# Patient Record
Sex: MALE | Race: WHITE | NOT HISPANIC OR LATINO | Employment: OTHER | URBAN - METROPOLITAN AREA
[De-identification: names, ages, dates, MRNs, and addresses within clinical notes are randomized per-mention and may not be internally consistent; named-entity substitution may affect disease eponyms.]

---

## 2019-04-03 ENCOUNTER — HOSPITAL ENCOUNTER (EMERGENCY)
Facility: HOSPITAL | Age: 49
Discharge: HOME | End: 2019-04-03
Attending: INTERNAL MEDICINE

## 2019-04-03 VITALS
BODY MASS INDEX: 48.18 KG/M2 | WEIGHT: 307 LBS | RESPIRATION RATE: 20 BRPM | OXYGEN SATURATION: 97 % | SYSTOLIC BLOOD PRESSURE: 134 MMHG | DIASTOLIC BLOOD PRESSURE: 99 MMHG | HEART RATE: 88 BPM | HEIGHT: 67 IN

## 2019-04-03 DIAGNOSIS — K57.92 ACUTE DIVERTICULITIS OF INTESTINE: Primary | ICD-10-CM

## 2019-04-03 LAB
ALBUMIN SERPL BCP-MCNC: 3.8 G/DL (ref 3.5–5.2)
ALP SERPL-CCNC: 85 U/L (ref 55–135)
ALT SERPL W/O P-5'-P-CCNC: 29 U/L (ref 10–44)
ANION GAP SERPL CALC-SCNC: 7 MMOL/L (ref 8–16)
AST SERPL-CCNC: 27 U/L (ref 10–40)
BASOPHILS # BLD AUTO: 0.05 K/UL (ref 0–0.2)
BASOPHILS NFR BLD: 0.5 % (ref 0–1.9)
BILIRUB SERPL-MCNC: 0.8 MG/DL (ref 0.1–1)
BUN SERPL-MCNC: 22 MG/DL (ref 6–20)
CALCIUM SERPL-MCNC: 9.3 MG/DL (ref 8.7–10.5)
CHLORIDE SERPL-SCNC: 99 MMOL/L (ref 95–110)
CO2 SERPL-SCNC: 27 MMOL/L (ref 23–29)
CREAT SERPL-MCNC: 1.4 MG/DL (ref 0.5–1.4)
CRP SERPL-MCNC: 4.29 MG/DL (ref 0–0.75)
DIFFERENTIAL METHOD: ABNORMAL
EOSINOPHIL # BLD AUTO: 0.1 K/UL (ref 0–0.5)
EOSINOPHIL NFR BLD: 0.9 % (ref 0–8)
ERYTHROCYTE [DISTWIDTH] IN BLOOD BY AUTOMATED COUNT: 11.9 % (ref 11.5–14.5)
EST. GFR  (AFRICAN AMERICAN): >60 ML/MIN/1.73 M^2
EST. GFR  (NON AFRICAN AMERICAN): 57.8 ML/MIN/1.73 M^2
GLUCOSE SERPL-MCNC: 115 MG/DL (ref 70–110)
HCT VFR BLD AUTO: 45.6 % (ref 40–54)
HGB BLD-MCNC: 14.9 G/DL (ref 14–18)
IMM GRANULOCYTES # BLD AUTO: 0.02 K/UL (ref 0–0.04)
IMM GRANULOCYTES NFR BLD AUTO: 0.2 % (ref 0–0.5)
LYMPHOCYTES # BLD AUTO: 2.5 K/UL (ref 1–4.8)
LYMPHOCYTES NFR BLD: 27.1 % (ref 18–48)
MCH RBC QN AUTO: 30.3 PG (ref 27–31)
MCHC RBC AUTO-ENTMCNC: 32.7 G/DL (ref 32–36)
MCV RBC AUTO: 93 FL (ref 82–98)
MONOCYTES # BLD AUTO: 1.1 K/UL (ref 0.3–1)
MONOCYTES NFR BLD: 12 % (ref 4–15)
NEUTROPHILS # BLD AUTO: 5.5 K/UL (ref 1.8–7.7)
NEUTROPHILS NFR BLD: 59.3 % (ref 38–73)
NRBC BLD-RTO: 0 /100 WBC
PLATELET # BLD AUTO: 234 K/UL (ref 150–350)
PMV BLD AUTO: 9.4 FL (ref 9.2–12.9)
POTASSIUM SERPL-SCNC: 4 MMOL/L (ref 3.5–5.1)
PROT SERPL-MCNC: 8.1 G/DL (ref 6–8.4)
RBC # BLD AUTO: 4.92 M/UL (ref 4.6–6.2)
SODIUM SERPL-SCNC: 133 MMOL/L (ref 136–145)
WBC # BLD AUTO: 9.23 K/UL (ref 3.9–12.7)

## 2019-04-03 PROCEDURE — 80053 COMPREHEN METABOLIC PANEL: CPT

## 2019-04-03 PROCEDURE — 74176 CT ABD & PELVIS W/O CONTRAST: CPT | Mod: 26,,, | Performed by: RADIOLOGY

## 2019-04-03 PROCEDURE — 74176 CT ABD & PELVIS W/O CONTRAST: CPT | Mod: TC

## 2019-04-03 PROCEDURE — 99284 EMERGENCY DEPT VISIT MOD MDM: CPT | Mod: 25

## 2019-04-03 PROCEDURE — 25000003 PHARM REV CODE 250: Performed by: INTERNAL MEDICINE

## 2019-04-03 PROCEDURE — 86140 C-REACTIVE PROTEIN: CPT

## 2019-04-03 PROCEDURE — 74176 CT RENAL STONE STUDY ABD PELVIS WO: ICD-10-PCS | Mod: 26,,, | Performed by: RADIOLOGY

## 2019-04-03 PROCEDURE — 63600175 PHARM REV CODE 636 W HCPCS: Performed by: INTERNAL MEDICINE

## 2019-04-03 PROCEDURE — 96375 TX/PRO/DX INJ NEW DRUG ADDON: CPT

## 2019-04-03 PROCEDURE — 96365 THER/PROPH/DIAG IV INF INIT: CPT

## 2019-04-03 PROCEDURE — S0077 INJECTION, CLINDAMYCIN PHOSP: HCPCS | Performed by: INTERNAL MEDICINE

## 2019-04-03 PROCEDURE — 85025 COMPLETE CBC W/AUTO DIFF WBC: CPT

## 2019-04-03 RX ORDER — ALLOPURINOL 100 MG/1
100 TABLET ORAL DAILY
COMMUNITY
End: 2021-06-10 | Stop reason: SDUPTHER

## 2019-04-03 RX ORDER — SULFAMETHOXAZOLE AND TRIMETHOPRIM 400; 80 MG/1; MG/1
1 TABLET ORAL
Status: DISCONTINUED | OUTPATIENT
Start: 2019-04-03 | End: 2019-04-03

## 2019-04-03 RX ORDER — CLINDAMYCIN HYDROCHLORIDE 150 MG/1
300 CAPSULE ORAL 4 TIMES DAILY
Qty: 56 CAPSULE | Refills: 0 | Status: SHIPPED | OUTPATIENT
Start: 2019-04-03 | End: 2019-04-10

## 2019-04-03 RX ORDER — SULFAMETHOXAZOLE AND TRIMETHOPRIM 800; 160 MG/1; MG/1
1 TABLET ORAL
Status: COMPLETED | OUTPATIENT
Start: 2019-04-03 | End: 2019-04-03

## 2019-04-03 RX ORDER — PANTOPRAZOLE SODIUM 20 MG/1
20 TABLET, DELAYED RELEASE ORAL DAILY
Status: ON HOLD | COMMUNITY
End: 2021-05-27 | Stop reason: HOSPADM

## 2019-04-03 RX ORDER — CLINDAMYCIN PHOSPHATE 900 MG/50ML
900 INJECTION, SOLUTION INTRAVENOUS
Status: COMPLETED | OUTPATIENT
Start: 2019-04-03 | End: 2019-04-03

## 2019-04-03 RX ORDER — DEXAMETHASONE SODIUM PHOSPHATE 100 MG/10ML
10 INJECTION INTRAMUSCULAR; INTRAVENOUS
Status: COMPLETED | OUTPATIENT
Start: 2019-04-03 | End: 2019-04-03

## 2019-04-03 RX ORDER — KETOROLAC TROMETHAMINE 30 MG/ML
30 INJECTION, SOLUTION INTRAMUSCULAR; INTRAVENOUS
Status: COMPLETED | OUTPATIENT
Start: 2019-04-03 | End: 2019-04-03

## 2019-04-03 RX ORDER — DEXAMETHASONE SODIUM PHOSPHATE 100 MG/10ML
4 INJECTION INTRAMUSCULAR; INTRAVENOUS
Status: DISCONTINUED | OUTPATIENT
Start: 2019-04-03 | End: 2019-04-03

## 2019-04-03 RX ORDER — CLONIDINE HYDROCHLORIDE 0.2 MG/1
0.2 TABLET ORAL ONCE
COMMUNITY
End: 2021-06-10 | Stop reason: SDUPTHER

## 2019-04-03 RX ORDER — SULFAMETHOXAZOLE AND TRIMETHOPRIM 800; 160 MG/1; MG/1
1 TABLET ORAL 2 TIMES DAILY
Qty: 14 TABLET | Refills: 0 | Status: SHIPPED | OUTPATIENT
Start: 2019-04-03 | End: 2019-04-10

## 2019-04-03 RX ADMIN — KETOROLAC TROMETHAMINE 30 MG: 30 INJECTION, SOLUTION INTRAMUSCULAR; INTRAVENOUS at 09:04

## 2019-04-03 RX ADMIN — CLINDAMYCIN IN 5 PERCENT DEXTROSE 900 MG: 18 INJECTION, SOLUTION INTRAVENOUS at 08:04

## 2019-04-03 RX ADMIN — DEXAMETHASONE SODIUM PHOSPHATE 10 MG: 10 INJECTION INTRAMUSCULAR; INTRAVENOUS at 09:04

## 2019-04-03 RX ADMIN — SULFAMETHOXAZOLE AND TRIMETHOPRIM 1 TABLET: 800; 160 TABLET ORAL at 08:04

## 2019-04-04 NOTE — ED PROVIDER NOTES
Encounter Date: 4/3/2019       History     Chief Complaint   Patient presents with    Abdominal Pain     symptoms since Saturday, no B/M     Patient presents with acute left lower quadrant pain of about 3 days duration.  He has had some mild nausea for about 2 weeks but not with focal pain. The pain is 8/10 and is referred.  He notes that when he goes over a bump in the car at causes significant pain. He has had no fever no chills he has been constipated has not had a bowel movement in 5 days.  He has tried over-the-counter laxatives with no success.  He has no history of constipation in the recent past is not taking any pain medication or other contributory causes for constipation.  He has had no history of diverticulitis.  No diarrhea.        Review of patient's allergies indicates:   Allergen Reactions    Codeine     Pcn [penicillins]      Past Medical History:   Diagnosis Date    Gout     Hypertension      History reviewed. No pertinent surgical history.  History reviewed. No pertinent family history.  Social History     Tobacco Use    Smoking status: Never Smoker   Substance Use Topics    Alcohol use: Not Currently    Drug use: Not on file     Review of Systems   Constitutional: Negative for fever.   HENT: Negative for sore throat.    Respiratory: Negative for shortness of breath.    Cardiovascular: Negative for chest pain.   Gastrointestinal: Positive for abdominal distention, abdominal pain and nausea.   Genitourinary: Negative for dysuria.   Musculoskeletal: Negative for back pain.   Skin: Negative for rash.   Neurological: Negative for weakness.   Hematological: Does not bruise/bleed easily.   All other systems reviewed and are negative.      Physical Exam     Initial Vitals [04/03/19 1912]   BP Pulse Resp Temp SpO2   (!) 156/109 88 18 -- 98 %      MAP       --         Physical Exam    Constitutional: Vital signs are normal. He appears well-developed and well-nourished. He is active and cooperative.    HENT:   Head: Normocephalic and atraumatic.   Mouth/Throat: Oropharynx is clear and moist.   Eyes: Conjunctivae, EOM and lids are normal. Lids are everted and swept, no foreign bodies found.   Neck: Trachea normal, normal range of motion and full passive range of motion without pain. Neck supple.   Cardiovascular: Normal rate, regular rhythm, S1 normal, S2 normal, normal heart sounds, intact distal pulses and normal pulses.  No extrasystoles are present.    Abdominal: Soft. Normal appearance. Bowel sounds are decreased. There is tenderness in the left lower quadrant. There is rebound and guarding.   Musculoskeletal: Normal range of motion.   Neurological: He is alert. He has normal reflexes. GCS eye subscore is 4. GCS verbal subscore is 5. GCS motor subscore is 6.   Skin: Skin is warm, dry and intact. Capillary refill takes less than 2 seconds.   Psychiatric: He has a normal mood and affect. His speech is normal and behavior is normal. Cognition and memory are normal.         ED Course   Procedures  Labs Reviewed   CBC W/ AUTO DIFFERENTIAL - Abnormal; Notable for the following components:       Result Value    Mono # 1.1 (*)     All other components within normal limits   COMPREHENSIVE METABOLIC PANEL - Abnormal; Notable for the following components:    Sodium 133 (*)     Glucose 115 (*)     BUN, Bld 22 (*)     Anion Gap 7 (*)     eGFR if non  57.8 (*)     All other components within normal limits   C-REACTIVE PROTEIN - Abnormal; Notable for the following components:    CRP 4.29 (*)     All other components within normal limits          Imaging Results          CT Renal Stone Study ABD Pelvis WO (In process)               X-Rays:   Independently Interpreted Readings:   Abdomen: No masses. Liver: Normal. Gallbladder: Normal. Stomach: Normal. Pancreas: Normal.Large Bowel: Diverticulosis present.  Inflammatory changes present. Small Bowel: Normal. Vessels: Normal. Bladder: Normal.     Medical Decision  Making:   Clinical Tests:   Lab Tests: Ordered and Reviewed  The following lab test(s) were unremarkable: CBC, CMP and Lipase       <> Summary of Lab: Lab studies are unremarkable.  He does have an elevated CRP of 4.6 hr CBC is normal and his amylase lipase and liver function studies are normal.  Radiological Study: Ordered and Reviewed  ED Management:  CT scan reveals evidence of left lower quadrant inflammation and possible diverticulitis.  No abscess is noted. No free air is noted. Patient was given intravenous antibiotics and will be continued on oral antibiotics.  He is to take MiraLax every 2 hr until he as evacuation.  It is felt that the significant constipation is contributing to the pressure in the area of inflammation.  He was noted to return if he has any such high fever sudden severe worsening of his pain or any other significant catastrophic changes.  His follow-up with local doctor in 2 days.                      Clinical Impression:       ICD-10-CM ICD-9-CM   1. Acute diverticulitis of intestine K57.92 562.11         Disposition:   Disposition: Discharged  Condition: Stable                        Erickson Duran MD  04/04/19 0220

## 2019-04-04 NOTE — ED NOTES
Pt here with c/o lower abd pain and constipation x4 days. Pt reports he typically has BM daily. Denies N/V. States he is able to eat some. No acute distress noted. Pt AAO.

## 2020-08-24 ENCOUNTER — HOSPITAL ENCOUNTER (EMERGENCY)
Facility: HOSPITAL | Age: 50
Discharge: HOME | End: 2020-08-24
Attending: EMERGENCY MEDICINE
Payer: MEDICAID

## 2020-08-24 VITALS
HEIGHT: 71 IN | RESPIRATION RATE: 15 BRPM | OXYGEN SATURATION: 97 % | TEMPERATURE: 99 F | HEART RATE: 77 BPM | SYSTOLIC BLOOD PRESSURE: 140 MMHG | DIASTOLIC BLOOD PRESSURE: 90 MMHG | BODY MASS INDEX: 43.4 KG/M2 | WEIGHT: 310 LBS

## 2020-08-24 DIAGNOSIS — R07.9 CHEST PAIN: Primary | ICD-10-CM

## 2020-08-24 DIAGNOSIS — K21.9 GASTROESOPHAGEAL REFLUX DISEASE, ESOPHAGITIS PRESENCE NOT SPECIFIED: ICD-10-CM

## 2020-08-24 LAB
ALBUMIN SERPL BCP-MCNC: 3.8 G/DL (ref 3.5–5.2)
ALP SERPL-CCNC: 96 U/L (ref 55–135)
ALT SERPL W/O P-5'-P-CCNC: 39 U/L (ref 10–44)
ANION GAP SERPL CALC-SCNC: 8 MMOL/L (ref 8–16)
AST SERPL-CCNC: 30 U/L (ref 10–40)
BASOPHILS # BLD AUTO: 0.09 K/UL (ref 0–0.2)
BASOPHILS NFR BLD: 1.3 % (ref 0–1.9)
BILIRUB SERPL-MCNC: 0.5 MG/DL (ref 0.1–1)
BNP SERPL-MCNC: 52 PG/ML (ref 0–99)
BUN SERPL-MCNC: 16 MG/DL (ref 6–20)
CALCIUM SERPL-MCNC: 9.2 MG/DL (ref 8.7–10.5)
CHLORIDE SERPL-SCNC: 104 MMOL/L (ref 95–110)
CO2 SERPL-SCNC: 24 MMOL/L (ref 23–29)
CREAT SERPL-MCNC: 0.7 MG/DL (ref 0.5–1.4)
DIFFERENTIAL METHOD: NORMAL
EOSINOPHIL # BLD AUTO: 0.1 K/UL (ref 0–0.5)
EOSINOPHIL NFR BLD: 1.4 % (ref 0–8)
ERYTHROCYTE [DISTWIDTH] IN BLOOD BY AUTOMATED COUNT: 11.9 % (ref 11.5–14.5)
EST. GFR  (AFRICAN AMERICAN): >60 ML/MIN/1.73 M^2
EST. GFR  (NON AFRICAN AMERICAN): >60 ML/MIN/1.73 M^2
GLUCOSE SERPL-MCNC: 146 MG/DL (ref 70–110)
HCT VFR BLD AUTO: 43 % (ref 40–54)
HGB BLD-MCNC: 14.5 G/DL (ref 14–18)
IMM GRANULOCYTES # BLD AUTO: 0.02 K/UL (ref 0–0.04)
IMM GRANULOCYTES NFR BLD AUTO: 0.3 % (ref 0–0.5)
LIPASE SERPL-CCNC: 28 U/L (ref 4–60)
LYMPHOCYTES # BLD AUTO: 1.7 K/UL (ref 1–4.8)
LYMPHOCYTES NFR BLD: 25.1 % (ref 18–48)
MCH RBC QN AUTO: 31 PG (ref 27–31)
MCHC RBC AUTO-ENTMCNC: 33.7 G/DL (ref 32–36)
MCV RBC AUTO: 92 FL (ref 82–98)
MONOCYTES # BLD AUTO: 0.6 K/UL (ref 0.3–1)
MONOCYTES NFR BLD: 8.7 % (ref 4–15)
NEUTROPHILS # BLD AUTO: 4.4 K/UL (ref 1.8–7.7)
NEUTROPHILS NFR BLD: 63.2 % (ref 38–73)
NRBC BLD-RTO: 0 /100 WBC
PLATELET # BLD AUTO: 178 K/UL (ref 150–350)
PMV BLD AUTO: 9.6 FL (ref 9.2–12.9)
POTASSIUM SERPL-SCNC: 4.1 MMOL/L (ref 3.5–5.1)
PROT SERPL-MCNC: 7.4 G/DL (ref 6–8.4)
RBC # BLD AUTO: 4.68 M/UL (ref 4.6–6.2)
SODIUM SERPL-SCNC: 136 MMOL/L (ref 136–145)
TROPONIN I SERPL DL<=0.01 NG/ML-MCNC: 0.01 NG/ML (ref 0.02–0.5)
TROPONIN I SERPL DL<=0.01 NG/ML-MCNC: <0.01 NG/ML (ref 0.02–0.5)
WBC # BLD AUTO: 6.9 K/UL (ref 3.9–12.7)

## 2020-08-24 PROCEDURE — 25000242 PHARM REV CODE 250 ALT 637 W/ HCPCS: Performed by: EMERGENCY MEDICINE

## 2020-08-24 PROCEDURE — 99285 EMERGENCY DEPT VISIT HI MDM: CPT | Mod: 25

## 2020-08-24 PROCEDURE — 25000003 PHARM REV CODE 250: Performed by: EMERGENCY MEDICINE

## 2020-08-24 PROCEDURE — 93005 ELECTROCARDIOGRAM TRACING: CPT

## 2020-08-24 PROCEDURE — 71045 X-RAY EXAM CHEST 1 VIEW: CPT | Mod: TC,FY

## 2020-08-24 PROCEDURE — 84484 ASSAY OF TROPONIN QUANT: CPT | Mod: 91

## 2020-08-24 PROCEDURE — 80053 COMPREHEN METABOLIC PANEL: CPT

## 2020-08-24 PROCEDURE — 83690 ASSAY OF LIPASE: CPT

## 2020-08-24 PROCEDURE — 71045 X-RAY EXAM CHEST 1 VIEW: CPT | Mod: 26,,, | Performed by: RADIOLOGY

## 2020-08-24 PROCEDURE — 36415 COLL VENOUS BLD VENIPUNCTURE: CPT

## 2020-08-24 PROCEDURE — 85025 COMPLETE CBC W/AUTO DIFF WBC: CPT

## 2020-08-24 PROCEDURE — 83880 ASSAY OF NATRIURETIC PEPTIDE: CPT

## 2020-08-24 PROCEDURE — 71045 XR CHEST AP PORTABLE: ICD-10-PCS | Mod: 26,,, | Performed by: RADIOLOGY

## 2020-08-24 RX ORDER — HYDROCODONE BITARTRATE AND ACETAMINOPHEN 5; 325 MG/1; MG/1
1 TABLET ORAL EVERY 4 HOURS PRN
Qty: 1 TABLET | Refills: 0 | Status: SHIPPED | OUTPATIENT
Start: 2020-08-24 | End: 2020-08-24 | Stop reason: CLARIF

## 2020-08-24 RX ORDER — NITROGLYCERIN 0.4 MG/1
0.4 TABLET SUBLINGUAL
Status: COMPLETED | OUTPATIENT
Start: 2020-08-24 | End: 2020-08-24

## 2020-08-24 RX ADMIN — LIDOCAINE HYDROCHLORIDE: 20 SOLUTION ORAL; TOPICAL at 02:08

## 2020-08-24 RX ADMIN — NITROGLYCERIN 0.4 MG: 0.4 TABLET SUBLINGUAL at 02:08

## 2021-05-26 ENCOUNTER — HOSPITAL ENCOUNTER (OUTPATIENT)
Facility: HOSPITAL | Age: 51
Discharge: HOME | End: 2021-05-27
Attending: EMERGENCY MEDICINE | Admitting: FAMILY MEDICINE
Payer: MEDICAID

## 2021-05-26 DIAGNOSIS — K21.9 GASTROESOPHAGEAL REFLUX DISEASE, UNSPECIFIED WHETHER ESOPHAGITIS PRESENT: ICD-10-CM

## 2021-05-26 DIAGNOSIS — E66.01 MORBID OBESITY: ICD-10-CM

## 2021-05-26 DIAGNOSIS — E11.9 TYPE 2 DIABETES MELLITUS WITHOUT COMPLICATION, UNSPECIFIED WHETHER LONG TERM INSULIN USE: ICD-10-CM

## 2021-05-26 DIAGNOSIS — I10 UNCONTROLLED HYPERTENSION: ICD-10-CM

## 2021-05-26 DIAGNOSIS — R07.2 PRECORDIAL CHEST PAIN: ICD-10-CM

## 2021-05-26 DIAGNOSIS — R14.2 BELCHING: ICD-10-CM

## 2021-05-26 DIAGNOSIS — R07.9 CHEST PAIN: Primary | ICD-10-CM

## 2021-05-26 PROBLEM — R19.8 SYMPTOMS OF GASTROESOPHAGEAL REFLUX: Status: ACTIVE | Noted: 2021-05-26

## 2021-05-26 LAB
ALBUMIN SERPL BCP-MCNC: 3.9 G/DL (ref 3.5–5.2)
ALP SERPL-CCNC: 96 U/L (ref 55–135)
ALT SERPL W/O P-5'-P-CCNC: 33 U/L (ref 10–44)
ANION GAP SERPL CALC-SCNC: 11 MMOL/L (ref 8–16)
AST SERPL-CCNC: 23 U/L (ref 10–40)
BASOPHILS # BLD AUTO: 0.08 K/UL (ref 0–0.2)
BASOPHILS NFR BLD: 1.1 % (ref 0–1.9)
BILIRUB SERPL-MCNC: 0.5 MG/DL (ref 0.1–1)
BNP SERPL-MCNC: 11 PG/ML (ref 0–99)
BUN SERPL-MCNC: 16 MG/DL (ref 6–20)
CALCIUM SERPL-MCNC: 9.7 MG/DL (ref 8.7–10.5)
CHLORIDE SERPL-SCNC: 105 MMOL/L (ref 95–110)
CHOLEST SERPL-MCNC: 173 MG/DL (ref 120–199)
CHOLEST/HDLC SERPL: 3.1 {RATIO} (ref 2–5)
CO2 SERPL-SCNC: 25 MMOL/L (ref 23–29)
CREAT SERPL-MCNC: 0.8 MG/DL (ref 0.5–1.4)
DIFFERENTIAL METHOD: ABNORMAL
EOSINOPHIL # BLD AUTO: 0.1 K/UL (ref 0–0.5)
EOSINOPHIL NFR BLD: 1.5 % (ref 0–8)
ERYTHROCYTE [DISTWIDTH] IN BLOOD BY AUTOMATED COUNT: 12.1 % (ref 11.5–14.5)
EST. GFR  (AFRICAN AMERICAN): >60 ML/MIN/1.73 M^2
EST. GFR  (NON AFRICAN AMERICAN): >60 ML/MIN/1.73 M^2
GLUCOSE SERPL-MCNC: 103 MG/DL (ref 70–110)
HCT VFR BLD AUTO: 43.1 % (ref 40–54)
HDLC SERPL-MCNC: 55 MG/DL (ref 40–75)
HDLC SERPL: 31.8 % (ref 20–50)
HGB BLD-MCNC: 14.7 G/DL (ref 14–18)
IMM GRANULOCYTES # BLD AUTO: 0.02 K/UL (ref 0–0.04)
IMM GRANULOCYTES NFR BLD AUTO: 0.3 % (ref 0–0.5)
LDLC SERPL CALC-MCNC: 97 MG/DL (ref 63–159)
LYMPHOCYTES # BLD AUTO: 2.5 K/UL (ref 1–4.8)
LYMPHOCYTES NFR BLD: 34.9 % (ref 18–48)
MCH RBC QN AUTO: 31.2 PG (ref 27–31)
MCHC RBC AUTO-ENTMCNC: 34.1 G/DL (ref 32–36)
MCV RBC AUTO: 92 FL (ref 82–98)
MONOCYTES # BLD AUTO: 0.6 K/UL (ref 0.3–1)
MONOCYTES NFR BLD: 8.8 % (ref 4–15)
NEUTROPHILS # BLD AUTO: 3.9 K/UL (ref 1.8–7.7)
NEUTROPHILS NFR BLD: 53.4 % (ref 38–73)
NONHDLC SERPL-MCNC: 118 MG/DL
NRBC BLD-RTO: 0 /100 WBC
PLATELET # BLD AUTO: 220 K/UL (ref 150–450)
PMV BLD AUTO: 9.4 FL (ref 9.2–12.9)
POCT GLUCOSE: 119 MG/DL (ref 70–110)
POTASSIUM SERPL-SCNC: 4.1 MMOL/L (ref 3.5–5.1)
PROT SERPL-MCNC: 7.7 G/DL (ref 6–8.4)
RBC # BLD AUTO: 4.71 M/UL (ref 4.6–6.2)
SARS-COV-2 RDRP RESP QL NAA+PROBE: NEGATIVE
SODIUM SERPL-SCNC: 141 MMOL/L (ref 136–145)
TRIGL SERPL-MCNC: 105 MG/DL (ref 30–150)
TROPONIN I SERPL DL<=0.01 NG/ML-MCNC: 0.01 NG/ML (ref 0–0.03)
TROPONIN I SERPL DL<=0.01 NG/ML-MCNC: <0.006 NG/ML (ref 0–0.03)
TROPONIN I SERPL DL<=0.01 NG/ML-MCNC: <0.006 NG/ML (ref 0–0.03)
WBC # BLD AUTO: 7.28 K/UL (ref 3.9–12.7)

## 2021-05-26 PROCEDURE — 96372 THER/PROPH/DIAG INJ SC/IM: CPT

## 2021-05-26 PROCEDURE — 71045 X-RAY EXAM CHEST 1 VIEW: CPT | Mod: TC,FY

## 2021-05-26 PROCEDURE — 71045 X-RAY EXAM CHEST 1 VIEW: CPT | Mod: 26,,, | Performed by: RADIOLOGY

## 2021-05-26 PROCEDURE — 99219 PR INITIAL OBSERVATION CARE,LEVL II: CPT | Mod: ,,, | Performed by: FAMILY MEDICINE

## 2021-05-26 PROCEDURE — 25000003 PHARM REV CODE 250: Performed by: FAMILY MEDICINE

## 2021-05-26 PROCEDURE — 84484 ASSAY OF TROPONIN QUANT: CPT | Mod: 91 | Performed by: EMERGENCY MEDICINE

## 2021-05-26 PROCEDURE — 25000003 PHARM REV CODE 250: Performed by: EMERGENCY MEDICINE

## 2021-05-26 PROCEDURE — 36415 COLL VENOUS BLD VENIPUNCTURE: CPT | Performed by: FAMILY MEDICINE

## 2021-05-26 PROCEDURE — 63600175 PHARM REV CODE 636 W HCPCS: Performed by: FAMILY MEDICINE

## 2021-05-26 PROCEDURE — 85025 COMPLETE CBC W/AUTO DIFF WBC: CPT | Performed by: EMERGENCY MEDICINE

## 2021-05-26 PROCEDURE — 80053 COMPREHEN METABOLIC PANEL: CPT | Performed by: EMERGENCY MEDICINE

## 2021-05-26 PROCEDURE — G0378 HOSPITAL OBSERVATION PER HR: HCPCS

## 2021-05-26 PROCEDURE — 99219 PR INITIAL OBSERVATION CARE,LEVL II: ICD-10-PCS | Mod: ,,, | Performed by: FAMILY MEDICINE

## 2021-05-26 PROCEDURE — 82962 GLUCOSE BLOOD TEST: CPT

## 2021-05-26 PROCEDURE — 80061 LIPID PANEL: CPT | Performed by: FAMILY MEDICINE

## 2021-05-26 PROCEDURE — 71045 XR CHEST AP PORTABLE: ICD-10-PCS | Mod: 26,,, | Performed by: RADIOLOGY

## 2021-05-26 PROCEDURE — 93005 ELECTROCARDIOGRAM TRACING: CPT

## 2021-05-26 PROCEDURE — U0002 COVID-19 LAB TEST NON-CDC: HCPCS | Performed by: EMERGENCY MEDICINE

## 2021-05-26 PROCEDURE — 84484 ASSAY OF TROPONIN QUANT: CPT | Mod: 91 | Performed by: FAMILY MEDICINE

## 2021-05-26 PROCEDURE — 83880 ASSAY OF NATRIURETIC PEPTIDE: CPT | Performed by: EMERGENCY MEDICINE

## 2021-05-26 PROCEDURE — 99285 EMERGENCY DEPT VISIT HI MDM: CPT | Mod: 25

## 2021-05-26 RX ORDER — ENOXAPARIN SODIUM 100 MG/ML
40 INJECTION SUBCUTANEOUS EVERY 24 HOURS
Status: DISCONTINUED | OUTPATIENT
Start: 2021-05-26 | End: 2021-05-26

## 2021-05-26 RX ORDER — PANTOPRAZOLE SODIUM 20 MG/1
20 TABLET, DELAYED RELEASE ORAL DAILY
Status: DISCONTINUED | OUTPATIENT
Start: 2021-05-27 | End: 2021-05-26

## 2021-05-26 RX ORDER — ONDANSETRON 2 MG/ML
4 INJECTION INTRAMUSCULAR; INTRAVENOUS EVERY 8 HOURS PRN
Status: DISCONTINUED | OUTPATIENT
Start: 2021-05-26 | End: 2021-05-27 | Stop reason: HOSPADM

## 2021-05-26 RX ORDER — ALLOPURINOL 100 MG/1
100 TABLET ORAL DAILY
Status: DISCONTINUED | OUTPATIENT
Start: 2021-05-27 | End: 2021-05-27 | Stop reason: HOSPADM

## 2021-05-26 RX ORDER — ENOXAPARIN SODIUM 100 MG/ML
40 INJECTION SUBCUTANEOUS EVERY 12 HOURS
Status: DISCONTINUED | OUTPATIENT
Start: 2021-05-26 | End: 2021-05-27 | Stop reason: HOSPADM

## 2021-05-26 RX ORDER — CLONIDINE HYDROCHLORIDE 0.1 MG/1
0.2 TABLET ORAL NIGHTLY
Status: DISCONTINUED | OUTPATIENT
Start: 2021-05-26 | End: 2021-05-27 | Stop reason: HOSPADM

## 2021-05-26 RX ORDER — PANTOPRAZOLE SODIUM 40 MG/1
40 TABLET, DELAYED RELEASE ORAL DAILY
Status: DISCONTINUED | OUTPATIENT
Start: 2021-05-26 | End: 2021-05-27 | Stop reason: HOSPADM

## 2021-05-26 RX ORDER — PROMETHAZINE HYDROCHLORIDE 12.5 MG/1
25 TABLET ORAL EVERY 6 HOURS PRN
Status: DISCONTINUED | OUTPATIENT
Start: 2021-05-26 | End: 2021-05-27 | Stop reason: HOSPADM

## 2021-05-26 RX ORDER — ASPIRIN 325 MG
325 TABLET ORAL
Status: COMPLETED | OUTPATIENT
Start: 2021-05-26 | End: 2021-05-26

## 2021-05-26 RX ORDER — SODIUM CHLORIDE 0.9 % (FLUSH) 0.9 %
10 SYRINGE (ML) INJECTION
Status: DISCONTINUED | OUTPATIENT
Start: 2021-05-26 | End: 2021-05-27 | Stop reason: HOSPADM

## 2021-05-26 RX ORDER — METFORMIN HYDROCHLORIDE 500 MG/1
500 TABLET ORAL 2 TIMES DAILY WITH MEALS
Status: ON HOLD | COMMUNITY
End: 2021-05-27 | Stop reason: SDUPTHER

## 2021-05-26 RX ORDER — ACETAMINOPHEN 325 MG/1
650 TABLET ORAL EVERY 4 HOURS PRN
Status: DISCONTINUED | OUTPATIENT
Start: 2021-05-26 | End: 2021-05-27 | Stop reason: HOSPADM

## 2021-05-26 RX ORDER — NITROGLYCERIN 0.4 MG/1
0.4 TABLET SUBLINGUAL EVERY 5 MIN PRN
Status: DISCONTINUED | OUTPATIENT
Start: 2021-05-26 | End: 2021-05-27 | Stop reason: HOSPADM

## 2021-05-26 RX ORDER — METOPROLOL SUCCINATE 25 MG/1
25 TABLET, EXTENDED RELEASE ORAL 2 TIMES DAILY
Status: ON HOLD | COMMUNITY
End: 2021-05-27 | Stop reason: SDUPTHER

## 2021-05-26 RX ORDER — METOPROLOL SUCCINATE 25 MG/1
25 TABLET, EXTENDED RELEASE ORAL 2 TIMES DAILY
Status: DISCONTINUED | OUTPATIENT
Start: 2021-05-26 | End: 2021-05-27 | Stop reason: HOSPADM

## 2021-05-26 RX ORDER — DICYCLOMINE HYDROCHLORIDE 10 MG/1
10 CAPSULE ORAL 4 TIMES DAILY PRN
Status: DISCONTINUED | OUTPATIENT
Start: 2021-05-26 | End: 2021-05-27 | Stop reason: HOSPADM

## 2021-05-26 RX ORDER — GLYBURIDE 5 MG/1
5 TABLET ORAL
Status: ON HOLD | COMMUNITY
End: 2021-05-27 | Stop reason: HOSPADM

## 2021-05-26 RX ADMIN — METOPROLOL SUCCINATE 25 MG: 25 TABLET, EXTENDED RELEASE ORAL at 09:05

## 2021-05-26 RX ADMIN — PANTOPRAZOLE SODIUM 40 MG: 40 TABLET, DELAYED RELEASE ORAL at 09:05

## 2021-05-26 RX ADMIN — DICYCLOMINE HYDROCHLORIDE 10 MG: 10 CAPSULE ORAL at 09:05

## 2021-05-26 RX ADMIN — ASPIRIN 325 MG ORAL TABLET 325 MG: 325 PILL ORAL at 10:05

## 2021-05-26 RX ADMIN — CLONIDINE HYDROCHLORIDE 0.2 MG: 0.1 TABLET ORAL at 09:05

## 2021-05-26 RX ADMIN — ENOXAPARIN SODIUM 40 MG: 40 INJECTION SUBCUTANEOUS at 09:05

## 2021-05-27 VITALS
HEIGHT: 71 IN | DIASTOLIC BLOOD PRESSURE: 76 MMHG | SYSTOLIC BLOOD PRESSURE: 141 MMHG | WEIGHT: 315 LBS | OXYGEN SATURATION: 98 % | HEART RATE: 64 BPM | BODY MASS INDEX: 44.1 KG/M2 | TEMPERATURE: 97 F | RESPIRATION RATE: 18 BRPM

## 2021-05-27 LAB
ALBUMIN SERPL BCP-MCNC: 3.4 G/DL (ref 3.5–5.2)
ALP SERPL-CCNC: 82 U/L (ref 55–135)
ALT SERPL W/O P-5'-P-CCNC: 28 U/L (ref 10–44)
ANION GAP SERPL CALC-SCNC: 9 MMOL/L (ref 8–16)
AST SERPL-CCNC: 21 U/L (ref 10–40)
BASOPHILS # BLD AUTO: 0.06 K/UL (ref 0–0.2)
BASOPHILS NFR BLD: 0.9 % (ref 0–1.9)
BILIRUB SERPL-MCNC: 0.5 MG/DL (ref 0.1–1)
BUN SERPL-MCNC: 12 MG/DL (ref 6–20)
CALCIUM SERPL-MCNC: 9.2 MG/DL (ref 8.7–10.5)
CHLORIDE SERPL-SCNC: 104 MMOL/L (ref 95–110)
CO2 SERPL-SCNC: 26 MMOL/L (ref 23–29)
CREAT SERPL-MCNC: 0.8 MG/DL (ref 0.5–1.4)
DIFFERENTIAL METHOD: ABNORMAL
EOSINOPHIL # BLD AUTO: 0.2 K/UL (ref 0–0.5)
EOSINOPHIL NFR BLD: 2.3 % (ref 0–8)
ERYTHROCYTE [DISTWIDTH] IN BLOOD BY AUTOMATED COUNT: 11.9 % (ref 11.5–14.5)
EST. GFR  (AFRICAN AMERICAN): >60 ML/MIN/1.73 M^2
EST. GFR  (NON AFRICAN AMERICAN): >60 ML/MIN/1.73 M^2
ESTIMATED AVG GLUCOSE: 151 MG/DL (ref 68–131)
GLUCOSE SERPL-MCNC: 128 MG/DL (ref 70–110)
HBA1C MFR BLD: 6.9 % (ref 4–5.6)
HCT VFR BLD AUTO: 41.9 % (ref 40–54)
HGB BLD-MCNC: 13.8 G/DL (ref 14–18)
IMM GRANULOCYTES # BLD AUTO: 0.01 K/UL (ref 0–0.04)
IMM GRANULOCYTES NFR BLD AUTO: 0.2 % (ref 0–0.5)
LYMPHOCYTES # BLD AUTO: 2.1 K/UL (ref 1–4.8)
LYMPHOCYTES NFR BLD: 32.2 % (ref 18–48)
MCH RBC QN AUTO: 30.7 PG (ref 27–31)
MCHC RBC AUTO-ENTMCNC: 32.9 G/DL (ref 32–36)
MCV RBC AUTO: 93 FL (ref 82–98)
MONOCYTES # BLD AUTO: 0.6 K/UL (ref 0.3–1)
MONOCYTES NFR BLD: 9.6 % (ref 4–15)
NEUTROPHILS # BLD AUTO: 3.6 K/UL (ref 1.8–7.7)
NEUTROPHILS NFR BLD: 54.8 % (ref 38–73)
NRBC BLD-RTO: 0 /100 WBC
PLATELET # BLD AUTO: 198 K/UL (ref 150–450)
PMV BLD AUTO: 9.5 FL (ref 9.2–12.9)
POTASSIUM SERPL-SCNC: 4.2 MMOL/L (ref 3.5–5.1)
PROT SERPL-MCNC: 6.9 G/DL (ref 6–8.4)
RBC # BLD AUTO: 4.49 M/UL (ref 4.6–6.2)
SODIUM SERPL-SCNC: 139 MMOL/L (ref 136–145)
WBC # BLD AUTO: 6.64 K/UL (ref 3.9–12.7)

## 2021-05-27 PROCEDURE — 63600175 PHARM REV CODE 636 W HCPCS: Performed by: FAMILY MEDICINE

## 2021-05-27 PROCEDURE — 25000003 PHARM REV CODE 250: Performed by: FAMILY MEDICINE

## 2021-05-27 PROCEDURE — 99225 PR SUBSEQUENT OBSERVATION CARE,LEVEL II: ICD-10-PCS | Mod: ,,, | Performed by: FAMILY MEDICINE

## 2021-05-27 PROCEDURE — G0378 HOSPITAL OBSERVATION PER HR: HCPCS

## 2021-05-27 PROCEDURE — 36415 COLL VENOUS BLD VENIPUNCTURE: CPT | Performed by: FAMILY MEDICINE

## 2021-05-27 PROCEDURE — 85025 COMPLETE CBC W/AUTO DIFF WBC: CPT | Performed by: FAMILY MEDICINE

## 2021-05-27 PROCEDURE — 80053 COMPREHEN METABOLIC PANEL: CPT | Performed by: FAMILY MEDICINE

## 2021-05-27 PROCEDURE — 96372 THER/PROPH/DIAG INJ SC/IM: CPT

## 2021-05-27 PROCEDURE — 83036 HEMOGLOBIN GLYCOSYLATED A1C: CPT | Performed by: FAMILY MEDICINE

## 2021-05-27 PROCEDURE — 99225 PR SUBSEQUENT OBSERVATION CARE,LEVEL II: CPT | Mod: ,,, | Performed by: FAMILY MEDICINE

## 2021-05-27 RX ORDER — PANTOPRAZOLE SODIUM 40 MG/1
40 TABLET, DELAYED RELEASE ORAL DAILY
Qty: 30 TABLET | Refills: 11 | Status: SHIPPED | OUTPATIENT
Start: 2021-05-28 | End: 2021-08-10 | Stop reason: SDUPTHER

## 2021-05-27 RX ORDER — DICYCLOMINE HYDROCHLORIDE 10 MG/1
10 CAPSULE ORAL 4 TIMES DAILY PRN
Qty: 120 CAPSULE | Refills: 1 | Status: SHIPPED | OUTPATIENT
Start: 2021-05-27 | End: 2021-06-26

## 2021-05-27 RX ORDER — METOPROLOL SUCCINATE 50 MG/1
50 TABLET, EXTENDED RELEASE ORAL DAILY
Qty: 30 TABLET | Refills: 11 | Status: SHIPPED | OUTPATIENT
Start: 2021-05-27 | End: 2021-05-28 | Stop reason: ALTCHOICE

## 2021-05-27 RX ORDER — NITROGLYCERIN 0.4 MG/1
0.4 TABLET SUBLINGUAL EVERY 5 MIN PRN
Qty: 30 TABLET | Refills: 0 | Status: SHIPPED | OUTPATIENT
Start: 2021-05-27 | End: 2022-05-27

## 2021-05-27 RX ORDER — METFORMIN HYDROCHLORIDE 500 MG/1
500 TABLET ORAL 2 TIMES DAILY WITH MEALS
Qty: 60 TABLET | Refills: 11 | Status: SHIPPED | OUTPATIENT
Start: 2021-05-27 | End: 2021-06-29

## 2021-05-27 RX ADMIN — ENOXAPARIN SODIUM 40 MG: 40 INJECTION SUBCUTANEOUS at 08:05

## 2021-05-27 RX ADMIN — PANTOPRAZOLE SODIUM 40 MG: 40 TABLET, DELAYED RELEASE ORAL at 08:05

## 2021-05-27 RX ADMIN — ALLOPURINOL 100 MG: 100 TABLET ORAL at 08:05

## 2021-05-27 RX ADMIN — METOPROLOL SUCCINATE 25 MG: 25 TABLET, EXTENDED RELEASE ORAL at 08:05

## 2021-05-27 RX ADMIN — ACETAMINOPHEN 650 MG: 325 TABLET ORAL at 12:05

## 2021-05-28 ENCOUNTER — OFFICE VISIT (OUTPATIENT)
Dept: CARDIOLOGY | Facility: CLINIC | Age: 51
End: 2021-05-28
Payer: MEDICAID

## 2021-05-28 VITALS
DIASTOLIC BLOOD PRESSURE: 95 MMHG | SYSTOLIC BLOOD PRESSURE: 158 MMHG | HEART RATE: 71 BPM | WEIGHT: 315 LBS | OXYGEN SATURATION: 98 % | RESPIRATION RATE: 19 BRPM | BODY MASS INDEX: 44.1 KG/M2 | HEIGHT: 71 IN

## 2021-05-28 DIAGNOSIS — G47.19 EXCESSIVE DAYTIME SLEEPINESS: ICD-10-CM

## 2021-05-28 DIAGNOSIS — D64.9 ANEMIA, UNSPECIFIED TYPE: ICD-10-CM

## 2021-05-28 DIAGNOSIS — Z55.0 LITERACY LEVEL OF ILLITERATE: ICD-10-CM

## 2021-05-28 DIAGNOSIS — E88.09 HYPOALBUMINEMIA: ICD-10-CM

## 2021-05-28 DIAGNOSIS — M1A.09X0 IDIOPATHIC CHRONIC GOUT OF MULTIPLE SITES WITHOUT TOPHUS: ICD-10-CM

## 2021-05-28 DIAGNOSIS — E11.9 TYPE 2 DIABETES MELLITUS WITHOUT COMPLICATION, WITHOUT LONG-TERM CURRENT USE OF INSULIN: ICD-10-CM

## 2021-05-28 DIAGNOSIS — Z82.0 FAMILY HISTORY OF SLEEP APNEA: ICD-10-CM

## 2021-05-28 DIAGNOSIS — E66.01 MORBID OBESITY: ICD-10-CM

## 2021-05-28 DIAGNOSIS — Z91.89 SEDENTARY LIFESTYLE: ICD-10-CM

## 2021-05-28 DIAGNOSIS — I10 ESSENTIAL HYPERTENSION: ICD-10-CM

## 2021-05-28 DIAGNOSIS — R53.82 CHRONIC FATIGUE: ICD-10-CM

## 2021-05-28 DIAGNOSIS — R14.2 BELCHING: ICD-10-CM

## 2021-05-28 DIAGNOSIS — Z91.89 CARDIOVASCULAR EVENT RISK: ICD-10-CM

## 2021-05-28 DIAGNOSIS — R07.2 PRECORDIAL PAIN: Primary | ICD-10-CM

## 2021-05-28 DIAGNOSIS — Z82.49 FAMILY HISTORY OF PREMATURE CAD: ICD-10-CM

## 2021-05-28 PROCEDURE — 99999 PR PBB SHADOW E&M-EST. PATIENT-LVL IV: CPT | Mod: PBBFAC,,, | Performed by: INTERNAL MEDICINE

## 2021-05-28 PROCEDURE — 99205 PR OFFICE/OUTPT VISIT, NEW, LEVL V, 60-74 MIN: ICD-10-PCS | Mod: S$PBB,,, | Performed by: INTERNAL MEDICINE

## 2021-05-28 PROCEDURE — 99214 OFFICE O/P EST MOD 30 MIN: CPT | Mod: PBBFAC | Performed by: INTERNAL MEDICINE

## 2021-05-28 PROCEDURE — 99205 OFFICE O/P NEW HI 60 MIN: CPT | Mod: S$PBB,,, | Performed by: INTERNAL MEDICINE

## 2021-05-28 PROCEDURE — 99999 PR PBB SHADOW E&M-EST. PATIENT-LVL IV: ICD-10-PCS | Mod: PBBFAC,,, | Performed by: INTERNAL MEDICINE

## 2021-05-28 RX ORDER — METOPROLOL TARTRATE 25 MG/1
25 TABLET, FILM COATED ORAL 2 TIMES DAILY
COMMUNITY
Start: 2021-05-07 | End: 2021-06-10

## 2021-05-28 SDOH — SOCIAL DETERMINANTS OF HEALTH (SDOH): ILITERACY AND LOW LEVEL LITERACY: Z55.0

## 2021-05-31 ENCOUNTER — TELEPHONE (OUTPATIENT)
Dept: CARDIOLOGY | Facility: CLINIC | Age: 51
End: 2021-05-31

## 2021-05-31 ENCOUNTER — LAB VISIT (OUTPATIENT)
Dept: LAB | Facility: HOSPITAL | Age: 51
End: 2021-05-31
Attending: INTERNAL MEDICINE
Payer: MEDICAID

## 2021-05-31 DIAGNOSIS — I10 ESSENTIAL HYPERTENSION: ICD-10-CM

## 2021-05-31 DIAGNOSIS — Z91.89 CARDIOVASCULAR EVENT RISK: Primary | ICD-10-CM

## 2021-05-31 DIAGNOSIS — E11.9 TYPE 2 DIABETES MELLITUS WITHOUT COMPLICATION, WITHOUT LONG-TERM CURRENT USE OF INSULIN: ICD-10-CM

## 2021-05-31 DIAGNOSIS — Z82.49 FAMILY HISTORY OF PREMATURE CAD: ICD-10-CM

## 2021-05-31 LAB
ALBUMIN/CREAT UR: 5 UG/MG (ref 0–30)
CREAT UR-MCNC: 80 MG/DL (ref 23–375)
MICROALBUMIN UR DL<=1MG/L-MCNC: 4 UG/ML

## 2021-05-31 PROCEDURE — 82570 ASSAY OF URINE CREATININE: CPT | Performed by: INTERNAL MEDICINE

## 2021-05-31 PROCEDURE — 82043 UR ALBUMIN QUANTITATIVE: CPT | Performed by: INTERNAL MEDICINE

## 2021-05-31 RX ORDER — ATORVASTATIN CALCIUM 10 MG/1
10 TABLET, FILM COATED ORAL DAILY
Qty: 30 TABLET | Refills: 11 | Status: SHIPPED | OUTPATIENT
Start: 2021-05-31 | End: 2021-08-10 | Stop reason: SDUPTHER

## 2021-06-02 ENCOUNTER — PATIENT MESSAGE (OUTPATIENT)
Dept: CARDIOLOGY | Facility: CLINIC | Age: 51
End: 2021-06-02

## 2021-06-07 ENCOUNTER — OFFICE VISIT (OUTPATIENT)
Dept: GASTROENTEROLOGY | Facility: CLINIC | Age: 51
End: 2021-06-07
Payer: MEDICAID

## 2021-06-07 VITALS
BODY MASS INDEX: 43.26 KG/M2 | WEIGHT: 309 LBS | SYSTOLIC BLOOD PRESSURE: 127 MMHG | HEIGHT: 71 IN | RESPIRATION RATE: 18 BRPM | HEART RATE: 77 BPM | OXYGEN SATURATION: 97 % | DIASTOLIC BLOOD PRESSURE: 89 MMHG

## 2021-06-07 DIAGNOSIS — R19.8 SYMPTOMS OF GASTROESOPHAGEAL REFLUX: ICD-10-CM

## 2021-06-07 DIAGNOSIS — K21.9 GASTROESOPHAGEAL REFLUX DISEASE, UNSPECIFIED WHETHER ESOPHAGITIS PRESENT: ICD-10-CM

## 2021-06-07 DIAGNOSIS — K57.90 DIVERTICULOSIS: ICD-10-CM

## 2021-06-07 DIAGNOSIS — R14.2 BELCHING: ICD-10-CM

## 2021-06-07 DIAGNOSIS — R07.2 PRECORDIAL PAIN: ICD-10-CM

## 2021-06-07 DIAGNOSIS — E11.9 TYPE 2 DIABETES MELLITUS WITHOUT COMPLICATION, WITHOUT LONG-TERM CURRENT USE OF INSULIN: ICD-10-CM

## 2021-06-07 DIAGNOSIS — E66.01 MORBID OBESITY: Primary | ICD-10-CM

## 2021-06-07 PROCEDURE — 99999 PR PBB SHADOW E&M-EST. PATIENT-LVL IV: ICD-10-PCS | Mod: PBBFAC,,, | Performed by: INTERNAL MEDICINE

## 2021-06-07 PROCEDURE — 99999 PR PBB SHADOW E&M-EST. PATIENT-LVL IV: CPT | Mod: PBBFAC,,, | Performed by: INTERNAL MEDICINE

## 2021-06-07 PROCEDURE — 99204 PR OFFICE/OUTPT VISIT, NEW, LEVL IV, 45-59 MIN: ICD-10-PCS | Mod: S$PBB,,, | Performed by: INTERNAL MEDICINE

## 2021-06-07 PROCEDURE — 99204 OFFICE O/P NEW MOD 45 MIN: CPT | Mod: S$PBB,,, | Performed by: INTERNAL MEDICINE

## 2021-06-07 PROCEDURE — 99214 OFFICE O/P EST MOD 30 MIN: CPT | Mod: PBBFAC,PN | Performed by: INTERNAL MEDICINE

## 2021-06-07 RX ORDER — METOPROLOL SUCCINATE 50 MG/1
TABLET, EXTENDED RELEASE ORAL
COMMUNITY
Start: 2021-05-28 | End: 2021-06-10 | Stop reason: SDUPTHER

## 2021-06-08 ENCOUNTER — HOSPITAL ENCOUNTER (OUTPATIENT)
Dept: RADIOLOGY | Facility: HOSPITAL | Age: 51
Discharge: HOME | End: 2021-06-08
Attending: INTERNAL MEDICINE
Payer: MEDICAID

## 2021-06-08 ENCOUNTER — TELEPHONE (OUTPATIENT)
Dept: GASTROENTEROLOGY | Facility: CLINIC | Age: 51
End: 2021-06-08

## 2021-06-08 DIAGNOSIS — K21.9 GASTROESOPHAGEAL REFLUX DISEASE, UNSPECIFIED WHETHER ESOPHAGITIS PRESENT: ICD-10-CM

## 2021-06-08 PROCEDURE — 74246 X-RAY XM UPR GI TRC 2CNTRST: CPT | Mod: TC,FY

## 2021-06-08 PROCEDURE — 74246 FL UPPER GI AIR CONTRAST WITH ESOPHAGRAM: ICD-10-PCS | Mod: 26,,, | Performed by: RADIOLOGY

## 2021-06-08 PROCEDURE — A9698 NON-RAD CONTRAST MATERIALNOC: HCPCS | Performed by: INTERNAL MEDICINE

## 2021-06-08 PROCEDURE — 25500020 PHARM REV CODE 255: Performed by: INTERNAL MEDICINE

## 2021-06-08 PROCEDURE — 74246 X-RAY XM UPR GI TRC 2CNTRST: CPT | Mod: 26,,, | Performed by: RADIOLOGY

## 2021-06-08 RX ADMIN — BARIUM SULFATE 100 ML: 0.6 SUSPENSION ORAL at 10:06

## 2021-06-08 RX ADMIN — BARIUM SULFATE 140 ML: 980 POWDER, FOR SUSPENSION ORAL at 10:06

## 2021-06-10 ENCOUNTER — HOSPITAL ENCOUNTER (INPATIENT)
Facility: HOSPITAL | Age: 51
LOS: 2 days | Discharge: HOME | End: 2021-06-13
Attending: FAMILY MEDICINE | Admitting: HOSPITALIST
Payer: MEDICAID

## 2021-06-10 ENCOUNTER — TELEPHONE (OUTPATIENT)
Dept: GASTROENTEROLOGY | Facility: CLINIC | Age: 51
End: 2021-06-10

## 2021-06-10 ENCOUNTER — OFFICE VISIT (OUTPATIENT)
Dept: FAMILY MEDICINE | Facility: CLINIC | Age: 51
End: 2021-06-10
Payer: MEDICAID

## 2021-06-10 VITALS
HEIGHT: 71 IN | SYSTOLIC BLOOD PRESSURE: 123 MMHG | DIASTOLIC BLOOD PRESSURE: 79 MMHG | RESPIRATION RATE: 17 BRPM | WEIGHT: 306.81 LBS | HEART RATE: 86 BPM | BODY MASS INDEX: 42.95 KG/M2 | OXYGEN SATURATION: 97 %

## 2021-06-10 DIAGNOSIS — K21.00 HIATAL HERNIA WITH GERD AND ESOPHAGITIS: ICD-10-CM

## 2021-06-10 DIAGNOSIS — M1A.09X0 IDIOPATHIC CHRONIC GOUT OF MULTIPLE SITES WITHOUT TOPHUS: ICD-10-CM

## 2021-06-10 DIAGNOSIS — Z23 NEED FOR TDAP VACCINATION: ICD-10-CM

## 2021-06-10 DIAGNOSIS — E11.9 TYPE 2 DIABETES MELLITUS WITHOUT COMPLICATION, WITHOUT LONG-TERM CURRENT USE OF INSULIN: ICD-10-CM

## 2021-06-10 DIAGNOSIS — F32.A DEPRESSION, UNSPECIFIED DEPRESSION TYPE: ICD-10-CM

## 2021-06-10 DIAGNOSIS — K44.9 HIATAL HERNIA WITH GERD AND ESOPHAGITIS: ICD-10-CM

## 2021-06-10 DIAGNOSIS — U07.1 COVID-19: Primary | ICD-10-CM

## 2021-06-10 DIAGNOSIS — Z82.0 FAMILY HISTORY OF SLEEP APNEA: ICD-10-CM

## 2021-06-10 DIAGNOSIS — R06.02 SHORTNESS OF BREATH: ICD-10-CM

## 2021-06-10 DIAGNOSIS — Z76.89 ENCOUNTER TO ESTABLISH CARE: Primary | ICD-10-CM

## 2021-06-10 DIAGNOSIS — J18.9 PNEUMONIA OF LEFT LOWER LOBE DUE TO INFECTIOUS ORGANISM: ICD-10-CM

## 2021-06-10 DIAGNOSIS — R06.02 SOB (SHORTNESS OF BREATH): ICD-10-CM

## 2021-06-10 DIAGNOSIS — G47.19 EXCESSIVE DAYTIME SLEEPINESS: ICD-10-CM

## 2021-06-10 DIAGNOSIS — Z01.818 PREOP TESTING: Primary | ICD-10-CM

## 2021-06-10 DIAGNOSIS — I10 ESSENTIAL HYPERTENSION: ICD-10-CM

## 2021-06-10 DIAGNOSIS — Z09 HOSPITAL DISCHARGE FOLLOW-UP: ICD-10-CM

## 2021-06-10 DIAGNOSIS — R05.3 CHRONIC COUGH: ICD-10-CM

## 2021-06-10 DIAGNOSIS — K21.9 GASTROESOPHAGEAL REFLUX DISEASE, UNSPECIFIED WHETHER ESOPHAGITIS PRESENT: ICD-10-CM

## 2021-06-10 DIAGNOSIS — K44.9 HIATAL HERNIA: Primary | ICD-10-CM

## 2021-06-10 LAB
BASOPHILS # BLD AUTO: 0.01 K/UL (ref 0–0.2)
BASOPHILS NFR BLD: 0.1 % (ref 0–1.9)
D DIMER PPP IA.FEU-MCNC: 1.62 MG/L FEU
DIFFERENTIAL METHOD: ABNORMAL
EOSINOPHIL # BLD AUTO: 0 K/UL (ref 0–0.5)
EOSINOPHIL NFR BLD: 0 % (ref 0–8)
ERYTHROCYTE [DISTWIDTH] IN BLOOD BY AUTOMATED COUNT: 11.9 % (ref 11.5–14.5)
HCT VFR BLD AUTO: 43.3 % (ref 40–54)
HGB BLD-MCNC: 15.4 G/DL (ref 14–18)
IMM GRANULOCYTES # BLD AUTO: 0.03 K/UL (ref 0–0.04)
IMM GRANULOCYTES NFR BLD AUTO: 0.4 % (ref 0–0.5)
INFLUENZA A, MOLECULAR: NEGATIVE
INFLUENZA B, MOLECULAR: NEGATIVE
LYMPHOCYTES # BLD AUTO: 1.1 K/UL (ref 1–4.8)
LYMPHOCYTES NFR BLD: 14.8 % (ref 18–48)
MCH RBC QN AUTO: 31.8 PG (ref 27–31)
MCHC RBC AUTO-ENTMCNC: 35.6 G/DL (ref 32–36)
MCV RBC AUTO: 89 FL (ref 82–98)
MONOCYTES # BLD AUTO: 0.7 K/UL (ref 0.3–1)
MONOCYTES NFR BLD: 8.7 % (ref 4–15)
NEUTROPHILS # BLD AUTO: 5.9 K/UL (ref 1.8–7.7)
NEUTROPHILS NFR BLD: 76 % (ref 38–73)
NRBC BLD-RTO: 0 /100 WBC
PLATELET # BLD AUTO: 142 K/UL (ref 150–450)
PMV BLD AUTO: 9.8 FL (ref 9.2–12.9)
RBC # BLD AUTO: 4.85 M/UL (ref 4.6–6.2)
SARS-COV-2 RDRP RESP QL NAA+PROBE: POSITIVE
SPECIMEN SOURCE: NORMAL
TROPONIN I SERPL DL<=0.01 NG/ML-MCNC: 0.02 NG/ML (ref 0–0.03)
WBC # BLD AUTO: 7.7 K/UL (ref 3.9–12.7)

## 2021-06-10 PROCEDURE — 99204 PR OFFICE/OUTPT VISIT, NEW, LEVL IV, 45-59 MIN: ICD-10-PCS | Mod: 25,S$GLB,, | Performed by: FAMILY MEDICINE

## 2021-06-10 PROCEDURE — 71045 X-RAY EXAM CHEST 1 VIEW: CPT | Mod: 26,,, | Performed by: RADIOLOGY

## 2021-06-10 PROCEDURE — 96374 THER/PROPH/DIAG INJ IV PUSH: CPT

## 2021-06-10 PROCEDURE — 90715 TDAP VACCINE GREATER THAN OR EQUAL TO 7YO IM: ICD-10-PCS | Mod: S$GLB,,, | Performed by: FAMILY MEDICINE

## 2021-06-10 PROCEDURE — 96375 TX/PRO/DX INJ NEW DRUG ADDON: CPT

## 2021-06-10 PROCEDURE — 87502 INFLUENZA DNA AMP PROBE: CPT | Performed by: FAMILY MEDICINE

## 2021-06-10 PROCEDURE — 99204 OFFICE O/P NEW MOD 45 MIN: CPT | Mod: 25,S$GLB,, | Performed by: FAMILY MEDICINE

## 2021-06-10 PROCEDURE — U0002 COVID-19 LAB TEST NON-CDC: HCPCS | Performed by: FAMILY MEDICINE

## 2021-06-10 PROCEDURE — 71045 X-RAY EXAM CHEST 1 VIEW: CPT | Mod: TC,FY

## 2021-06-10 PROCEDURE — 85379 FIBRIN DEGRADATION QUANT: CPT | Performed by: FAMILY MEDICINE

## 2021-06-10 PROCEDURE — 90471 TDAP VACCINE GREATER THAN OR EQUAL TO 7YO IM: ICD-10-PCS | Mod: S$GLB,,, | Performed by: FAMILY MEDICINE

## 2021-06-10 PROCEDURE — 84484 ASSAY OF TROPONIN QUANT: CPT | Performed by: FAMILY MEDICINE

## 2021-06-10 PROCEDURE — 80053 COMPREHEN METABOLIC PANEL: CPT | Performed by: FAMILY MEDICINE

## 2021-06-10 PROCEDURE — 90715 TDAP VACCINE 7 YRS/> IM: CPT | Mod: S$GLB,,, | Performed by: FAMILY MEDICINE

## 2021-06-10 PROCEDURE — 71045 XR CHEST AP PORTABLE: ICD-10-PCS | Mod: 26,,, | Performed by: RADIOLOGY

## 2021-06-10 PROCEDURE — 85025 COMPLETE CBC W/AUTO DIFF WBC: CPT | Performed by: FAMILY MEDICINE

## 2021-06-10 PROCEDURE — 83880 ASSAY OF NATRIURETIC PEPTIDE: CPT | Performed by: FAMILY MEDICINE

## 2021-06-10 PROCEDURE — 90471 IMMUNIZATION ADMIN: CPT | Mod: S$GLB,,, | Performed by: FAMILY MEDICINE

## 2021-06-10 PROCEDURE — 99285 EMERGENCY DEPT VISIT HI MDM: CPT | Mod: 25

## 2021-06-10 RX ORDER — AZITHROMYCIN 250 MG/1
TABLET, FILM COATED ORAL
Qty: 6 TABLET | Refills: 0 | Status: ON HOLD | OUTPATIENT
Start: 2021-06-10 | End: 2021-06-13 | Stop reason: HOSPADM

## 2021-06-10 RX ORDER — BENZONATATE 100 MG/1
100 CAPSULE ORAL 3 TIMES DAILY PRN
Qty: 30 CAPSULE | Refills: 2 | Status: SHIPPED | OUTPATIENT
Start: 2021-06-10 | End: 2021-06-20

## 2021-06-10 RX ORDER — METOPROLOL SUCCINATE 50 MG/1
50 TABLET, EXTENDED RELEASE ORAL DAILY
Qty: 90 TABLET | Refills: 3 | Status: SHIPPED | OUTPATIENT
Start: 2021-06-10 | End: 2021-08-10 | Stop reason: SDUPTHER

## 2021-06-10 RX ORDER — BUPROPION HYDROCHLORIDE 150 MG/1
150 TABLET ORAL DAILY
Qty: 30 TABLET | Refills: 11 | Status: SHIPPED | OUTPATIENT
Start: 2021-06-10 | End: 2021-08-10 | Stop reason: SDUPTHER

## 2021-06-10 RX ORDER — CLONIDINE HYDROCHLORIDE 0.2 MG/1
0.2 TABLET ORAL DAILY
Qty: 30 TABLET | Refills: 5 | Status: SHIPPED | OUTPATIENT
Start: 2021-06-10 | End: 2021-08-10 | Stop reason: SDUPTHER

## 2021-06-10 RX ORDER — ALLOPURINOL 100 MG/1
100 TABLET ORAL DAILY
Qty: 90 TABLET | Refills: 3 | Status: SHIPPED | OUTPATIENT
Start: 2021-06-10 | End: 2022-03-08 | Stop reason: SDUPTHER

## 2021-06-10 RX ORDER — SODIUM CHLORIDE 0.9 % (FLUSH) 0.9 %
10 SYRINGE (ML) INJECTION
Status: CANCELLED | OUTPATIENT
Start: 2021-06-10

## 2021-06-11 PROBLEM — U07.1 COVID-19: Status: ACTIVE | Noted: 2021-06-11

## 2021-06-11 LAB
ALBUMIN SERPL BCP-MCNC: 3.3 G/DL (ref 3.5–5.2)
ALBUMIN SERPL BCP-MCNC: 3.3 G/DL (ref 3.5–5.2)
ALBUMIN SERPL BCP-MCNC: 3.4 G/DL (ref 3.5–5.2)
ALLENS TEST: ABNORMAL
ALP SERPL-CCNC: 75 U/L (ref 55–135)
ALP SERPL-CCNC: 76 U/L (ref 55–135)
ALP SERPL-CCNC: 76 U/L (ref 55–135)
ALT SERPL W/O P-5'-P-CCNC: 55 U/L (ref 10–44)
ALT SERPL W/O P-5'-P-CCNC: 60 U/L (ref 10–44)
ALT SERPL W/O P-5'-P-CCNC: 60 U/L (ref 10–44)
ANION GAP SERPL CALC-SCNC: 14 MMOL/L (ref 8–16)
ANION GAP SERPL CALC-SCNC: 14 MMOL/L (ref 8–16)
ANION GAP SERPL CALC-SCNC: 15 MMOL/L (ref 8–16)
AST SERPL-CCNC: 100 U/L (ref 10–40)
AST SERPL-CCNC: 99 U/L (ref 10–40)
AST SERPL-CCNC: 99 U/L (ref 10–40)
BASOPHILS # BLD AUTO: 0.01 K/UL (ref 0–0.2)
BASOPHILS # BLD AUTO: 0.01 K/UL (ref 0–0.2)
BASOPHILS NFR BLD: 0.1 % (ref 0–1.9)
BASOPHILS NFR BLD: 0.1 % (ref 0–1.9)
BILIRUB SERPL-MCNC: 0.8 MG/DL (ref 0.1–1)
BILIRUB SERPL-MCNC: 0.9 MG/DL (ref 0.1–1)
BILIRUB SERPL-MCNC: 0.9 MG/DL (ref 0.1–1)
BNP SERPL-MCNC: <10 PG/ML (ref 0–99)
BUN SERPL-MCNC: 19 MG/DL (ref 6–20)
BUN SERPL-MCNC: 19 MG/DL (ref 6–20)
BUN SERPL-MCNC: 20 MG/DL (ref 6–20)
CALCIUM SERPL-MCNC: 8.9 MG/DL (ref 8.7–10.5)
CHLORIDE SERPL-SCNC: 100 MMOL/L (ref 95–110)
CHLORIDE SERPL-SCNC: 99 MMOL/L (ref 95–110)
CHLORIDE SERPL-SCNC: 99 MMOL/L (ref 95–110)
CK SERPL-CCNC: 2301 U/L (ref 20–200)
CO2 SERPL-SCNC: 19 MMOL/L (ref 23–29)
CO2 SERPL-SCNC: 21 MMOL/L (ref 23–29)
CO2 SERPL-SCNC: 21 MMOL/L (ref 23–29)
CREAT SERPL-MCNC: 1 MG/DL (ref 0.5–1.4)
CRP SERPL-MCNC: 78.3 MG/L (ref 0–8.2)
DELSYS: ABNORMAL
DIFFERENTIAL METHOD: ABNORMAL
DIFFERENTIAL METHOD: ABNORMAL
EOSINOPHIL # BLD AUTO: 0 K/UL (ref 0–0.5)
EOSINOPHIL # BLD AUTO: 0 K/UL (ref 0–0.5)
EOSINOPHIL NFR BLD: 0 % (ref 0–8)
EOSINOPHIL NFR BLD: 0 % (ref 0–8)
ERYTHROCYTE [DISTWIDTH] IN BLOOD BY AUTOMATED COUNT: 11.9 % (ref 11.5–14.5)
ERYTHROCYTE [DISTWIDTH] IN BLOOD BY AUTOMATED COUNT: 11.9 % (ref 11.5–14.5)
ERYTHROCYTE [SEDIMENTATION RATE] IN BLOOD BY WESTERGREN METHOD: >90 MM/HR (ref 0–10)
EST. GFR  (AFRICAN AMERICAN): >60 ML/MIN/1.73 M^2
EST. GFR  (NON AFRICAN AMERICAN): >60 ML/MIN/1.73 M^2
FERRITIN SERPL-MCNC: 971 NG/ML (ref 20–300)
FERRITIN SERPL-MCNC: 971 NG/ML (ref 20–300)
GLUCOSE SERPL-MCNC: 137 MG/DL (ref 70–110)
GLUCOSE SERPL-MCNC: 190 MG/DL (ref 70–110)
GLUCOSE SERPL-MCNC: 190 MG/DL (ref 70–110)
HCO3 UR-SCNC: 22.8 MMOL/L (ref 24–28)
HCT VFR BLD AUTO: 43 % (ref 40–54)
HCT VFR BLD AUTO: 43 % (ref 40–54)
HGB BLD-MCNC: 15.1 G/DL (ref 14–18)
HGB BLD-MCNC: 15.1 G/DL (ref 14–18)
IMM GRANULOCYTES # BLD AUTO: 0.04 K/UL (ref 0–0.04)
IMM GRANULOCYTES # BLD AUTO: 0.04 K/UL (ref 0–0.04)
IMM GRANULOCYTES NFR BLD AUTO: 0.5 % (ref 0–0.5)
IMM GRANULOCYTES NFR BLD AUTO: 0.5 % (ref 0–0.5)
LACTATE SERPL-SCNC: 1.7 MMOL/L (ref 0.5–2.2)
LDH SERPL L TO P-CCNC: 626 U/L (ref 110–260)
LYMPHOCYTES # BLD AUTO: 0.8 K/UL (ref 1–4.8)
LYMPHOCYTES # BLD AUTO: 0.8 K/UL (ref 1–4.8)
LYMPHOCYTES NFR BLD: 10.2 % (ref 18–48)
LYMPHOCYTES NFR BLD: 10.2 % (ref 18–48)
MAGNESIUM SERPL-MCNC: 1.9 MG/DL (ref 1.6–2.6)
MCH RBC QN AUTO: 31.6 PG (ref 27–31)
MCH RBC QN AUTO: 31.6 PG (ref 27–31)
MCHC RBC AUTO-ENTMCNC: 35.1 G/DL (ref 32–36)
MCHC RBC AUTO-ENTMCNC: 35.1 G/DL (ref 32–36)
MCV RBC AUTO: 90 FL (ref 82–98)
MCV RBC AUTO: 90 FL (ref 82–98)
MONOCYTES # BLD AUTO: 0.5 K/UL (ref 0.3–1)
MONOCYTES # BLD AUTO: 0.5 K/UL (ref 0.3–1)
MONOCYTES NFR BLD: 5.9 % (ref 4–15)
MONOCYTES NFR BLD: 5.9 % (ref 4–15)
NEUTROPHILS # BLD AUTO: 6.4 K/UL (ref 1.8–7.7)
NEUTROPHILS # BLD AUTO: 6.4 K/UL (ref 1.8–7.7)
NEUTROPHILS NFR BLD: 83.3 % (ref 38–73)
NEUTROPHILS NFR BLD: 83.3 % (ref 38–73)
NRBC BLD-RTO: 0 /100 WBC
NRBC BLD-RTO: 0 /100 WBC
PCO2 BLDA: 32.4 MMHG (ref 35–45)
PH SMN: 7.46 [PH] (ref 7.35–7.45)
PHOSPHATE SERPL-MCNC: 2.5 MG/DL (ref 2.7–4.5)
PLATELET # BLD AUTO: 149 K/UL (ref 150–450)
PLATELET # BLD AUTO: 149 K/UL (ref 150–450)
PMV BLD AUTO: 10.1 FL (ref 9.2–12.9)
PMV BLD AUTO: 10.1 FL (ref 9.2–12.9)
PO2 BLDA: 75 MMHG (ref 80–100)
POC BE: -1 MMOL/L
POC SATURATED O2: 96 % (ref 95–100)
POC TCO2: 24 MMOL/L (ref 23–27)
POCT GLUCOSE: 186 MG/DL (ref 70–110)
POCT GLUCOSE: 192 MG/DL (ref 70–110)
POCT GLUCOSE: 255 MG/DL (ref 70–110)
POTASSIUM SERPL-SCNC: 3.6 MMOL/L (ref 3.5–5.1)
POTASSIUM SERPL-SCNC: 3.6 MMOL/L (ref 3.5–5.1)
POTASSIUM SERPL-SCNC: 3.9 MMOL/L (ref 3.5–5.1)
PROCALCITONIN SERPL IA-MCNC: 0.12 NG/ML
PROT SERPL-MCNC: 8.1 G/DL (ref 6–8.4)
PROT SERPL-MCNC: 8.1 G/DL (ref 6–8.4)
PROT SERPL-MCNC: 8.2 G/DL (ref 6–8.4)
RBC # BLD AUTO: 4.78 M/UL (ref 4.6–6.2)
RBC # BLD AUTO: 4.78 M/UL (ref 4.6–6.2)
SAMPLE: ABNORMAL
SITE: ABNORMAL
SODIUM SERPL-SCNC: 134 MMOL/L (ref 136–145)
WBC # BLD AUTO: 7.65 K/UL (ref 3.9–12.7)
WBC # BLD AUTO: 7.65 K/UL (ref 3.9–12.7)

## 2021-06-11 PROCEDURE — 84100 ASSAY OF PHOSPHORUS: CPT | Performed by: HOSPITALIST

## 2021-06-11 PROCEDURE — 25000003 PHARM REV CODE 250

## 2021-06-11 PROCEDURE — 25000003 PHARM REV CODE 250: Performed by: HOSPITALIST

## 2021-06-11 PROCEDURE — 85025 COMPLETE CBC W/AUTO DIFF WBC: CPT | Performed by: HOSPITALIST

## 2021-06-11 PROCEDURE — 36600 WITHDRAWAL OF ARTERIAL BLOOD: CPT

## 2021-06-11 PROCEDURE — 94761 N-INVAS EAR/PLS OXIMETRY MLT: CPT

## 2021-06-11 PROCEDURE — 63600175 PHARM REV CODE 636 W HCPCS

## 2021-06-11 PROCEDURE — 82550 ASSAY OF CK (CPK): CPT | Performed by: HOSPITALIST

## 2021-06-11 PROCEDURE — 36415 COLL VENOUS BLD VENIPUNCTURE: CPT | Performed by: HOSPITALIST

## 2021-06-11 PROCEDURE — 99222 1ST HOSP IP/OBS MODERATE 55: CPT | Mod: ,,, | Performed by: FAMILY MEDICINE

## 2021-06-11 PROCEDURE — 27000221 HC OXYGEN, UP TO 24 HOURS

## 2021-06-11 PROCEDURE — 21400001 HC TELEMETRY ROOM

## 2021-06-11 PROCEDURE — 25000003 PHARM REV CODE 250: Performed by: FAMILY MEDICINE

## 2021-06-11 PROCEDURE — 86140 C-REACTIVE PROTEIN: CPT | Performed by: HOSPITALIST

## 2021-06-11 PROCEDURE — 83615 LACTATE (LD) (LDH) ENZYME: CPT | Performed by: HOSPITALIST

## 2021-06-11 PROCEDURE — 63600175 PHARM REV CODE 636 W HCPCS: Performed by: FAMILY MEDICINE

## 2021-06-11 PROCEDURE — 83735 ASSAY OF MAGNESIUM: CPT | Performed by: HOSPITALIST

## 2021-06-11 PROCEDURE — 99222 PR INITIAL HOSPITAL CARE,LEVL II: ICD-10-PCS | Mod: ,,, | Performed by: FAMILY MEDICINE

## 2021-06-11 PROCEDURE — 63600175 PHARM REV CODE 636 W HCPCS: Performed by: HOSPITALIST

## 2021-06-11 PROCEDURE — 25000242 PHARM REV CODE 250 ALT 637 W/ HCPCS: Performed by: HOSPITALIST

## 2021-06-11 PROCEDURE — 82803 BLOOD GASES ANY COMBINATION: CPT

## 2021-06-11 PROCEDURE — 94640 AIRWAY INHALATION TREATMENT: CPT

## 2021-06-11 PROCEDURE — 99900035 HC TECH TIME PER 15 MIN (STAT)

## 2021-06-11 PROCEDURE — 83605 ASSAY OF LACTIC ACID: CPT | Performed by: HOSPITALIST

## 2021-06-11 PROCEDURE — 85651 RBC SED RATE NONAUTOMATED: CPT | Performed by: HOSPITALIST

## 2021-06-11 PROCEDURE — 82728 ASSAY OF FERRITIN: CPT | Performed by: HOSPITALIST

## 2021-06-11 PROCEDURE — 80053 COMPREHEN METABOLIC PANEL: CPT | Performed by: HOSPITALIST

## 2021-06-11 PROCEDURE — 84145 PROCALCITONIN (PCT): CPT | Performed by: HOSPITALIST

## 2021-06-11 RX ORDER — POTASSIUM CHLORIDE 750 MG/1
50 TABLET, EXTENDED RELEASE ORAL
Status: DISCONTINUED | OUTPATIENT
Start: 2021-06-11 | End: 2021-06-13 | Stop reason: HOSPADM

## 2021-06-11 RX ORDER — LANOLIN ALCOHOL/MO/W.PET/CERES
800 CREAM (GRAM) TOPICAL
Status: DISCONTINUED | OUTPATIENT
Start: 2021-06-11 | End: 2021-06-13 | Stop reason: HOSPADM

## 2021-06-11 RX ORDER — SODIUM CHLORIDE 0.9 % (FLUSH) 0.9 %
10 SYRINGE (ML) INJECTION EVERY 6 HOURS
Status: DISCONTINUED | OUTPATIENT
Start: 2021-06-11 | End: 2021-06-11

## 2021-06-11 RX ORDER — POTASSIUM CHLORIDE 20 MEQ/1
60 TABLET, EXTENDED RELEASE ORAL
Status: DISCONTINUED | OUTPATIENT
Start: 2021-06-11 | End: 2021-06-13 | Stop reason: HOSPADM

## 2021-06-11 RX ORDER — LEVOFLOXACIN 5 MG/ML
INJECTION, SOLUTION INTRAVENOUS
Status: COMPLETED
Start: 2021-06-11 | End: 2021-06-11

## 2021-06-11 RX ORDER — LEVOFLOXACIN 5 MG/ML
750 INJECTION, SOLUTION INTRAVENOUS
Status: ACTIVE | OUTPATIENT
Start: 2021-06-11 | End: 2021-06-11

## 2021-06-11 RX ORDER — SODIUM CHLORIDE 0.9 % (FLUSH) 0.9 %
10 SYRINGE (ML) INJECTION
Status: DISCONTINUED | OUTPATIENT
Start: 2021-06-11 | End: 2021-06-13 | Stop reason: HOSPADM

## 2021-06-11 RX ORDER — IBUPROFEN 200 MG
16 TABLET ORAL
Status: DISCONTINUED | OUTPATIENT
Start: 2021-06-11 | End: 2021-06-13 | Stop reason: HOSPADM

## 2021-06-11 RX ORDER — ATORVASTATIN CALCIUM 10 MG/1
10 TABLET, FILM COATED ORAL DAILY
Status: DISCONTINUED | OUTPATIENT
Start: 2021-06-11 | End: 2021-06-13 | Stop reason: HOSPADM

## 2021-06-11 RX ORDER — POTASSIUM CHLORIDE 20 MEQ/1
40 TABLET, EXTENDED RELEASE ORAL
Status: DISCONTINUED | OUTPATIENT
Start: 2021-06-11 | End: 2021-06-13 | Stop reason: HOSPADM

## 2021-06-11 RX ORDER — INSULIN ASPART 100 [IU]/ML
0-5 INJECTION, SOLUTION INTRAVENOUS; SUBCUTANEOUS
Status: DISCONTINUED | OUTPATIENT
Start: 2021-06-11 | End: 2021-06-13 | Stop reason: HOSPADM

## 2021-06-11 RX ORDER — SODIUM,POTASSIUM PHOSPHATES 280-250MG
2 POWDER IN PACKET (EA) ORAL
Status: DISCONTINUED | OUTPATIENT
Start: 2021-06-11 | End: 2021-06-13 | Stop reason: HOSPADM

## 2021-06-11 RX ORDER — DEXAMETHASONE SODIUM PHOSPHATE 10 MG/ML
10 INJECTION INTRAMUSCULAR; INTRAVENOUS
Status: COMPLETED | OUTPATIENT
Start: 2021-06-11 | End: 2021-06-11

## 2021-06-11 RX ORDER — CLONIDINE HYDROCHLORIDE 0.1 MG/1
0.2 TABLET ORAL NIGHTLY
Status: DISCONTINUED | OUTPATIENT
Start: 2021-06-11 | End: 2021-06-13 | Stop reason: HOSPADM

## 2021-06-11 RX ORDER — ENOXAPARIN SODIUM 100 MG/ML
40 INJECTION SUBCUTANEOUS EVERY 12 HOURS
Status: DISCONTINUED | OUTPATIENT
Start: 2021-06-11 | End: 2021-06-13 | Stop reason: HOSPADM

## 2021-06-11 RX ORDER — GUAIFENESIN/DEXTROMETHORPHAN 100-10MG/5
5 SYRUP ORAL EVERY 4 HOURS PRN
Status: DISCONTINUED | OUTPATIENT
Start: 2021-06-11 | End: 2021-06-13 | Stop reason: HOSPADM

## 2021-06-11 RX ORDER — AMOXICILLIN 250 MG
1 CAPSULE ORAL 2 TIMES DAILY PRN
Status: DISCONTINUED | OUTPATIENT
Start: 2021-06-11 | End: 2021-06-13 | Stop reason: HOSPADM

## 2021-06-11 RX ORDER — METOPROLOL SUCCINATE 25 MG/1
25 TABLET, EXTENDED RELEASE ORAL 2 TIMES DAILY
Status: DISCONTINUED | OUTPATIENT
Start: 2021-06-11 | End: 2021-06-13 | Stop reason: HOSPADM

## 2021-06-11 RX ORDER — DICYCLOMINE HYDROCHLORIDE 10 MG/1
10 CAPSULE ORAL 4 TIMES DAILY PRN
Status: DISCONTINUED | OUTPATIENT
Start: 2021-06-11 | End: 2021-06-13 | Stop reason: HOSPADM

## 2021-06-11 RX ORDER — CHOLECALCIFEROL (VITAMIN D3) 25 MCG
1000 TABLET ORAL DAILY
Status: DISCONTINUED | OUTPATIENT
Start: 2021-06-11 | End: 2021-06-13 | Stop reason: HOSPADM

## 2021-06-11 RX ORDER — GLUCAGON 1 MG
1 KIT INJECTION
Status: DISCONTINUED | OUTPATIENT
Start: 2021-06-11 | End: 2021-06-13 | Stop reason: HOSPADM

## 2021-06-11 RX ORDER — CLONIDINE HYDROCHLORIDE 0.1 MG/1
TABLET ORAL
Status: COMPLETED
Start: 2021-06-11 | End: 2021-06-11

## 2021-06-11 RX ORDER — ONDANSETRON 2 MG/ML
4 INJECTION INTRAMUSCULAR; INTRAVENOUS
Status: COMPLETED | OUTPATIENT
Start: 2021-06-11 | End: 2021-06-11

## 2021-06-11 RX ORDER — ONDANSETRON 2 MG/ML
4 INJECTION INTRAMUSCULAR; INTRAVENOUS EVERY 6 HOURS PRN
Status: DISCONTINUED | OUTPATIENT
Start: 2021-06-11 | End: 2021-06-13 | Stop reason: HOSPADM

## 2021-06-11 RX ORDER — PANTOPRAZOLE SODIUM 40 MG/1
40 TABLET, DELAYED RELEASE ORAL DAILY
Status: DISCONTINUED | OUTPATIENT
Start: 2021-06-11 | End: 2021-06-13 | Stop reason: HOSPADM

## 2021-06-11 RX ORDER — ALLOPURINOL 100 MG/1
100 TABLET ORAL DAILY
Status: DISCONTINUED | OUTPATIENT
Start: 2021-06-11 | End: 2021-06-13 | Stop reason: HOSPADM

## 2021-06-11 RX ORDER — ASCORBIC ACID 500 MG
500 TABLET ORAL 2 TIMES DAILY
Status: DISCONTINUED | OUTPATIENT
Start: 2021-06-11 | End: 2021-06-13 | Stop reason: HOSPADM

## 2021-06-11 RX ORDER — ALBUTEROL SULFATE 90 UG/1
2 AEROSOL, METERED RESPIRATORY (INHALATION) EVERY 6 HOURS
Status: DISCONTINUED | OUTPATIENT
Start: 2021-06-11 | End: 2021-06-13 | Stop reason: HOSPADM

## 2021-06-11 RX ORDER — ACETAMINOPHEN 500 MG
1000 TABLET ORAL EVERY 6 HOURS PRN
Status: DISCONTINUED | OUTPATIENT
Start: 2021-06-11 | End: 2021-06-13 | Stop reason: HOSPADM

## 2021-06-11 RX ORDER — IBUPROFEN 200 MG
24 TABLET ORAL
Status: DISCONTINUED | OUTPATIENT
Start: 2021-06-11 | End: 2021-06-13 | Stop reason: HOSPADM

## 2021-06-11 RX ADMIN — ENOXAPARIN SODIUM 40 MG: 100 INJECTION SUBCUTANEOUS at 08:06

## 2021-06-11 RX ADMIN — AZITHROMYCIN MONOHYDRATE 500 MG: 500 INJECTION, POWDER, LYOPHILIZED, FOR SOLUTION INTRAVENOUS at 01:06

## 2021-06-11 RX ADMIN — ENOXAPARIN SODIUM 40 MG: 100 INJECTION SUBCUTANEOUS at 10:06

## 2021-06-11 RX ADMIN — GUAIFENESIN AND DEXTROMETHORPHAN 5 ML: 100; 10 SYRUP ORAL at 12:06

## 2021-06-11 RX ADMIN — CHOLECALCIFEROL TAB 25 MCG (1000 UNIT) 1000 UNITS: 25 TAB at 08:06

## 2021-06-11 RX ADMIN — REMDESIVIR 200 MG: 100 INJECTION, POWDER, LYOPHILIZED, FOR SOLUTION INTRAVENOUS at 05:06

## 2021-06-11 RX ADMIN — CLONIDINE HYDROCHLORIDE 0.2 MG: 0.1 TABLET ORAL at 02:06

## 2021-06-11 RX ADMIN — ONDANSETRON 4 MG: 2 INJECTION INTRAMUSCULAR; INTRAVENOUS at 02:06

## 2021-06-11 RX ADMIN — OXYCODONE HYDROCHLORIDE AND ACETAMINOPHEN 500 MG: 500 TABLET ORAL at 08:06

## 2021-06-11 RX ADMIN — OXYCODONE HYDROCHLORIDE AND ACETAMINOPHEN 500 MG: 500 TABLET ORAL at 10:06

## 2021-06-11 RX ADMIN — INSULIN ASPART 3 UNITS: 100 INJECTION, SOLUTION INTRAVENOUS; SUBCUTANEOUS at 12:06

## 2021-06-11 RX ADMIN — ALBUTEROL SULFATE 2 PUFF: 90 AEROSOL, METERED RESPIRATORY (INHALATION) at 08:06

## 2021-06-11 RX ADMIN — DEXAMETHASONE 6 MG: 4 TABLET ORAL at 08:06

## 2021-06-11 RX ADMIN — DEXAMETHASONE SODIUM PHOSPHATE 10 MG: 10 INJECTION INTRAMUSCULAR; INTRAVENOUS at 01:06

## 2021-06-11 RX ADMIN — ALBUTEROL SULFATE 2 PUFF: 90 AEROSOL, METERED RESPIRATORY (INHALATION) at 12:06

## 2021-06-11 RX ADMIN — ALLOPURINOL 100 MG: 100 TABLET ORAL at 08:06

## 2021-06-11 RX ADMIN — PANTOPRAZOLE SODIUM 40 MG: 40 TABLET, DELAYED RELEASE ORAL at 08:06

## 2021-06-11 RX ADMIN — METOPROLOL SUCCINATE 25 MG: 25 TABLET, EXTENDED RELEASE ORAL at 08:06

## 2021-06-11 RX ADMIN — LEVOFLOXACIN: 5 INJECTION, SOLUTION INTRAVENOUS at 02:06

## 2021-06-11 RX ADMIN — GUAIFENESIN AND DEXTROMETHORPHAN 5 ML: 100; 10 SYRUP ORAL at 08:06

## 2021-06-11 RX ADMIN — ATORVASTATIN CALCIUM 10 MG: 10 TABLET, FILM COATED ORAL at 08:06

## 2021-06-11 RX ADMIN — MULTIPLE VITAMINS W/ MINERALS TAB 1 TABLET: TAB at 08:06

## 2021-06-11 RX ADMIN — DICYCLOMINE HYDROCHLORIDE 10 MG: 10 CAPSULE ORAL at 08:06

## 2021-06-12 LAB
ALBUMIN SERPL BCP-MCNC: 3 G/DL (ref 3.5–5.2)
ALBUMIN SERPL BCP-MCNC: 3 G/DL (ref 3.5–5.2)
ALP SERPL-CCNC: 69 U/L (ref 55–135)
ALP SERPL-CCNC: 69 U/L (ref 55–135)
ALT SERPL W/O P-5'-P-CCNC: 65 U/L (ref 10–44)
ALT SERPL W/O P-5'-P-CCNC: 65 U/L (ref 10–44)
ANION GAP SERPL CALC-SCNC: 12 MMOL/L (ref 8–16)
ANION GAP SERPL CALC-SCNC: 12 MMOL/L (ref 8–16)
AST SERPL-CCNC: 82 U/L (ref 10–40)
AST SERPL-CCNC: 82 U/L (ref 10–40)
BASOPHILS # BLD AUTO: 0.01 K/UL (ref 0–0.2)
BASOPHILS # BLD AUTO: 0.01 K/UL (ref 0–0.2)
BASOPHILS NFR BLD: 0.2 % (ref 0–1.9)
BASOPHILS NFR BLD: 0.2 % (ref 0–1.9)
BILIRUB SERPL-MCNC: 0.7 MG/DL (ref 0.1–1)
BILIRUB SERPL-MCNC: 0.7 MG/DL (ref 0.1–1)
BUN SERPL-MCNC: 21 MG/DL (ref 6–20)
BUN SERPL-MCNC: 21 MG/DL (ref 6–20)
CALCIUM SERPL-MCNC: 9.2 MG/DL (ref 8.7–10.5)
CALCIUM SERPL-MCNC: 9.2 MG/DL (ref 8.7–10.5)
CHLORIDE SERPL-SCNC: 102 MMOL/L (ref 95–110)
CHLORIDE SERPL-SCNC: 102 MMOL/L (ref 95–110)
CO2 SERPL-SCNC: 24 MMOL/L (ref 23–29)
CO2 SERPL-SCNC: 24 MMOL/L (ref 23–29)
CREAT SERPL-MCNC: 0.8 MG/DL (ref 0.5–1.4)
CREAT SERPL-MCNC: 0.8 MG/DL (ref 0.5–1.4)
DIFFERENTIAL METHOD: ABNORMAL
DIFFERENTIAL METHOD: ABNORMAL
EOSINOPHIL # BLD AUTO: 0 K/UL (ref 0–0.5)
EOSINOPHIL # BLD AUTO: 0 K/UL (ref 0–0.5)
EOSINOPHIL NFR BLD: 0 % (ref 0–8)
EOSINOPHIL NFR BLD: 0 % (ref 0–8)
ERYTHROCYTE [DISTWIDTH] IN BLOOD BY AUTOMATED COUNT: 11.7 % (ref 11.5–14.5)
ERYTHROCYTE [DISTWIDTH] IN BLOOD BY AUTOMATED COUNT: 11.7 % (ref 11.5–14.5)
EST. GFR  (AFRICAN AMERICAN): >60 ML/MIN/1.73 M^2
EST. GFR  (AFRICAN AMERICAN): >60 ML/MIN/1.73 M^2
EST. GFR  (NON AFRICAN AMERICAN): >60 ML/MIN/1.73 M^2
EST. GFR  (NON AFRICAN AMERICAN): >60 ML/MIN/1.73 M^2
GLUCOSE SERPL-MCNC: 233 MG/DL (ref 70–110)
GLUCOSE SERPL-MCNC: 233 MG/DL (ref 70–110)
HCT VFR BLD AUTO: 45.3 % (ref 40–54)
HCT VFR BLD AUTO: 45.3 % (ref 40–54)
HGB BLD-MCNC: 15.5 G/DL (ref 14–18)
HGB BLD-MCNC: 15.5 G/DL (ref 14–18)
IMM GRANULOCYTES # BLD AUTO: 0.05 K/UL (ref 0–0.04)
IMM GRANULOCYTES # BLD AUTO: 0.05 K/UL (ref 0–0.04)
IMM GRANULOCYTES NFR BLD AUTO: 0.8 % (ref 0–0.5)
IMM GRANULOCYTES NFR BLD AUTO: 0.8 % (ref 0–0.5)
LYMPHOCYTES # BLD AUTO: 0.8 K/UL (ref 1–4.8)
LYMPHOCYTES # BLD AUTO: 0.8 K/UL (ref 1–4.8)
LYMPHOCYTES NFR BLD: 12.8 % (ref 18–48)
LYMPHOCYTES NFR BLD: 12.8 % (ref 18–48)
MAGNESIUM SERPL-MCNC: 2.2 MG/DL (ref 1.6–2.6)
MCH RBC QN AUTO: 31.2 PG (ref 27–31)
MCH RBC QN AUTO: 31.2 PG (ref 27–31)
MCHC RBC AUTO-ENTMCNC: 34.2 G/DL (ref 32–36)
MCHC RBC AUTO-ENTMCNC: 34.2 G/DL (ref 32–36)
MCV RBC AUTO: 91 FL (ref 82–98)
MCV RBC AUTO: 91 FL (ref 82–98)
MONOCYTES # BLD AUTO: 0.6 K/UL (ref 0.3–1)
MONOCYTES # BLD AUTO: 0.6 K/UL (ref 0.3–1)
MONOCYTES NFR BLD: 9.3 % (ref 4–15)
MONOCYTES NFR BLD: 9.3 % (ref 4–15)
NEUTROPHILS # BLD AUTO: 4.7 K/UL (ref 1.8–7.7)
NEUTROPHILS # BLD AUTO: 4.7 K/UL (ref 1.8–7.7)
NEUTROPHILS NFR BLD: 76.9 % (ref 38–73)
NEUTROPHILS NFR BLD: 76.9 % (ref 38–73)
NRBC BLD-RTO: 0 /100 WBC
NRBC BLD-RTO: 0 /100 WBC
PHOSPHATE SERPL-MCNC: 2.9 MG/DL (ref 2.7–4.5)
PLATELET # BLD AUTO: 164 K/UL (ref 150–450)
PLATELET # BLD AUTO: 164 K/UL (ref 150–450)
PMV BLD AUTO: 10.3 FL (ref 9.2–12.9)
PMV BLD AUTO: 10.3 FL (ref 9.2–12.9)
POCT GLUCOSE: 209 MG/DL (ref 70–110)
POCT GLUCOSE: 215 MG/DL (ref 70–110)
POCT GLUCOSE: 263 MG/DL (ref 70–110)
POCT GLUCOSE: 287 MG/DL (ref 70–110)
POTASSIUM SERPL-SCNC: 3.6 MMOL/L (ref 3.5–5.1)
POTASSIUM SERPL-SCNC: 3.6 MMOL/L (ref 3.5–5.1)
PROT SERPL-MCNC: 7.7 G/DL (ref 6–8.4)
PROT SERPL-MCNC: 7.7 G/DL (ref 6–8.4)
RBC # BLD AUTO: 4.97 M/UL (ref 4.6–6.2)
RBC # BLD AUTO: 4.97 M/UL (ref 4.6–6.2)
SODIUM SERPL-SCNC: 138 MMOL/L (ref 136–145)
SODIUM SERPL-SCNC: 138 MMOL/L (ref 136–145)
WBC # BLD AUTO: 6.15 K/UL (ref 3.9–12.7)
WBC # BLD AUTO: 6.15 K/UL (ref 3.9–12.7)

## 2021-06-12 PROCEDURE — 36415 COLL VENOUS BLD VENIPUNCTURE: CPT | Performed by: HOSPITALIST

## 2021-06-12 PROCEDURE — 80053 COMPREHEN METABOLIC PANEL: CPT | Performed by: HOSPITALIST

## 2021-06-12 PROCEDURE — 94640 AIRWAY INHALATION TREATMENT: CPT

## 2021-06-12 PROCEDURE — 85025 COMPLETE CBC W/AUTO DIFF WBC: CPT | Performed by: HOSPITALIST

## 2021-06-12 PROCEDURE — 94761 N-INVAS EAR/PLS OXIMETRY MLT: CPT

## 2021-06-12 PROCEDURE — 25000003 PHARM REV CODE 250: Performed by: FAMILY MEDICINE

## 2021-06-12 PROCEDURE — 27000221 HC OXYGEN, UP TO 24 HOURS

## 2021-06-12 PROCEDURE — 63600175 PHARM REV CODE 636 W HCPCS: Performed by: HOSPITALIST

## 2021-06-12 PROCEDURE — 21400001 HC TELEMETRY ROOM

## 2021-06-12 PROCEDURE — 83735 ASSAY OF MAGNESIUM: CPT | Performed by: HOSPITALIST

## 2021-06-12 PROCEDURE — 25000003 PHARM REV CODE 250: Performed by: HOSPITALIST

## 2021-06-12 PROCEDURE — 84100 ASSAY OF PHOSPHORUS: CPT | Performed by: HOSPITALIST

## 2021-06-12 PROCEDURE — 99232 SBSQ HOSP IP/OBS MODERATE 35: CPT | Mod: ,,, | Performed by: FAMILY MEDICINE

## 2021-06-12 PROCEDURE — 99232 PR SUBSEQUENT HOSPITAL CARE,LEVL II: ICD-10-PCS | Mod: ,,, | Performed by: FAMILY MEDICINE

## 2021-06-12 RX ADMIN — OXYCODONE HYDROCHLORIDE AND ACETAMINOPHEN 500 MG: 500 TABLET ORAL at 08:06

## 2021-06-12 RX ADMIN — GUAIFENESIN AND DEXTROMETHORPHAN 5 ML: 100; 10 SYRUP ORAL at 08:06

## 2021-06-12 RX ADMIN — MULTIPLE VITAMINS W/ MINERALS TAB 1 TABLET: TAB at 08:06

## 2021-06-12 RX ADMIN — ALBUTEROL SULFATE 2 PUFF: 90 AEROSOL, METERED RESPIRATORY (INHALATION) at 12:06

## 2021-06-12 RX ADMIN — ENOXAPARIN SODIUM 40 MG: 100 INJECTION SUBCUTANEOUS at 10:06

## 2021-06-12 RX ADMIN — ENOXAPARIN SODIUM 40 MG: 100 INJECTION SUBCUTANEOUS at 08:06

## 2021-06-12 RX ADMIN — DEXAMETHASONE 6 MG: 4 TABLET ORAL at 08:06

## 2021-06-12 RX ADMIN — ALBUTEROL SULFATE 2 PUFF: 90 AEROSOL, METERED RESPIRATORY (INHALATION) at 07:06

## 2021-06-12 RX ADMIN — REMDESIVIR 100 MG: 100 INJECTION, POWDER, LYOPHILIZED, FOR SOLUTION INTRAVENOUS at 05:06

## 2021-06-12 RX ADMIN — INSULIN ASPART 2 UNITS: 100 INJECTION, SOLUTION INTRAVENOUS; SUBCUTANEOUS at 12:06

## 2021-06-12 RX ADMIN — CHOLECALCIFEROL TAB 25 MCG (1000 UNIT) 1000 UNITS: 25 TAB at 08:06

## 2021-06-12 RX ADMIN — INSULIN ASPART 3 UNITS: 100 INJECTION, SOLUTION INTRAVENOUS; SUBCUTANEOUS at 05:06

## 2021-06-12 RX ADMIN — ATORVASTATIN CALCIUM 10 MG: 10 TABLET, FILM COATED ORAL at 08:06

## 2021-06-12 RX ADMIN — ALLOPURINOL 100 MG: 100 TABLET ORAL at 08:06

## 2021-06-12 RX ADMIN — PANTOPRAZOLE SODIUM 40 MG: 40 TABLET, DELAYED RELEASE ORAL at 08:06

## 2021-06-12 RX ADMIN — OXYCODONE HYDROCHLORIDE AND ACETAMINOPHEN 500 MG: 500 TABLET ORAL at 10:06

## 2021-06-12 RX ADMIN — GUAIFENESIN AND DEXTROMETHORPHAN 5 ML: 100; 10 SYRUP ORAL at 10:06

## 2021-06-12 RX ADMIN — METOPROLOL SUCCINATE 25 MG: 25 TABLET, EXTENDED RELEASE ORAL at 08:06

## 2021-06-12 RX ADMIN — INSULIN ASPART 1 UNITS: 100 INJECTION, SOLUTION INTRAVENOUS; SUBCUTANEOUS at 10:06

## 2021-06-12 RX ADMIN — INSULIN ASPART 2 UNITS: 100 INJECTION, SOLUTION INTRAVENOUS; SUBCUTANEOUS at 08:06

## 2021-06-12 RX ADMIN — GUAIFENESIN AND DEXTROMETHORPHAN 5 ML: 100; 10 SYRUP ORAL at 05:06

## 2021-06-13 VITALS
BODY MASS INDEX: 48.03 KG/M2 | TEMPERATURE: 99 F | SYSTOLIC BLOOD PRESSURE: 120 MMHG | HEART RATE: 72 BPM | RESPIRATION RATE: 18 BRPM | DIASTOLIC BLOOD PRESSURE: 76 MMHG | OXYGEN SATURATION: 94 % | WEIGHT: 306 LBS | HEIGHT: 67 IN

## 2021-06-13 DIAGNOSIS — U07.1 COVID-19 VIRUS DETECTED: ICD-10-CM

## 2021-06-13 LAB
ALBUMIN SERPL BCP-MCNC: 2.8 G/DL (ref 3.5–5.2)
ALBUMIN SERPL BCP-MCNC: 2.8 G/DL (ref 3.5–5.2)
ALP SERPL-CCNC: 74 U/L (ref 55–135)
ALP SERPL-CCNC: 74 U/L (ref 55–135)
ALT SERPL W/O P-5'-P-CCNC: 69 U/L (ref 10–44)
ALT SERPL W/O P-5'-P-CCNC: 69 U/L (ref 10–44)
ANION GAP SERPL CALC-SCNC: 10 MMOL/L (ref 8–16)
ANION GAP SERPL CALC-SCNC: 10 MMOL/L (ref 8–16)
AST SERPL-CCNC: 59 U/L (ref 10–40)
AST SERPL-CCNC: 59 U/L (ref 10–40)
BASOPHILS NFR BLD: 0 % (ref 0–1.9)
BASOPHILS NFR BLD: 0 % (ref 0–1.9)
BILIRUB SERPL-MCNC: 0.4 MG/DL (ref 0.1–1)
BILIRUB SERPL-MCNC: 0.4 MG/DL (ref 0.1–1)
BUN SERPL-MCNC: 25 MG/DL (ref 6–20)
BUN SERPL-MCNC: 25 MG/DL (ref 6–20)
CALCIUM SERPL-MCNC: 8.7 MG/DL (ref 8.7–10.5)
CALCIUM SERPL-MCNC: 8.7 MG/DL (ref 8.7–10.5)
CHLORIDE SERPL-SCNC: 105 MMOL/L (ref 95–110)
CHLORIDE SERPL-SCNC: 105 MMOL/L (ref 95–110)
CO2 SERPL-SCNC: 24 MMOL/L (ref 23–29)
CO2 SERPL-SCNC: 24 MMOL/L (ref 23–29)
CREAT SERPL-MCNC: 0.8 MG/DL (ref 0.5–1.4)
CREAT SERPL-MCNC: 0.8 MG/DL (ref 0.5–1.4)
CRP SERPL-MCNC: 31.8 MG/L (ref 0–8.2)
DIFFERENTIAL METHOD: ABNORMAL
DIFFERENTIAL METHOD: ABNORMAL
EOSINOPHIL NFR BLD: 0 % (ref 0–8)
EOSINOPHIL NFR BLD: 0 % (ref 0–8)
ERYTHROCYTE [DISTWIDTH] IN BLOOD BY AUTOMATED COUNT: 11.8 % (ref 11.5–14.5)
ERYTHROCYTE [DISTWIDTH] IN BLOOD BY AUTOMATED COUNT: 11.8 % (ref 11.5–14.5)
EST. GFR  (AFRICAN AMERICAN): >60 ML/MIN/1.73 M^2
EST. GFR  (AFRICAN AMERICAN): >60 ML/MIN/1.73 M^2
EST. GFR  (NON AFRICAN AMERICAN): >60 ML/MIN/1.73 M^2
EST. GFR  (NON AFRICAN AMERICAN): >60 ML/MIN/1.73 M^2
GLUCOSE SERPL-MCNC: 218 MG/DL (ref 70–110)
GLUCOSE SERPL-MCNC: 218 MG/DL (ref 70–110)
HCT VFR BLD AUTO: 42.5 % (ref 40–54)
HCT VFR BLD AUTO: 42.5 % (ref 40–54)
HGB BLD-MCNC: 14.4 G/DL (ref 14–18)
HGB BLD-MCNC: 14.4 G/DL (ref 14–18)
IMM GRANULOCYTES # BLD AUTO: ABNORMAL K/UL (ref 0–0.04)
IMM GRANULOCYTES # BLD AUTO: ABNORMAL K/UL (ref 0–0.04)
IMM GRANULOCYTES NFR BLD AUTO: ABNORMAL % (ref 0–0.5)
IMM GRANULOCYTES NFR BLD AUTO: ABNORMAL % (ref 0–0.5)
LDH SERPL L TO P-CCNC: 331 U/L (ref 110–260)
LYMPHOCYTES NFR BLD: 16 % (ref 18–48)
LYMPHOCYTES NFR BLD: 16 % (ref 18–48)
MAGNESIUM SERPL-MCNC: 2.2 MG/DL (ref 1.6–2.6)
MCH RBC QN AUTO: 31.2 PG (ref 27–31)
MCH RBC QN AUTO: 31.2 PG (ref 27–31)
MCHC RBC AUTO-ENTMCNC: 33.9 G/DL (ref 32–36)
MCHC RBC AUTO-ENTMCNC: 33.9 G/DL (ref 32–36)
MCV RBC AUTO: 92 FL (ref 82–98)
MCV RBC AUTO: 92 FL (ref 82–98)
MONOCYTES NFR BLD: 8 % (ref 4–15)
MONOCYTES NFR BLD: 8 % (ref 4–15)
NEUTROPHILS NFR BLD: 76 % (ref 38–73)
NEUTROPHILS NFR BLD: 76 % (ref 38–73)
NRBC BLD-RTO: 0 /100 WBC
NRBC BLD-RTO: 0 /100 WBC
PHOSPHATE SERPL-MCNC: 2.5 MG/DL (ref 2.7–4.5)
PLATELET # BLD AUTO: 180 K/UL (ref 150–450)
PLATELET # BLD AUTO: 180 K/UL (ref 150–450)
PMV BLD AUTO: 10.6 FL (ref 9.2–12.9)
PMV BLD AUTO: 10.6 FL (ref 9.2–12.9)
POCT GLUCOSE: 206 MG/DL (ref 70–110)
POCT GLUCOSE: 232 MG/DL (ref 70–110)
POTASSIUM SERPL-SCNC: 3.8 MMOL/L (ref 3.5–5.1)
POTASSIUM SERPL-SCNC: 3.8 MMOL/L (ref 3.5–5.1)
PROT SERPL-MCNC: 7 G/DL (ref 6–8.4)
PROT SERPL-MCNC: 7 G/DL (ref 6–8.4)
RBC # BLD AUTO: 4.61 M/UL (ref 4.6–6.2)
RBC # BLD AUTO: 4.61 M/UL (ref 4.6–6.2)
SODIUM SERPL-SCNC: 139 MMOL/L (ref 136–145)
SODIUM SERPL-SCNC: 139 MMOL/L (ref 136–145)
WBC # BLD AUTO: 8.04 K/UL (ref 3.9–12.7)
WBC # BLD AUTO: 8.04 K/UL (ref 3.9–12.7)

## 2021-06-13 PROCEDURE — 36415 COLL VENOUS BLD VENIPUNCTURE: CPT | Performed by: HOSPITALIST

## 2021-06-13 PROCEDURE — 63600175 PHARM REV CODE 636 W HCPCS: Performed by: HOSPITALIST

## 2021-06-13 PROCEDURE — 86140 C-REACTIVE PROTEIN: CPT | Performed by: HOSPITALIST

## 2021-06-13 PROCEDURE — 84100 ASSAY OF PHOSPHORUS: CPT | Performed by: HOSPITALIST

## 2021-06-13 PROCEDURE — 94761 N-INVAS EAR/PLS OXIMETRY MLT: CPT

## 2021-06-13 PROCEDURE — 25000003 PHARM REV CODE 250: Performed by: FAMILY MEDICINE

## 2021-06-13 PROCEDURE — 27000221 HC OXYGEN, UP TO 24 HOURS

## 2021-06-13 PROCEDURE — 99232 SBSQ HOSP IP/OBS MODERATE 35: CPT | Mod: ,,, | Performed by: FAMILY MEDICINE

## 2021-06-13 PROCEDURE — 85007 BL SMEAR W/DIFF WBC COUNT: CPT | Performed by: HOSPITALIST

## 2021-06-13 PROCEDURE — 25000003 PHARM REV CODE 250: Performed by: HOSPITALIST

## 2021-06-13 PROCEDURE — 80053 COMPREHEN METABOLIC PANEL: CPT | Performed by: HOSPITALIST

## 2021-06-13 PROCEDURE — 99232 PR SUBSEQUENT HOSPITAL CARE,LEVL II: ICD-10-PCS | Mod: ,,, | Performed by: FAMILY MEDICINE

## 2021-06-13 PROCEDURE — 94640 AIRWAY INHALATION TREATMENT: CPT

## 2021-06-13 PROCEDURE — 83735 ASSAY OF MAGNESIUM: CPT | Performed by: HOSPITALIST

## 2021-06-13 PROCEDURE — 82728 ASSAY OF FERRITIN: CPT | Performed by: HOSPITALIST

## 2021-06-13 PROCEDURE — 83615 LACTATE (LD) (LDH) ENZYME: CPT | Performed by: HOSPITALIST

## 2021-06-13 PROCEDURE — 85027 COMPLETE CBC AUTOMATED: CPT | Performed by: HOSPITALIST

## 2021-06-13 RX ORDER — LEVOFLOXACIN 750 MG/1
750 TABLET ORAL DAILY
Qty: 3 TABLET | Refills: 0 | Status: SHIPPED | OUTPATIENT
Start: 2021-06-14 | End: 2021-06-29

## 2021-06-13 RX ORDER — GUAIFENESIN/DEXTROMETHORPHAN 100-10MG/5
5 SYRUP ORAL EVERY 4 HOURS PRN
Qty: 236 ML | Refills: 0 | Status: SHIPPED | OUTPATIENT
Start: 2021-06-13 | End: 2021-06-23

## 2021-06-13 RX ORDER — ALBUTEROL SULFATE 90 UG/1
2 AEROSOL, METERED RESPIRATORY (INHALATION) EVERY 6 HOURS
Qty: 18 G | Refills: 0 | Status: SHIPPED | OUTPATIENT
Start: 2021-06-13 | End: 2021-08-10

## 2021-06-13 RX ORDER — LEVOFLOXACIN 250 MG/1
750 TABLET ORAL DAILY
Status: DISCONTINUED | OUTPATIENT
Start: 2021-06-13 | End: 2021-06-13 | Stop reason: HOSPADM

## 2021-06-13 RX ORDER — DEXAMETHASONE 6 MG/1
6 TABLET ORAL DAILY
Qty: 5 TABLET | Refills: 0 | Status: SHIPPED | OUTPATIENT
Start: 2021-06-14 | End: 2021-06-19

## 2021-06-13 RX ADMIN — OXYCODONE HYDROCHLORIDE AND ACETAMINOPHEN 500 MG: 500 TABLET ORAL at 08:06

## 2021-06-13 RX ADMIN — INSULIN ASPART 2 UNITS: 100 INJECTION, SOLUTION INTRAVENOUS; SUBCUTANEOUS at 08:06

## 2021-06-13 RX ADMIN — LEVOFLOXACIN 750 MG: 250 TABLET, FILM COATED ORAL at 11:06

## 2021-06-13 RX ADMIN — ATORVASTATIN CALCIUM 10 MG: 10 TABLET, FILM COATED ORAL at 08:06

## 2021-06-13 RX ADMIN — GUAIFENESIN AND DEXTROMETHORPHAN 5 ML: 100; 10 SYRUP ORAL at 08:06

## 2021-06-13 RX ADMIN — DEXAMETHASONE 6 MG: 4 TABLET ORAL at 08:06

## 2021-06-13 RX ADMIN — CHOLECALCIFEROL TAB 25 MCG (1000 UNIT) 1000 UNITS: 25 TAB at 08:06

## 2021-06-13 RX ADMIN — INSULIN ASPART 3 UNITS: 100 INJECTION, SOLUTION INTRAVENOUS; SUBCUTANEOUS at 12:06

## 2021-06-13 RX ADMIN — ALBUTEROL SULFATE 2 PUFF: 90 AEROSOL, METERED RESPIRATORY (INHALATION) at 12:06

## 2021-06-13 RX ADMIN — ALLOPURINOL 100 MG: 100 TABLET ORAL at 08:06

## 2021-06-13 RX ADMIN — METOPROLOL SUCCINATE 25 MG: 25 TABLET, EXTENDED RELEASE ORAL at 08:06

## 2021-06-13 RX ADMIN — PANTOPRAZOLE SODIUM 40 MG: 40 TABLET, DELAYED RELEASE ORAL at 08:06

## 2021-06-13 RX ADMIN — MULTIPLE VITAMINS W/ MINERALS TAB 1 TABLET: TAB at 08:06

## 2021-06-13 RX ADMIN — ENOXAPARIN SODIUM 40 MG: 100 INJECTION SUBCUTANEOUS at 08:06

## 2021-06-13 RX ADMIN — ALBUTEROL SULFATE 2 PUFF: 90 AEROSOL, METERED RESPIRATORY (INHALATION) at 06:06

## 2021-06-14 LAB — FERRITIN SERPL-MCNC: 542 NG/ML (ref 20–300)

## 2021-06-15 ENCOUNTER — TELEPHONE (OUTPATIENT)
Dept: FAMILY MEDICINE | Facility: CLINIC | Age: 51
End: 2021-06-15

## 2021-06-15 ENCOUNTER — PATIENT OUTREACH (OUTPATIENT)
Dept: INFECTIOUS DISEASES | Facility: HOSPITAL | Age: 51
End: 2021-06-15

## 2021-06-15 ENCOUNTER — PATIENT OUTREACH (OUTPATIENT)
Dept: ADMINISTRATIVE | Facility: CLINIC | Age: 51
End: 2021-06-15

## 2021-06-16 DIAGNOSIS — E11.9 TYPE 2 DIABETES MELLITUS WITHOUT COMPLICATION, UNSPECIFIED WHETHER LONG TERM INSULIN USE: ICD-10-CM

## 2021-06-21 ENCOUNTER — TELEPHONE (OUTPATIENT)
Dept: FAMILY MEDICINE | Facility: CLINIC | Age: 51
End: 2021-06-21

## 2021-06-21 DIAGNOSIS — E11.9 TYPE 2 DIABETES MELLITUS WITHOUT COMPLICATION, WITHOUT LONG-TERM CURRENT USE OF INSULIN: Primary | ICD-10-CM

## 2021-06-22 RX ORDER — BLOOD SUGAR DIAGNOSTIC
STRIP MISCELLANEOUS
Qty: 90 EACH | Refills: 3 | Status: SHIPPED | OUTPATIENT
Start: 2021-06-22 | End: 2022-09-06

## 2021-06-25 ENCOUNTER — OFFICE VISIT (OUTPATIENT)
Dept: GASTROENTEROLOGY | Facility: CLINIC | Age: 51
End: 2021-06-25
Payer: MEDICAID

## 2021-06-25 ENCOUNTER — LAB VISIT (OUTPATIENT)
Dept: LAB | Facility: HOSPITAL | Age: 51
End: 2021-06-25
Attending: INTERNAL MEDICINE
Payer: MEDICAID

## 2021-06-25 VITALS
HEART RATE: 88 BPM | DIASTOLIC BLOOD PRESSURE: 92 MMHG | OXYGEN SATURATION: 95 % | HEIGHT: 71 IN | WEIGHT: 291.88 LBS | SYSTOLIC BLOOD PRESSURE: 164 MMHG | BODY MASS INDEX: 40.86 KG/M2

## 2021-06-25 DIAGNOSIS — R19.7 DIARRHEA, UNSPECIFIED TYPE: ICD-10-CM

## 2021-06-25 DIAGNOSIS — K21.9 GASTROESOPHAGEAL REFLUX DISEASE, UNSPECIFIED WHETHER ESOPHAGITIS PRESENT: Primary | ICD-10-CM

## 2021-06-25 DIAGNOSIS — R19.7 DIARRHEA: ICD-10-CM

## 2021-06-25 DIAGNOSIS — R19.7 DIARRHEA, UNSPECIFIED TYPE: Primary | ICD-10-CM

## 2021-06-25 DIAGNOSIS — K21.9 GASTROESOPHAGEAL REFLUX DISEASE, UNSPECIFIED WHETHER ESOPHAGITIS PRESENT: ICD-10-CM

## 2021-06-25 LAB
ALBUMIN SERPL BCP-MCNC: 3.2 G/DL (ref 3.5–5.2)
ALP SERPL-CCNC: 128 U/L (ref 55–135)
ALT SERPL W/O P-5'-P-CCNC: 65 U/L (ref 10–44)
ANION GAP SERPL CALC-SCNC: 13 MMOL/L (ref 8–16)
AST SERPL-CCNC: 24 U/L (ref 10–40)
BASOPHILS # BLD AUTO: 0.04 K/UL (ref 0–0.2)
BASOPHILS NFR BLD: 0.4 % (ref 0–1.9)
BILIRUB SERPL-MCNC: 1 MG/DL (ref 0.1–1)
BUN SERPL-MCNC: 16 MG/DL (ref 6–20)
CALCIUM SERPL-MCNC: 9.8 MG/DL (ref 8.7–10.5)
CHLORIDE SERPL-SCNC: 101 MMOL/L (ref 95–110)
CO2 SERPL-SCNC: 23 MMOL/L (ref 23–29)
CREAT SERPL-MCNC: 1 MG/DL (ref 0.5–1.4)
DIFFERENTIAL METHOD: ABNORMAL
EOSINOPHIL # BLD AUTO: 0.1 K/UL (ref 0–0.5)
EOSINOPHIL NFR BLD: 0.9 % (ref 0–8)
ERYTHROCYTE [DISTWIDTH] IN BLOOD BY AUTOMATED COUNT: 11.9 % (ref 11.5–14.5)
EST. GFR  (AFRICAN AMERICAN): >60 ML/MIN/1.73 M^2
EST. GFR  (NON AFRICAN AMERICAN): >60 ML/MIN/1.73 M^2
GLUCOSE SERPL-MCNC: 215 MG/DL (ref 70–110)
HCT VFR BLD AUTO: 48.6 % (ref 40–54)
HGB BLD-MCNC: 15.8 G/DL (ref 14–18)
IMM GRANULOCYTES # BLD AUTO: 0.03 K/UL (ref 0–0.04)
IMM GRANULOCYTES NFR BLD AUTO: 0.3 % (ref 0–0.5)
LYMPHOCYTES # BLD AUTO: 3.1 K/UL (ref 1–4.8)
LYMPHOCYTES NFR BLD: 26.8 % (ref 18–48)
MCH RBC QN AUTO: 30.9 PG (ref 27–31)
MCHC RBC AUTO-ENTMCNC: 32.5 G/DL (ref 32–36)
MCV RBC AUTO: 95 FL (ref 82–98)
MONOCYTES # BLD AUTO: 1.1 K/UL (ref 0.3–1)
MONOCYTES NFR BLD: 9.5 % (ref 4–15)
NEUTROPHILS # BLD AUTO: 7.1 K/UL (ref 1.8–7.7)
NEUTROPHILS NFR BLD: 62.1 % (ref 38–73)
NRBC BLD-RTO: 0 /100 WBC
PLATELET # BLD AUTO: 191 K/UL (ref 150–450)
PMV BLD AUTO: 10.5 FL (ref 9.2–12.9)
POTASSIUM SERPL-SCNC: 4.4 MMOL/L (ref 3.5–5.1)
PROT SERPL-MCNC: 7.9 G/DL (ref 6–8.4)
RBC # BLD AUTO: 5.12 M/UL (ref 4.6–6.2)
SODIUM SERPL-SCNC: 137 MMOL/L (ref 136–145)
WBC # BLD AUTO: 11.39 K/UL (ref 3.9–12.7)

## 2021-06-25 PROCEDURE — 85025 COMPLETE CBC W/AUTO DIFF WBC: CPT | Performed by: INTERNAL MEDICINE

## 2021-06-25 PROCEDURE — 99214 OFFICE O/P EST MOD 30 MIN: CPT | Mod: PBBFAC,PN | Performed by: INTERNAL MEDICINE

## 2021-06-25 PROCEDURE — 80053 COMPREHEN METABOLIC PANEL: CPT | Performed by: INTERNAL MEDICINE

## 2021-06-25 PROCEDURE — 99214 PR OFFICE/OUTPT VISIT, EST, LEVL IV, 30-39 MIN: ICD-10-PCS | Mod: S$PBB,,, | Performed by: INTERNAL MEDICINE

## 2021-06-25 PROCEDURE — 36415 COLL VENOUS BLD VENIPUNCTURE: CPT | Performed by: INTERNAL MEDICINE

## 2021-06-25 PROCEDURE — 99999 PR PBB SHADOW E&M-EST. PATIENT-LVL IV: ICD-10-PCS | Mod: PBBFAC,,, | Performed by: INTERNAL MEDICINE

## 2021-06-25 PROCEDURE — 99999 PR PBB SHADOW E&M-EST. PATIENT-LVL IV: CPT | Mod: PBBFAC,,, | Performed by: INTERNAL MEDICINE

## 2021-06-25 PROCEDURE — 99214 OFFICE O/P EST MOD 30 MIN: CPT | Mod: S$PBB,,, | Performed by: INTERNAL MEDICINE

## 2021-06-25 RX ORDER — GUAIFENESIN/DEXTROMETHORPHAN 100-10MG/5
5 LIQUID (ML) ORAL EVERY 4 HOURS PRN
COMMUNITY
Start: 2021-06-14 | End: 2021-06-29 | Stop reason: SDUPTHER

## 2021-06-25 RX ORDER — SODIUM CHLORIDE 0.9 % (FLUSH) 0.9 %
10 SYRINGE (ML) INJECTION
Status: CANCELLED | OUTPATIENT
Start: 2021-06-25

## 2021-06-28 PROBLEM — R19.7 DIARRHEA: Status: ACTIVE | Noted: 2021-06-28

## 2021-06-28 RX ORDER — SODIUM, POTASSIUM,MAG SULFATES 17.5-3.13G
1 SOLUTION, RECONSTITUTED, ORAL ORAL DAILY
Qty: 1 KIT | Refills: 0 | Status: SHIPPED | OUTPATIENT
Start: 2021-06-28 | End: 2021-06-30

## 2021-06-29 ENCOUNTER — CLINICAL SUPPORT (OUTPATIENT)
Dept: FAMILY MEDICINE | Facility: CLINIC | Age: 51
End: 2021-06-29
Attending: FAMILY MEDICINE
Payer: MEDICAID

## 2021-06-29 ENCOUNTER — OFFICE VISIT (OUTPATIENT)
Dept: FAMILY MEDICINE | Facility: CLINIC | Age: 51
End: 2021-06-29
Payer: MEDICAID

## 2021-06-29 VITALS
OXYGEN SATURATION: 97 % | SYSTOLIC BLOOD PRESSURE: 132 MMHG | HEART RATE: 104 BPM | TEMPERATURE: 98 F | RESPIRATION RATE: 16 BRPM | BODY MASS INDEX: 41.68 KG/M2 | HEIGHT: 71 IN | DIASTOLIC BLOOD PRESSURE: 84 MMHG | WEIGHT: 297.69 LBS

## 2021-06-29 DIAGNOSIS — Z09 HOSPITAL DISCHARGE FOLLOW-UP: Primary | ICD-10-CM

## 2021-06-29 DIAGNOSIS — E11.9 TYPE 2 DIABETES MELLITUS WITHOUT COMPLICATION, UNSPECIFIED WHETHER LONG TERM INSULIN USE: ICD-10-CM

## 2021-06-29 PROCEDURE — 99213 PR OFFICE/OUTPT VISIT, EST, LEVL III, 20-29 MIN: ICD-10-PCS | Mod: S$GLB,,, | Performed by: FAMILY MEDICINE

## 2021-06-29 PROCEDURE — 99499 UNLISTED E&M SERVICE: CPT | Mod: S$GLB,,, | Performed by: OPHTHALMOLOGY

## 2021-06-29 PROCEDURE — 92228 IMG RTA DETC/MNTR DS PHY/QHP: CPT | Mod: TC,S$GLB,, | Performed by: FAMILY MEDICINE

## 2021-06-29 PROCEDURE — 99213 OFFICE O/P EST LOW 20 MIN: CPT | Mod: S$GLB,,, | Performed by: FAMILY MEDICINE

## 2021-06-29 PROCEDURE — 92228 DIABETIC EYE SCREENING PHOTO: ICD-10-PCS | Mod: TC,S$GLB,, | Performed by: FAMILY MEDICINE

## 2021-06-29 PROCEDURE — 99499 DIABETIC EYE SCREENING PHOTO: ICD-10-PCS | Mod: S$GLB,,, | Performed by: OPHTHALMOLOGY

## 2021-06-29 RX ORDER — GUAIFENESIN/DEXTROMETHORPHAN 100-10MG/5
5 LIQUID (ML) ORAL EVERY 4 HOURS PRN
Qty: 236 ML | Refills: 0 | Status: SHIPPED | OUTPATIENT
Start: 2021-06-29 | End: 2021-08-10

## 2021-06-30 ENCOUNTER — HOSPITAL ENCOUNTER (OUTPATIENT)
Dept: CARDIOLOGY | Facility: HOSPITAL | Age: 51
Discharge: HOME | End: 2021-06-30
Attending: INTERNAL MEDICINE
Payer: MEDICAID

## 2021-06-30 VITALS
BODY MASS INDEX: 41.58 KG/M2 | WEIGHT: 297 LBS | DIASTOLIC BLOOD PRESSURE: 65 MMHG | HEART RATE: 96 BPM | HEIGHT: 71 IN | SYSTOLIC BLOOD PRESSURE: 111 MMHG

## 2021-06-30 DIAGNOSIS — R07.2 PRECORDIAL PAIN: ICD-10-CM

## 2021-06-30 DIAGNOSIS — Z82.49 FAMILY HISTORY OF PREMATURE CAD: ICD-10-CM

## 2021-06-30 DIAGNOSIS — E11.9 TYPE 2 DIABETES MELLITUS WITHOUT COMPLICATION, WITHOUT LONG-TERM CURRENT USE OF INSULIN: ICD-10-CM

## 2021-06-30 DIAGNOSIS — Z91.89 CARDIOVASCULAR EVENT RISK: ICD-10-CM

## 2021-06-30 LAB
AORTIC ROOT ANNULUS: 3.61 CM
AORTIC VALVE CUSP SEPERATION: 2.39 CM
BSA FOR ECHO PROCEDURE: 2.6 M2
CV ECHO LV RWT: 0.5 CM
CV STRESS BASE HR: 83 BPM
DIASTOLIC BLOOD PRESSURE: 79 MMHG
DOP CALC LVOT AREA: 3.8 CM2
DOP CALC LVOT DIAMETER: 2.19 CM
ECHO LV POSTERIOR WALL: 1.24 CM (ref 0.6–1.1)
EJECTION FRACTION: 55 %
FRACTIONAL SHORTENING: 26 % (ref 28–44)
INTERVENTRICULAR SEPTUM: 1.22 CM (ref 0.6–1.1)
LA MAJOR: 3.79 CM
LA MINOR: 2.7 CM
LEFT ATRIUM SIZE: 4.16 CM
LEFT ATRIUM VOLUME INDEX MOD: 5 ML/M2
LEFT ATRIUM VOLUME MOD: 12.49 CM3
LEFT INTERNAL DIMENSION IN SYSTOLE: 3.7 CM (ref 2.1–4)
LEFT VENTRICLE DIASTOLIC VOLUME INDEX: 47.71 ML/M2
LEFT VENTRICLE DIASTOLIC VOLUME: 118.79 ML
LEFT VENTRICLE MASS INDEX: 98 G/M2
LEFT VENTRICLE SYSTOLIC VOLUME INDEX: 23.3 ML/M2
LEFT VENTRICLE SYSTOLIC VOLUME: 57.99 ML
LEFT VENTRICULAR INTERNAL DIMENSION IN DIASTOLE: 5.01 CM (ref 3.5–6)
LEFT VENTRICULAR MASS: 242.78 G
OHS CV CPX 85 PERCENT MAX PREDICTED HEART RATE MALE: 142
OHS CV CPX MAX PREDICTED HEART RATE: 167
OHS CV CPX PATIENT IS FEMALE: 0
OHS CV CPX PATIENT IS MALE: 1
OHS CV CPX PEAK DIASTOLIC BLOOD PRESSURE: 87 MMHG
OHS CV CPX PEAK HEAR RATE: 150 BPM
OHS CV CPX PEAK RATE PRESSURE PRODUCT: ABNORMAL
OHS CV CPX PEAK SYSTOLIC BLOOD PRESSURE: 206 MMHG
OHS CV CPX PERCENT MAX PREDICTED HEART RATE ACHIEVED: 90
OHS CV CPX RATE PRESSURE PRODUCT PRESENTING: ABNORMAL
RA MAJOR: 3.75 CM
RA WIDTH: 2.9 CM
RIGHT VENTRICULAR END-DIASTOLIC DIMENSION: 3.69 CM
SYSTOLIC BLOOD PRESSURE: 126 MMHG

## 2021-06-30 PROCEDURE — 93351 STRESS TTE COMPLETE: CPT

## 2021-06-30 PROCEDURE — 93352 STRESS ECHO (CUPID ONLY): ICD-10-PCS | Mod: ,,, | Performed by: INTERNAL MEDICINE

## 2021-06-30 PROCEDURE — 93352 ADMIN ECG CONTRAST AGENT: CPT | Mod: ,,, | Performed by: INTERNAL MEDICINE

## 2021-06-30 PROCEDURE — 93351 STRESS TTE COMPLETE: CPT | Mod: 26,,, | Performed by: INTERNAL MEDICINE

## 2021-06-30 PROCEDURE — 93351 STRESS ECHO (CUPID ONLY): ICD-10-PCS | Mod: 26,,, | Performed by: INTERNAL MEDICINE

## 2021-06-30 PROCEDURE — 25500020 PHARM REV CODE 255: Performed by: INTERNAL MEDICINE

## 2021-06-30 PROCEDURE — 63600175 PHARM REV CODE 636 W HCPCS: Performed by: INTERNAL MEDICINE

## 2021-06-30 RX ADMIN — DOBUTAMINE HYDROCHLORIDE: 400 INJECTION INTRAVENOUS at 09:06

## 2021-06-30 RX ADMIN — SULFUR HEXAFLUORIDE 5 ML: KIT at 05:06

## 2021-06-30 RX ADMIN — SULFUR HEXAFLUORIDE 2.4 ML: KIT at 09:06

## 2021-07-08 DIAGNOSIS — Z11.59 NEED FOR HEPATITIS C SCREENING TEST: ICD-10-CM

## 2021-07-15 ENCOUNTER — PATIENT MESSAGE (OUTPATIENT)
Dept: SURGERY | Facility: HOSPITAL | Age: 51
End: 2021-07-15

## 2021-07-19 ENCOUNTER — TELEPHONE (OUTPATIENT)
Dept: GASTROENTEROLOGY | Facility: CLINIC | Age: 51
End: 2021-07-19

## 2021-07-20 ENCOUNTER — PATIENT OUTREACH (OUTPATIENT)
Dept: ADMINISTRATIVE | Facility: OTHER | Age: 51
End: 2021-07-20

## 2021-07-23 ENCOUNTER — OFFICE VISIT (OUTPATIENT)
Dept: CARDIOLOGY | Facility: CLINIC | Age: 51
End: 2021-07-23
Payer: MEDICAID

## 2021-07-23 VITALS
HEART RATE: 69 BPM | BODY MASS INDEX: 42.52 KG/M2 | SYSTOLIC BLOOD PRESSURE: 144 MMHG | OXYGEN SATURATION: 96 % | RESPIRATION RATE: 19 BRPM | WEIGHT: 303.69 LBS | HEIGHT: 71 IN | DIASTOLIC BLOOD PRESSURE: 92 MMHG

## 2021-07-23 DIAGNOSIS — Z91.89 SEDENTARY LIFESTYLE: ICD-10-CM

## 2021-07-23 DIAGNOSIS — Z01.810 PREOP CARDIOVASCULAR EXAM: Primary | ICD-10-CM

## 2021-07-23 DIAGNOSIS — R07.2 PRECORDIAL PAIN: ICD-10-CM

## 2021-07-23 DIAGNOSIS — U07.1 COVID-19: ICD-10-CM

## 2021-07-23 DIAGNOSIS — R74.01 ELEVATED ALT MEASUREMENT: ICD-10-CM

## 2021-07-23 DIAGNOSIS — Z55.0 LITERACY LEVEL OF ILLITERATE: ICD-10-CM

## 2021-07-23 DIAGNOSIS — Z91.89 CARDIOVASCULAR EVENT RISK: ICD-10-CM

## 2021-07-23 DIAGNOSIS — E66.01 MORBID OBESITY: ICD-10-CM

## 2021-07-23 DIAGNOSIS — E88.09 HYPOALBUMINEMIA: ICD-10-CM

## 2021-07-23 PROCEDURE — 99214 OFFICE O/P EST MOD 30 MIN: CPT | Mod: S$PBB,25,, | Performed by: INTERNAL MEDICINE

## 2021-07-23 PROCEDURE — 99213 OFFICE O/P EST LOW 20 MIN: CPT | Mod: PBBFAC | Performed by: INTERNAL MEDICINE

## 2021-07-23 PROCEDURE — 99999 PR PBB SHADOW E&M-EST. PATIENT-LVL III: CPT | Mod: PBBFAC,,, | Performed by: INTERNAL MEDICINE

## 2021-07-23 PROCEDURE — 93010 EKG 12-LEAD: ICD-10-PCS | Mod: ,,, | Performed by: INTERNAL MEDICINE

## 2021-07-23 PROCEDURE — 99214 PR OFFICE/OUTPT VISIT, EST, LEVL IV, 30-39 MIN: ICD-10-PCS | Mod: S$PBB,25,, | Performed by: INTERNAL MEDICINE

## 2021-07-23 PROCEDURE — 93010 ELECTROCARDIOGRAM REPORT: CPT | Mod: ,,, | Performed by: INTERNAL MEDICINE

## 2021-07-23 PROCEDURE — 99999 PR PBB SHADOW E&M-EST. PATIENT-LVL III: ICD-10-PCS | Mod: PBBFAC,,, | Performed by: INTERNAL MEDICINE

## 2021-07-23 RX ORDER — DICYCLOMINE HYDROCHLORIDE 10 MG/1
CAPSULE ORAL
COMMUNITY
Start: 2021-07-06 | End: 2021-08-10 | Stop reason: SDUPTHER

## 2021-07-23 SDOH — SOCIAL DETERMINANTS OF HEALTH (SDOH): ILITERACY AND LOW LEVEL LITERACY: Z55.0

## 2021-07-27 ENCOUNTER — PATIENT MESSAGE (OUTPATIENT)
Dept: ADMINISTRATIVE | Facility: HOSPITAL | Age: 51
End: 2021-07-27

## 2021-07-27 ENCOUNTER — PATIENT OUTREACH (OUTPATIENT)
Dept: ADMINISTRATIVE | Facility: HOSPITAL | Age: 51
End: 2021-07-27

## 2021-07-28 ENCOUNTER — ANESTHESIA (OUTPATIENT)
Dept: SURGERY | Facility: HOSPITAL | Age: 51
End: 2021-07-28
Payer: MEDICAID

## 2021-07-28 ENCOUNTER — HOSPITAL ENCOUNTER (OUTPATIENT)
Facility: HOSPITAL | Age: 51
Discharge: HOME | End: 2021-07-28
Attending: INTERNAL MEDICINE | Admitting: INTERNAL MEDICINE
Payer: MEDICAID

## 2021-07-28 ENCOUNTER — ANESTHESIA EVENT (OUTPATIENT)
Dept: SURGERY | Facility: HOSPITAL | Age: 51
End: 2021-07-28
Payer: MEDICAID

## 2021-07-28 DIAGNOSIS — R19.7 DIARRHEA: ICD-10-CM

## 2021-07-28 DIAGNOSIS — R19.7 DIARRHEA, UNSPECIFIED TYPE: ICD-10-CM

## 2021-07-28 LAB — POCT GLUCOSE: 165 MG/DL (ref 70–110)

## 2021-07-28 PROCEDURE — D9220A PRA ANESTHESIA: Mod: ,,, | Performed by: NURSE ANESTHETIST, CERTIFIED REGISTERED

## 2021-07-28 PROCEDURE — 25000003 PHARM REV CODE 250

## 2021-07-28 PROCEDURE — D9220A PRA ANESTHESIA: ICD-10-PCS | Mod: ,,, | Performed by: NURSE ANESTHETIST, CERTIFIED REGISTERED

## 2021-07-28 PROCEDURE — 88342 CHG IMMUNOCYTOCHEMISTRY: ICD-10-PCS | Mod: 26,,, | Performed by: PATHOLOGY

## 2021-07-28 PROCEDURE — 43239 PR EGD, FLEX, W/BIOPSY, SGL/MULTI: ICD-10-PCS | Mod: 51,,, | Performed by: INTERNAL MEDICINE

## 2021-07-28 PROCEDURE — 37000009 HC ANESTHESIA EA ADD 15 MINS: Performed by: INTERNAL MEDICINE

## 2021-07-28 PROCEDURE — 43239 EGD BIOPSY SINGLE/MULTIPLE: CPT | Mod: 51,,, | Performed by: INTERNAL MEDICINE

## 2021-07-28 PROCEDURE — 00813 ANES UPR LWR GI NDSC PX: CPT | Performed by: INTERNAL MEDICINE

## 2021-07-28 PROCEDURE — 27201423 OPTIME MED/SURG SUP & DEVICES STERILE SUPPLY: Performed by: INTERNAL MEDICINE

## 2021-07-28 PROCEDURE — D9220A PRA ANESTHESIA: ICD-10-PCS | Mod: ,,, | Performed by: ANESTHESIOLOGY

## 2021-07-28 PROCEDURE — 43239 EGD BIOPSY SINGLE/MULTIPLE: CPT | Performed by: INTERNAL MEDICINE

## 2021-07-28 PROCEDURE — D9220A PRA ANESTHESIA: Mod: ,,, | Performed by: ANESTHESIOLOGY

## 2021-07-28 PROCEDURE — 45378 DIAGNOSTIC COLONOSCOPY: CPT | Mod: ,,, | Performed by: INTERNAL MEDICINE

## 2021-07-28 PROCEDURE — 88342 IMHCHEM/IMCYTCHM 1ST ANTB: CPT | Mod: 26,,, | Performed by: PATHOLOGY

## 2021-07-28 PROCEDURE — 88305 TISSUE EXAM BY PATHOLOGIST: CPT | Mod: 26,,, | Performed by: PATHOLOGY

## 2021-07-28 PROCEDURE — 25000003 PHARM REV CODE 250: Performed by: NURSE ANESTHETIST, CERTIFIED REGISTERED

## 2021-07-28 PROCEDURE — 88342 IMHCHEM/IMCYTCHM 1ST ANTB: CPT | Performed by: PATHOLOGY

## 2021-07-28 PROCEDURE — 87507 IADNA-DNA/RNA PROBE TQ 12-25: CPT | Performed by: INTERNAL MEDICINE

## 2021-07-28 PROCEDURE — 88305 TISSUE EXAM BY PATHOLOGIST: CPT | Performed by: PATHOLOGY

## 2021-07-28 PROCEDURE — 45378 DIAGNOSTIC COLONOSCOPY: CPT | Performed by: INTERNAL MEDICINE

## 2021-07-28 PROCEDURE — 45378 PR COLONOSCOPY,DIAGNOSTIC: ICD-10-PCS | Mod: ,,, | Performed by: INTERNAL MEDICINE

## 2021-07-28 PROCEDURE — 63600175 PHARM REV CODE 636 W HCPCS: Performed by: NURSE ANESTHETIST, CERTIFIED REGISTERED

## 2021-07-28 PROCEDURE — 37000008 HC ANESTHESIA 1ST 15 MINUTES: Performed by: INTERNAL MEDICINE

## 2021-07-28 PROCEDURE — 88305 TISSUE EXAM BY PATHOLOGIST: ICD-10-PCS | Mod: 26,,, | Performed by: PATHOLOGY

## 2021-07-28 RX ORDER — ONDANSETRON 2 MG/ML
4 INJECTION INTRAMUSCULAR; INTRAVENOUS DAILY PRN
Status: DISCONTINUED | OUTPATIENT
Start: 2021-07-28 | End: 2021-07-28 | Stop reason: HOSPADM

## 2021-07-28 RX ORDER — FAMOTIDINE 10 MG/ML
20 INJECTION INTRAVENOUS ONCE
Status: CANCELLED | OUTPATIENT
Start: 2021-07-28 | End: 2021-07-28

## 2021-07-28 RX ORDER — LIDOCAINE HYDROCHLORIDE 10 MG/ML
1 INJECTION, SOLUTION EPIDURAL; INFILTRATION; INTRACAUDAL; PERINEURAL ONCE
Status: CANCELLED | OUTPATIENT
Start: 2021-07-28 | End: 2021-07-28

## 2021-07-28 RX ORDER — FAMOTIDINE 10 MG/ML
INJECTION INTRAVENOUS
Status: COMPLETED
Start: 2021-07-28 | End: 2021-07-28

## 2021-07-28 RX ORDER — SODIUM CHLORIDE, SODIUM LACTATE, POTASSIUM CHLORIDE, CALCIUM CHLORIDE 600; 310; 30; 20 MG/100ML; MG/100ML; MG/100ML; MG/100ML
INJECTION, SOLUTION INTRAVENOUS CONTINUOUS
Status: CANCELLED | OUTPATIENT
Start: 2021-07-28

## 2021-07-28 RX ORDER — DIPHENHYDRAMINE HYDROCHLORIDE 50 MG/ML
12.5 INJECTION INTRAMUSCULAR; INTRAVENOUS
Status: DISCONTINUED | OUTPATIENT
Start: 2021-07-28 | End: 2021-07-28 | Stop reason: HOSPADM

## 2021-07-28 RX ORDER — PROPOFOL 10 MG/ML
VIAL (ML) INTRAVENOUS
Status: DISCONTINUED | OUTPATIENT
Start: 2021-07-28 | End: 2021-07-28

## 2021-07-28 RX ORDER — SODIUM CHLORIDE 0.9 % (FLUSH) 0.9 %
10 SYRINGE (ML) INJECTION
Status: DISCONTINUED | OUTPATIENT
Start: 2021-07-28 | End: 2021-07-28 | Stop reason: HOSPADM

## 2021-07-28 RX ORDER — SODIUM CHLORIDE, SODIUM LACTATE, POTASSIUM CHLORIDE, CALCIUM CHLORIDE 600; 310; 30; 20 MG/100ML; MG/100ML; MG/100ML; MG/100ML
125 INJECTION, SOLUTION INTRAVENOUS CONTINUOUS
Status: DISCONTINUED | OUTPATIENT
Start: 2021-07-28 | End: 2021-07-28 | Stop reason: HOSPADM

## 2021-07-28 RX ORDER — LIDOCAINE HYDROCHLORIDE 20 MG/ML
INJECTION, SOLUTION EPIDURAL; INFILTRATION; INTRACAUDAL; PERINEURAL
Status: DISCONTINUED | OUTPATIENT
Start: 2021-07-28 | End: 2021-07-28

## 2021-07-28 RX ADMIN — LIDOCAINE HYDROCHLORIDE 60 MG: 20 INJECTION, SOLUTION EPIDURAL; INFILTRATION; INTRACAUDAL; PERINEURAL at 07:07

## 2021-07-28 RX ADMIN — PROPOFOL 30 MG: 10 INJECTION, EMULSION INTRAVENOUS at 07:07

## 2021-07-28 RX ADMIN — PROPOFOL 40 MG: 10 INJECTION, EMULSION INTRAVENOUS at 07:07

## 2021-07-28 RX ADMIN — FAMOTIDINE 20 MG: 10 INJECTION INTRAVENOUS at 06:07

## 2021-07-28 RX ADMIN — SODIUM CHLORIDE, POTASSIUM CHLORIDE, SODIUM LACTATE AND CALCIUM CHLORIDE: 600; 310; 30; 20 INJECTION, SOLUTION INTRAVENOUS at 07:07

## 2021-07-28 RX ADMIN — PROPOFOL 20 MG: 10 INJECTION, EMULSION INTRAVENOUS at 07:07

## 2021-07-30 ENCOUNTER — PROCEDURE VISIT (OUTPATIENT)
Dept: SLEEP MEDICINE | Facility: HOSPITAL | Age: 51
End: 2021-07-30
Attending: FAMILY MEDICINE
Payer: MEDICAID

## 2021-07-30 VITALS
RESPIRATION RATE: 15 BRPM | OXYGEN SATURATION: 98 % | DIASTOLIC BLOOD PRESSURE: 73 MMHG | TEMPERATURE: 98 F | SYSTOLIC BLOOD PRESSURE: 127 MMHG | BODY MASS INDEX: 40.74 KG/M2 | WEIGHT: 291 LBS | HEIGHT: 71 IN | HEART RATE: 61 BPM

## 2021-07-30 DIAGNOSIS — G47.19 EXCESSIVE DAYTIME SLEEPINESS: ICD-10-CM

## 2021-07-30 DIAGNOSIS — Z82.0 FAMILY HISTORY OF SLEEP APNEA: ICD-10-CM

## 2021-07-30 PROCEDURE — 95810 POLYSOM 6/> YRS 4/> PARAM: CPT

## 2021-08-02 LAB
COMMENT: NORMAL
FINAL PATHOLOGIC DIAGNOSIS: NORMAL
GROSS: NORMAL
Lab: NORMAL

## 2021-08-03 ENCOUNTER — HOSPITAL ENCOUNTER (OUTPATIENT)
Dept: RADIOLOGY | Facility: HOSPITAL | Age: 51
Discharge: HOME | End: 2021-08-03
Attending: INTERNAL MEDICINE
Payer: MEDICAID

## 2021-08-03 DIAGNOSIS — E66.01 MORBID OBESITY: ICD-10-CM

## 2021-08-03 DIAGNOSIS — R74.01 ELEVATED ALT MEASUREMENT: ICD-10-CM

## 2021-08-03 PROBLEM — K76.0 NAFLD (NONALCOHOLIC FATTY LIVER DISEASE): Status: ACTIVE | Noted: 2021-08-03

## 2021-08-03 PROCEDURE — 76705 US ABDOMEN LIMITED: ICD-10-PCS | Mod: 26,,, | Performed by: RADIOLOGY

## 2021-08-03 PROCEDURE — 76705 ECHO EXAM OF ABDOMEN: CPT | Mod: TC

## 2021-08-03 PROCEDURE — 76705 ECHO EXAM OF ABDOMEN: CPT | Mod: 26,,, | Performed by: RADIOLOGY

## 2021-08-04 LAB
GPP - ADENOVIRUS 40/41: NOT DETECTED
GPP - CAMPYLOBACTER: NOT DETECTED
GPP - CRYPTOSPORIDIUM: NOT DETECTED
GPP - E COLI O157: NOT DETECTED
GPP - ENTAMOEBA HISTOLYTICA: NOT DETECTED
GPP - ENTEROTOXIGENIC E COLI (ETEC): NOT DETECTED
GPP - GIARDIA LAMBLIA: NOT DETECTED
GPP - NOROVIRUS GI/GII: NOT DETECTED
GPP - ROTAVIRUS A: NOT DETECTED
GPP - SALMONELLA: NOT DETECTED
GPP - SHIGELLA: NOT DETECTED
GPP - VIBRIO CHOLERA: NOT DETECTED
GPP - YERSINIA ENTEROCOLITICA: NOT DETECTED
LACTATE PLASV-SCNC: NOT DETECTED MMOL/L

## 2021-08-10 ENCOUNTER — OFFICE VISIT (OUTPATIENT)
Dept: FAMILY MEDICINE | Facility: CLINIC | Age: 51
End: 2021-08-10
Payer: MEDICAID

## 2021-08-10 ENCOUNTER — OFFICE VISIT (OUTPATIENT)
Dept: GASTROENTEROLOGY | Facility: CLINIC | Age: 51
End: 2021-08-10
Payer: MEDICAID

## 2021-08-10 VITALS
HEART RATE: 64 BPM | BODY MASS INDEX: 42.4 KG/M2 | OXYGEN SATURATION: 97 % | SYSTOLIC BLOOD PRESSURE: 134 MMHG | DIASTOLIC BLOOD PRESSURE: 84 MMHG | WEIGHT: 302.88 LBS | HEIGHT: 71 IN | RESPIRATION RATE: 15 BRPM

## 2021-08-10 VITALS
OXYGEN SATURATION: 98 % | TEMPERATURE: 98 F | HEART RATE: 84 BPM | WEIGHT: 303 LBS | DIASTOLIC BLOOD PRESSURE: 84 MMHG | HEIGHT: 71 IN | SYSTOLIC BLOOD PRESSURE: 134 MMHG | RESPIRATION RATE: 16 BRPM | BODY MASS INDEX: 42.42 KG/M2

## 2021-08-10 DIAGNOSIS — K44.9 HIATAL HERNIA: ICD-10-CM

## 2021-08-10 DIAGNOSIS — K64.9 HEMORRHOIDS, UNSPECIFIED HEMORRHOID TYPE: ICD-10-CM

## 2021-08-10 DIAGNOSIS — Z91.89 CARDIOVASCULAR EVENT RISK: ICD-10-CM

## 2021-08-10 DIAGNOSIS — K21.9 GASTROESOPHAGEAL REFLUX DISEASE, UNSPECIFIED WHETHER ESOPHAGITIS PRESENT: Primary | ICD-10-CM

## 2021-08-10 DIAGNOSIS — K76.0 FATTY LIVER: ICD-10-CM

## 2021-08-10 DIAGNOSIS — I10 ESSENTIAL HYPERTENSION: ICD-10-CM

## 2021-08-10 DIAGNOSIS — Z82.49 FAMILY HISTORY OF PREMATURE CAD: ICD-10-CM

## 2021-08-10 DIAGNOSIS — E11.9 TYPE 2 DIABETES MELLITUS WITHOUT COMPLICATION, WITHOUT LONG-TERM CURRENT USE OF INSULIN: ICD-10-CM

## 2021-08-10 DIAGNOSIS — Z11.59 ENCOUNTER FOR HEPATITIS C SCREENING TEST FOR LOW RISK PATIENT: Primary | ICD-10-CM

## 2021-08-10 DIAGNOSIS — R14.2 BELCHING: ICD-10-CM

## 2021-08-10 DIAGNOSIS — F32.A DEPRESSION, UNSPECIFIED DEPRESSION TYPE: ICD-10-CM

## 2021-08-10 DIAGNOSIS — K76.0 NAFLD (NONALCOHOLIC FATTY LIVER DISEASE): ICD-10-CM

## 2021-08-10 DIAGNOSIS — E66.01 MORBID OBESITY: ICD-10-CM

## 2021-08-10 DIAGNOSIS — Z11.4 SCREENING FOR HIV (HUMAN IMMUNODEFICIENCY VIRUS): ICD-10-CM

## 2021-08-10 PROBLEM — R19.7 DIARRHEA: Status: RESOLVED | Noted: 2021-06-28 | Resolved: 2021-08-10

## 2021-08-10 PROBLEM — K57.90 DIVERTICULOSIS: Status: RESOLVED | Noted: 2021-06-07 | Resolved: 2021-08-10

## 2021-08-10 PROCEDURE — 99999 PR PBB SHADOW E&M-EST. PATIENT-LVL IV: CPT | Mod: PBBFAC,,, | Performed by: INTERNAL MEDICINE

## 2021-08-10 PROCEDURE — 99999 PR PBB SHADOW E&M-EST. PATIENT-LVL IV: ICD-10-PCS | Mod: PBBFAC,,, | Performed by: INTERNAL MEDICINE

## 2021-08-10 PROCEDURE — 99214 PR OFFICE/OUTPT VISIT, EST, LEVL IV, 30-39 MIN: ICD-10-PCS | Mod: S$GLB,,, | Performed by: FAMILY MEDICINE

## 2021-08-10 PROCEDURE — 99214 OFFICE O/P EST MOD 30 MIN: CPT | Mod: S$PBB,,, | Performed by: INTERNAL MEDICINE

## 2021-08-10 PROCEDURE — 99214 PR OFFICE/OUTPT VISIT, EST, LEVL IV, 30-39 MIN: ICD-10-PCS | Mod: S$PBB,,, | Performed by: INTERNAL MEDICINE

## 2021-08-10 PROCEDURE — 99214 OFFICE O/P EST MOD 30 MIN: CPT | Mod: S$GLB,,, | Performed by: FAMILY MEDICINE

## 2021-08-10 PROCEDURE — 99214 OFFICE O/P EST MOD 30 MIN: CPT | Mod: PBBFAC,PN | Performed by: INTERNAL MEDICINE

## 2021-08-10 RX ORDER — PANTOPRAZOLE SODIUM 40 MG/1
40 TABLET, DELAYED RELEASE ORAL 2 TIMES DAILY
Qty: 60 TABLET | Refills: 11 | Status: SHIPPED | OUTPATIENT
Start: 2021-08-10 | End: 2022-09-01

## 2021-08-10 RX ORDER — CLONIDINE HYDROCHLORIDE 0.2 MG/1
0.2 TABLET ORAL DAILY
Qty: 90 TABLET | Refills: 3 | Status: SHIPPED | OUTPATIENT
Start: 2021-08-10 | End: 2022-09-02 | Stop reason: SDUPTHER

## 2021-08-10 RX ORDER — DICYCLOMINE HYDROCHLORIDE 10 MG/1
10 CAPSULE ORAL 4 TIMES DAILY
Qty: 120 CAPSULE | Refills: 1 | Status: SHIPPED | OUTPATIENT
Start: 2021-08-10 | End: 2022-01-03

## 2021-08-10 RX ORDER — METOPROLOL SUCCINATE 50 MG/1
50 TABLET, EXTENDED RELEASE ORAL DAILY
Qty: 90 TABLET | Refills: 3 | Status: SHIPPED | OUTPATIENT
Start: 2021-08-10 | End: 2022-03-08 | Stop reason: SDUPTHER

## 2021-08-10 RX ORDER — BUPROPION HYDROCHLORIDE 150 MG/1
150 TABLET ORAL DAILY
Qty: 90 TABLET | Refills: 3 | Status: SHIPPED | OUTPATIENT
Start: 2021-08-10 | End: 2022-03-08 | Stop reason: SDUPTHER

## 2021-08-10 RX ORDER — ATORVASTATIN CALCIUM 10 MG/1
10 TABLET, FILM COATED ORAL DAILY
Qty: 90 TABLET | Refills: 3 | Status: SHIPPED | OUTPATIENT
Start: 2021-08-10 | End: 2022-03-08 | Stop reason: SDUPTHER

## 2021-08-24 DIAGNOSIS — G47.33 OSA (OBSTRUCTIVE SLEEP APNEA): Primary | ICD-10-CM

## 2021-08-26 ENCOUNTER — PROCEDURE VISIT (OUTPATIENT)
Dept: SLEEP MEDICINE | Facility: HOSPITAL | Age: 51
End: 2021-08-26
Attending: INTERNAL MEDICINE
Payer: MEDICAID

## 2021-08-26 DIAGNOSIS — G47.33 OSA (OBSTRUCTIVE SLEEP APNEA): ICD-10-CM

## 2021-08-26 PROCEDURE — 95811 POLYSOM 6/>YRS CPAP 4/> PARM: CPT

## 2021-09-07 ENCOUNTER — LAB VISIT (OUTPATIENT)
Dept: LAB | Facility: HOSPITAL | Age: 51
End: 2021-09-07
Attending: FAMILY MEDICINE
Payer: MEDICAID

## 2021-09-07 DIAGNOSIS — Z11.59 ENCOUNTER FOR HEPATITIS C SCREENING TEST FOR LOW RISK PATIENT: ICD-10-CM

## 2021-09-07 DIAGNOSIS — Z11.4 SCREENING FOR HIV (HUMAN IMMUNODEFICIENCY VIRUS): ICD-10-CM

## 2021-09-07 DIAGNOSIS — Z11.59 NEED FOR HEPATITIS C SCREENING TEST: ICD-10-CM

## 2021-09-07 DIAGNOSIS — E11.9 TYPE 2 DIABETES MELLITUS WITHOUT COMPLICATION, WITHOUT LONG-TERM CURRENT USE OF INSULIN: ICD-10-CM

## 2021-09-07 LAB
ALBUMIN SERPL BCP-MCNC: 3.8 G/DL (ref 3.5–5.2)
ALP SERPL-CCNC: 87 U/L (ref 55–135)
ALT SERPL W/O P-5'-P-CCNC: 20 U/L (ref 10–44)
ANION GAP SERPL CALC-SCNC: 12 MMOL/L (ref 8–16)
AST SERPL-CCNC: 19 U/L (ref 10–40)
BILIRUB SERPL-MCNC: 0.7 MG/DL (ref 0.1–1)
BUN SERPL-MCNC: 12 MG/DL (ref 6–20)
CALCIUM SERPL-MCNC: 10.7 MG/DL (ref 8.7–10.5)
CHLORIDE SERPL-SCNC: 102 MMOL/L (ref 95–110)
CO2 SERPL-SCNC: 24 MMOL/L (ref 23–29)
CREAT SERPL-MCNC: 0.9 MG/DL (ref 0.5–1.4)
EST. GFR  (AFRICAN AMERICAN): >60 ML/MIN/1.73 M^2
EST. GFR  (NON AFRICAN AMERICAN): >60 ML/MIN/1.73 M^2
ESTIMATED AVG GLUCOSE: 154 MG/DL (ref 68–131)
GLUCOSE SERPL-MCNC: 147 MG/DL (ref 70–110)
HBA1C MFR BLD: 7 % (ref 4–5.6)
POTASSIUM SERPL-SCNC: 4.5 MMOL/L (ref 3.5–5.1)
PROT SERPL-MCNC: 7.9 G/DL (ref 6–8.4)
SODIUM SERPL-SCNC: 138 MMOL/L (ref 136–145)

## 2021-09-07 PROCEDURE — 36415 COLL VENOUS BLD VENIPUNCTURE: CPT | Performed by: FAMILY MEDICINE

## 2021-09-07 PROCEDURE — 83036 HEMOGLOBIN GLYCOSYLATED A1C: CPT | Performed by: FAMILY MEDICINE

## 2021-09-07 PROCEDURE — 86803 HEPATITIS C AB TEST: CPT | Performed by: FAMILY MEDICINE

## 2021-09-07 PROCEDURE — 87389 HIV-1 AG W/HIV-1&-2 AB AG IA: CPT | Performed by: FAMILY MEDICINE

## 2021-09-07 PROCEDURE — 80053 COMPREHEN METABOLIC PANEL: CPT | Performed by: FAMILY MEDICINE

## 2021-09-08 LAB
HCV AB SERPL QL IA: NEGATIVE
HCV AB SERPL QL IA: NEGATIVE
HIV 1+2 AB+HIV1 P24 AG SERPL QL IA: NEGATIVE

## 2021-09-10 ENCOUNTER — OFFICE VISIT (OUTPATIENT)
Dept: FAMILY MEDICINE | Facility: CLINIC | Age: 51
End: 2021-09-10
Payer: MEDICAID

## 2021-09-10 VITALS
HEART RATE: 80 BPM | BODY MASS INDEX: 42.42 KG/M2 | RESPIRATION RATE: 18 BRPM | WEIGHT: 303 LBS | SYSTOLIC BLOOD PRESSURE: 135 MMHG | DIASTOLIC BLOOD PRESSURE: 79 MMHG | HEIGHT: 71 IN | OXYGEN SATURATION: 98 %

## 2021-09-10 DIAGNOSIS — M62.838 MUSCLE SPASM: ICD-10-CM

## 2021-09-10 DIAGNOSIS — E11.9 TYPE 2 DIABETES MELLITUS WITHOUT COMPLICATION, WITHOUT LONG-TERM CURRENT USE OF INSULIN: Primary | ICD-10-CM

## 2021-09-10 PROCEDURE — 99214 PR OFFICE/OUTPT VISIT, EST, LEVL IV, 30-39 MIN: ICD-10-PCS | Mod: S$PBB,,, | Performed by: FAMILY MEDICINE

## 2021-09-10 PROCEDURE — 99214 OFFICE O/P EST MOD 30 MIN: CPT | Mod: S$PBB,,, | Performed by: FAMILY MEDICINE

## 2021-09-10 PROCEDURE — 99999 PR PBB SHADOW E&M-EST. PATIENT-LVL III: ICD-10-PCS | Mod: PBBFAC,,, | Performed by: FAMILY MEDICINE

## 2021-09-10 PROCEDURE — 99999 PR PBB SHADOW E&M-EST. PATIENT-LVL III: CPT | Mod: PBBFAC,,, | Performed by: FAMILY MEDICINE

## 2021-09-10 PROCEDURE — 99213 OFFICE O/P EST LOW 20 MIN: CPT | Mod: PBBFAC | Performed by: FAMILY MEDICINE

## 2021-09-10 RX ORDER — CYCLOBENZAPRINE HCL 10 MG
10 TABLET ORAL 3 TIMES DAILY PRN
Qty: 30 TABLET | Refills: 0 | Status: SHIPPED | OUTPATIENT
Start: 2021-09-10 | End: 2021-09-20

## 2021-09-21 ENCOUNTER — IMMUNIZATION (OUTPATIENT)
Dept: FAMILY MEDICINE | Facility: CLINIC | Age: 51
End: 2021-09-21
Payer: MEDICAID

## 2021-09-21 DIAGNOSIS — Z23 NEED FOR VACCINATION: Primary | ICD-10-CM

## 2021-09-21 PROCEDURE — 91300 COVID-19, MRNA, LNP-S, PF, 30 MCG/0.3 ML DOSE VACCINE: CPT | Mod: PBBFAC

## 2021-09-23 ENCOUNTER — OFFICE VISIT (OUTPATIENT)
Dept: FAMILY MEDICINE | Facility: CLINIC | Age: 51
End: 2021-09-23
Payer: MEDICAID

## 2021-09-23 VITALS
BODY MASS INDEX: 42.56 KG/M2 | RESPIRATION RATE: 14 BRPM | HEIGHT: 71 IN | SYSTOLIC BLOOD PRESSURE: 132 MMHG | OXYGEN SATURATION: 97 % | HEART RATE: 78 BPM | WEIGHT: 304 LBS | DIASTOLIC BLOOD PRESSURE: 80 MMHG | TEMPERATURE: 98 F

## 2021-09-23 DIAGNOSIS — E11.42 TYPE 2 DIABETES MELLITUS WITH DIABETIC POLYNEUROPATHY, WITHOUT LONG-TERM CURRENT USE OF INSULIN: Primary | ICD-10-CM

## 2021-09-23 PROCEDURE — 99214 OFFICE O/P EST MOD 30 MIN: CPT | Mod: PBBFAC | Performed by: FAMILY MEDICINE

## 2021-09-23 PROCEDURE — 99214 PR OFFICE/OUTPT VISIT, EST, LEVL IV, 30-39 MIN: ICD-10-PCS | Mod: S$PBB,,, | Performed by: FAMILY MEDICINE

## 2021-09-23 PROCEDURE — 99214 OFFICE O/P EST MOD 30 MIN: CPT | Mod: S$PBB,,, | Performed by: FAMILY MEDICINE

## 2021-09-23 PROCEDURE — 99999 PR PBB SHADOW E&M-EST. PATIENT-LVL IV: CPT | Mod: PBBFAC,,, | Performed by: FAMILY MEDICINE

## 2021-09-23 PROCEDURE — 99999 PR PBB SHADOW E&M-EST. PATIENT-LVL IV: ICD-10-PCS | Mod: PBBFAC,,, | Performed by: FAMILY MEDICINE

## 2021-09-23 RX ORDER — GABAPENTIN 100 MG/1
100 CAPSULE ORAL NIGHTLY
Qty: 90 CAPSULE | Refills: 3 | Status: SHIPPED | OUTPATIENT
Start: 2021-09-23 | End: 2022-03-08 | Stop reason: SDUPTHER

## 2021-10-11 ENCOUNTER — OFFICE VISIT (OUTPATIENT)
Dept: FAMILY MEDICINE | Facility: CLINIC | Age: 51
End: 2021-10-11
Payer: MEDICAID

## 2021-10-11 VITALS
TEMPERATURE: 98 F | HEIGHT: 71 IN | SYSTOLIC BLOOD PRESSURE: 134 MMHG | RESPIRATION RATE: 16 BRPM | HEART RATE: 79 BPM | WEIGHT: 305.19 LBS | DIASTOLIC BLOOD PRESSURE: 88 MMHG | BODY MASS INDEX: 42.73 KG/M2 | OXYGEN SATURATION: 96 %

## 2021-10-11 DIAGNOSIS — J40 BRONCHITIS: Primary | ICD-10-CM

## 2021-10-11 PROCEDURE — 99214 OFFICE O/P EST MOD 30 MIN: CPT | Mod: S$PBB,,, | Performed by: FAMILY MEDICINE

## 2021-10-11 PROCEDURE — 96372 THER/PROPH/DIAG INJ SC/IM: CPT | Mod: PBBFAC

## 2021-10-11 PROCEDURE — 99214 PR OFFICE/OUTPT VISIT, EST, LEVL IV, 30-39 MIN: ICD-10-PCS | Mod: S$PBB,,, | Performed by: FAMILY MEDICINE

## 2021-10-11 PROCEDURE — 99214 OFFICE O/P EST MOD 30 MIN: CPT | Mod: PBBFAC | Performed by: FAMILY MEDICINE

## 2021-10-11 PROCEDURE — 99999 PR PBB SHADOW E&M-EST. PATIENT-LVL IV: ICD-10-PCS | Mod: PBBFAC,,, | Performed by: FAMILY MEDICINE

## 2021-10-11 PROCEDURE — 99999 PR PBB SHADOW E&M-EST. PATIENT-LVL IV: CPT | Mod: PBBFAC,,, | Performed by: FAMILY MEDICINE

## 2021-10-11 RX ORDER — DEXAMETHASONE SODIUM PHOSPHATE 100 MG/10ML
4 INJECTION INTRAMUSCULAR; INTRAVENOUS
Status: COMPLETED | OUTPATIENT
Start: 2021-10-11 | End: 2021-10-11

## 2021-10-11 RX ADMIN — DEXAMETHASONE SODIUM PHOSPHATE 4 MG: 10 INJECTION INTRAMUSCULAR; INTRAVENOUS at 10:10

## 2021-10-12 ENCOUNTER — IMMUNIZATION (OUTPATIENT)
Dept: FAMILY MEDICINE | Facility: CLINIC | Age: 51
End: 2021-10-12
Payer: MEDICAID

## 2021-10-12 DIAGNOSIS — Z23 NEED FOR VACCINATION: Primary | ICD-10-CM

## 2021-10-12 PROCEDURE — 0002A COVID-19, MRNA, LNP-S, PF, 30 MCG/0.3 ML DOSE VACCINE: CPT | Mod: PBBFAC | Performed by: FAMILY MEDICINE

## 2021-10-12 PROCEDURE — 91300 COVID-19, MRNA, LNP-S, PF, 30 MCG/0.3 ML DOSE VACCINE: CPT | Mod: PBBFAC

## 2021-12-07 ENCOUNTER — OFFICE VISIT (OUTPATIENT)
Dept: FAMILY MEDICINE | Facility: CLINIC | Age: 51
End: 2021-12-07
Payer: MEDICAID

## 2021-12-07 ENCOUNTER — LAB VISIT (OUTPATIENT)
Dept: LAB | Facility: HOSPITAL | Age: 51
End: 2021-12-07
Attending: FAMILY MEDICINE
Payer: MEDICAID

## 2021-12-07 VITALS
SYSTOLIC BLOOD PRESSURE: 132 MMHG | WEIGHT: 315 LBS | BODY MASS INDEX: 44.1 KG/M2 | DIASTOLIC BLOOD PRESSURE: 78 MMHG | OXYGEN SATURATION: 96 % | RESPIRATION RATE: 16 BRPM | HEIGHT: 71 IN | TEMPERATURE: 98 F | HEART RATE: 84 BPM

## 2021-12-07 DIAGNOSIS — G47.33 OSA (OBSTRUCTIVE SLEEP APNEA): ICD-10-CM

## 2021-12-07 DIAGNOSIS — E11.42 TYPE 2 DIABETES MELLITUS WITH DIABETIC POLYNEUROPATHY, WITHOUT LONG-TERM CURRENT USE OF INSULIN: Primary | ICD-10-CM

## 2021-12-07 DIAGNOSIS — E11.42 TYPE 2 DIABETES MELLITUS WITH DIABETIC POLYNEUROPATHY, WITHOUT LONG-TERM CURRENT USE OF INSULIN: ICD-10-CM

## 2021-12-07 LAB
ALBUMIN SERPL BCP-MCNC: 3.9 G/DL (ref 3.5–5.2)
ALP SERPL-CCNC: 92 U/L (ref 55–135)
ALT SERPL W/O P-5'-P-CCNC: 21 U/L (ref 10–44)
ANION GAP SERPL CALC-SCNC: 9 MMOL/L (ref 8–16)
AST SERPL-CCNC: 16 U/L (ref 10–40)
BILIRUB SERPL-MCNC: 0.5 MG/DL (ref 0.1–1)
BUN SERPL-MCNC: 21 MG/DL (ref 6–20)
CALCIUM SERPL-MCNC: 9.7 MG/DL (ref 8.7–10.5)
CHLORIDE SERPL-SCNC: 104 MMOL/L (ref 95–110)
CO2 SERPL-SCNC: 26 MMOL/L (ref 23–29)
CREAT SERPL-MCNC: 1 MG/DL (ref 0.5–1.4)
EST. GFR  (AFRICAN AMERICAN): >60 ML/MIN/1.73 M^2
EST. GFR  (NON AFRICAN AMERICAN): >60 ML/MIN/1.73 M^2
ESTIMATED AVG GLUCOSE: 148 MG/DL (ref 68–131)
GLUCOSE SERPL-MCNC: 135 MG/DL (ref 70–110)
HBA1C MFR BLD: 6.8 % (ref 4–5.6)
POTASSIUM SERPL-SCNC: 4.3 MMOL/L (ref 3.5–5.1)
PROT SERPL-MCNC: 7.7 G/DL (ref 6–8.4)
SODIUM SERPL-SCNC: 139 MMOL/L (ref 136–145)

## 2021-12-07 PROCEDURE — 36415 COLL VENOUS BLD VENIPUNCTURE: CPT | Performed by: FAMILY MEDICINE

## 2021-12-07 PROCEDURE — 99999 PR PBB SHADOW E&M-EST. PATIENT-LVL III: CPT | Mod: PBBFAC,,, | Performed by: FAMILY MEDICINE

## 2021-12-07 PROCEDURE — 99999 PR PBB SHADOW E&M-EST. PATIENT-LVL III: ICD-10-PCS | Mod: PBBFAC,,, | Performed by: FAMILY MEDICINE

## 2021-12-07 PROCEDURE — 99214 PR OFFICE/OUTPT VISIT, EST, LEVL IV, 30-39 MIN: ICD-10-PCS | Mod: S$PBB,,, | Performed by: FAMILY MEDICINE

## 2021-12-07 PROCEDURE — 83036 HEMOGLOBIN GLYCOSYLATED A1C: CPT | Performed by: FAMILY MEDICINE

## 2021-12-07 PROCEDURE — 3066F NEPHROPATHY DOC TX: CPT | Mod: CPTII,,, | Performed by: FAMILY MEDICINE

## 2021-12-07 PROCEDURE — 3061F PR NEG MICROALBUMINURIA RESULT DOCUMENTED/REVIEW: ICD-10-PCS | Mod: CPTII,,, | Performed by: FAMILY MEDICINE

## 2021-12-07 PROCEDURE — 3066F PR DOCUMENTATION OF TREATMENT FOR NEPHROPATHY: ICD-10-PCS | Mod: CPTII,,, | Performed by: FAMILY MEDICINE

## 2021-12-07 PROCEDURE — 99214 OFFICE O/P EST MOD 30 MIN: CPT | Mod: S$PBB,,, | Performed by: FAMILY MEDICINE

## 2021-12-07 PROCEDURE — 80053 COMPREHEN METABOLIC PANEL: CPT | Performed by: FAMILY MEDICINE

## 2021-12-07 PROCEDURE — 99213 OFFICE O/P EST LOW 20 MIN: CPT | Mod: PBBFAC | Performed by: FAMILY MEDICINE

## 2021-12-07 PROCEDURE — 3061F NEG MICROALBUMINURIA REV: CPT | Mod: CPTII,,, | Performed by: FAMILY MEDICINE

## 2021-12-08 ENCOUNTER — TELEPHONE (OUTPATIENT)
Dept: CARDIOLOGY | Facility: CLINIC | Age: 51
End: 2021-12-08
Payer: MEDICAID

## 2022-03-08 ENCOUNTER — OFFICE VISIT (OUTPATIENT)
Dept: FAMILY MEDICINE | Facility: CLINIC | Age: 52
End: 2022-03-08
Payer: MEDICAID

## 2022-03-08 VITALS
OXYGEN SATURATION: 97 % | RESPIRATION RATE: 14 BRPM | DIASTOLIC BLOOD PRESSURE: 82 MMHG | WEIGHT: 315 LBS | HEART RATE: 81 BPM | HEIGHT: 71 IN | TEMPERATURE: 98 F | SYSTOLIC BLOOD PRESSURE: 132 MMHG | BODY MASS INDEX: 44.1 KG/M2

## 2022-03-08 DIAGNOSIS — I10 ESSENTIAL HYPERTENSION: ICD-10-CM

## 2022-03-08 DIAGNOSIS — E11.42 TYPE 2 DIABETES MELLITUS WITH DIABETIC POLYNEUROPATHY, WITHOUT LONG-TERM CURRENT USE OF INSULIN: ICD-10-CM

## 2022-03-08 DIAGNOSIS — Z82.49 FAMILY HISTORY OF PREMATURE CAD: ICD-10-CM

## 2022-03-08 DIAGNOSIS — Z23 NEED FOR PNEUMOCOCCAL VACCINATION: Primary | ICD-10-CM

## 2022-03-08 DIAGNOSIS — E11.9 TYPE 2 DIABETES MELLITUS WITHOUT COMPLICATION, WITHOUT LONG-TERM CURRENT USE OF INSULIN: ICD-10-CM

## 2022-03-08 DIAGNOSIS — F32.A DEPRESSION, UNSPECIFIED DEPRESSION TYPE: ICD-10-CM

## 2022-03-08 DIAGNOSIS — M1A.09X0 IDIOPATHIC CHRONIC GOUT OF MULTIPLE SITES WITHOUT TOPHUS: ICD-10-CM

## 2022-03-08 DIAGNOSIS — Z91.89 CARDIOVASCULAR EVENT RISK: ICD-10-CM

## 2022-03-08 PROCEDURE — 3079F DIAST BP 80-89 MM HG: CPT | Mod: CPTII,,, | Performed by: FAMILY MEDICINE

## 2022-03-08 PROCEDURE — 1159F PR MEDICATION LIST DOCUMENTED IN MEDICAL RECORD: ICD-10-PCS | Mod: CPTII,,, | Performed by: FAMILY MEDICINE

## 2022-03-08 PROCEDURE — 90471 IMMUNIZATION ADMIN: CPT | Mod: PBBFAC

## 2022-03-08 PROCEDURE — 3008F BODY MASS INDEX DOCD: CPT | Mod: CPTII,,, | Performed by: FAMILY MEDICINE

## 2022-03-08 PROCEDURE — 3075F SYST BP GE 130 - 139MM HG: CPT | Mod: CPTII,,, | Performed by: FAMILY MEDICINE

## 2022-03-08 PROCEDURE — 1159F MED LIST DOCD IN RCRD: CPT | Mod: CPTII,,, | Performed by: FAMILY MEDICINE

## 2022-03-08 PROCEDURE — 3075F PR MOST RECENT SYSTOLIC BLOOD PRESS GE 130-139MM HG: ICD-10-PCS | Mod: CPTII,,, | Performed by: FAMILY MEDICINE

## 2022-03-08 PROCEDURE — 1160F PR REVIEW ALL MEDS BY PRESCRIBER/CLIN PHARMACIST DOCUMENTED: ICD-10-PCS | Mod: CPTII,,, | Performed by: FAMILY MEDICINE

## 2022-03-08 PROCEDURE — 3079F PR MOST RECENT DIASTOLIC BLOOD PRESSURE 80-89 MM HG: ICD-10-PCS | Mod: CPTII,,, | Performed by: FAMILY MEDICINE

## 2022-03-08 PROCEDURE — 99214 PR OFFICE/OUTPT VISIT, EST, LEVL IV, 30-39 MIN: ICD-10-PCS | Mod: S$PBB,,, | Performed by: FAMILY MEDICINE

## 2022-03-08 PROCEDURE — 3008F PR BODY MASS INDEX (BMI) DOCUMENTED: ICD-10-PCS | Mod: CPTII,,, | Performed by: FAMILY MEDICINE

## 2022-03-08 PROCEDURE — 1160F RVW MEDS BY RX/DR IN RCRD: CPT | Mod: CPTII,,, | Performed by: FAMILY MEDICINE

## 2022-03-08 PROCEDURE — 99999 PR PBB SHADOW E&M-EST. PATIENT-LVL IV: ICD-10-PCS | Mod: PBBFAC,,, | Performed by: FAMILY MEDICINE

## 2022-03-08 PROCEDURE — 99999 PR PBB SHADOW E&M-EST. PATIENT-LVL IV: CPT | Mod: PBBFAC,,, | Performed by: FAMILY MEDICINE

## 2022-03-08 PROCEDURE — 99214 OFFICE O/P EST MOD 30 MIN: CPT | Mod: S$PBB,,, | Performed by: FAMILY MEDICINE

## 2022-03-08 PROCEDURE — 99214 OFFICE O/P EST MOD 30 MIN: CPT | Mod: PBBFAC | Performed by: FAMILY MEDICINE

## 2022-03-08 RX ORDER — METOPROLOL SUCCINATE 50 MG/1
50 TABLET, EXTENDED RELEASE ORAL DAILY
Qty: 90 TABLET | Refills: 3 | Status: SHIPPED | OUTPATIENT
Start: 2022-03-08 | End: 2023-05-03

## 2022-03-08 RX ORDER — ATORVASTATIN CALCIUM 10 MG/1
10 TABLET, FILM COATED ORAL DAILY
Qty: 90 TABLET | Refills: 3 | Status: SHIPPED | OUTPATIENT
Start: 2022-03-08 | End: 2023-03-30

## 2022-03-08 RX ORDER — ALLOPURINOL 100 MG/1
100 TABLET ORAL DAILY
Qty: 90 TABLET | Refills: 3 | Status: SHIPPED | OUTPATIENT
Start: 2022-03-08 | End: 2023-04-25

## 2022-03-08 RX ORDER — GABAPENTIN 300 MG/1
300 CAPSULE ORAL NIGHTLY
Qty: 90 CAPSULE | Refills: 3 | Status: SHIPPED | OUTPATIENT
Start: 2022-03-08 | End: 2023-09-11

## 2022-03-08 RX ORDER — BUPROPION HYDROCHLORIDE 150 MG/1
150 TABLET ORAL DAILY
Qty: 90 TABLET | Refills: 3 | Status: SHIPPED | OUTPATIENT
Start: 2022-03-08 | End: 2023-05-03

## 2022-03-08 NOTE — PROGRESS NOTES
[facility] - Clinic Note    Subjective      Mr. Tam is a 52 y.o. male who presents to clinic for Follow-up (3mth follow up requesting refills on all medications)    In regard to the patient's diabetes, patient reports that blood sugars have been Good control. Patient denies hypoglycemia. Patient is not currently on insulin therapy. Patient is not on ACE/ARB and is on statin. Last a1c was   Hemoglobin A1C   Date Value Ref Range Status   12/07/2021 6.8 (H) 4.0 - 5.6 % Final     Comment:     ADA Screening Guidelines:  5.7-6.4%  Consistent with prediabetes  >or=6.5%  Consistent with diabetes    High levels of fetal hemoglobin interfere with the HbA1C  assay. Heterozygous hemoglobin variants (HbS, HgC, etc)do  not significantly interfere with this assay.   However, presence of multiple variants may affect accuracy.     09/07/2021 7.0 (H) 4.0 - 5.6 % Final     Comment:     ADA Screening Guidelines:  5.7-6.4%  Consistent with prediabetes  >or=6.5%  Consistent with diabetes    High levels of fetal hemoglobin interfere with the HbA1C  assay. Heterozygous hemoglobin variants (HbS, HgC, etc)do  not significantly interfere with this assay.   However, presence of multiple variants may affect accuracy.     05/27/2021 6.9 (H) 4.0 - 5.6 % Final     Comment:     ADA Screening Guidelines:  5.7-6.4%  Consistent with prediabetes  >or=6.5%  Consistent with diabetes    High levels of fetal hemoglobin interfere with the HbA1C  assay. Heterozygous hemoglobin variants (HbS, HgC, etc)do  not significantly interfere with this assay.   However, presence of multiple variants may affect accuracy.       Gabapentin does not seem to be working as much as it used to.    PMGINA Romero has a past medical history of Diabetes mellitus, GERD (gastroesophageal reflux disease), Gout, Hypertension, and Obesity.   PSXH Nick has a past surgical history that includes Cholecystectomy; Colonoscopy (N/A, 7/28/2021); and Esophagogastroduodenoscopy (N/A,  "7/28/2021).   GRAY Romero's family history is not on file.   SHAI Romero reports that he has never smoked. He has never used smokeless tobacco. He reports previous alcohol use. He reports that he does not use drugs.   SANDIP Romero is allergic to shrimp, codeine, and pcn [penicillins].   LEMUEL Romero has a current medication list which includes the following prescription(s): clonidine, dicyclomine, pantoprazole, pen needle, diabetic, allopurinol, atorvastatin, bupropion, gabapentin, liraglutide 0.6 mg/0.1 ml (18 mg/3 ml) subq pnij, metoprolol succinate, and nitroglycerin.     Review of Systems   Constitutional: Negative for chills and fever.   Eyes: Negative for visual disturbance.   Respiratory: Negative for shortness of breath.    Cardiovascular: Negative for chest pain.     Objective     /82 (BP Location: Left arm, Patient Position: Sitting, BP Method: X-Large (Manual))   Pulse 81   Temp 97.8 °F (36.6 °C) (Temporal)   Resp 14   Ht 5' 11" (1.803 m)   Wt (!) 149.1 kg (328 lb 12.8 oz)   SpO2 97%   BMI 45.86 kg/m²     Physical Exam  Vitals and nursing note reviewed.   Constitutional:       General: He is not in acute distress.     Appearance: Normal appearance. He is well-developed. He is not diaphoretic.   HENT:      Head: Normocephalic and atraumatic.      Right Ear: External ear normal.      Left Ear: External ear normal.   Eyes:      General:         Right eye: No discharge.         Left eye: No discharge.   Cardiovascular:      Rate and Rhythm: Normal rate and regular rhythm.      Heart sounds: Normal heart sounds.   Pulmonary:      Effort: Pulmonary effort is normal.      Breath sounds: Normal breath sounds. No wheezing or rales.   Skin:     General: Skin is warm and dry.   Neurological:      Mental Status: He is alert and oriented to person, place, and time. Mental status is at baseline.   Psychiatric:         Mood and Affect: Mood normal.         Behavior: Behavior normal.         Thought Content: Thought " content normal.         Judgment: Judgment normal.        Assessment/Plan     1. Need for pneumococcal vaccination  (In Office Administered) Pneumococcal Polysaccharide Vaccine (23 Valent) (SQ/IM)   2. Essential hypertension, onset before 1987  metoprolol succinate (TOPROL-XL) 50 MG 24 hr tablet   3. Type 2 diabetes mellitus without complication, without long-term current use of insulin  liraglutide 0.6 mg/0.1 mL, 18 mg/3 mL, subq PNIJ (VICTOZA 2-JORDON) 0.6 mg/0.1 mL (18 mg/3 mL) PnIj pen    atorvastatin (LIPITOR) 10 MG tablet   4. Type 2 diabetes mellitus with diabetic polyneuropathy, without long-term current use of insulin  gabapentin (NEURONTIN) 300 MG capsule   5. Depression, unspecified depression type  buPROPion (WELLBUTRIN XL) 150 MG TB24 tablet   6. Cardiovascular event risk, ASCVD 10-year risk 8.7%, 2021  atorvastatin (LIPITOR) 10 MG tablet   7. Family history of premature CAD  atorvastatin (LIPITOR) 10 MG tablet   8. Idiopathic chronic gout of multiple sites without tophus  allopurinoL (ZYLOPRIM) 100 MG tablet     Pneumovax today.  Increase gabapentin to 300 mg q.h.s.  Follow-up in 3 months.    Future Appointments   Date Time Provider Department Center   6/9/2022  2:00 PM [place] [facility] [place] [facility]   7/25/2022 10:00 AM Dougie Templeton [place] [facility] [place] [facility]         Alexander Fisher MD  Family Medicine  [facility]

## 2022-06-01 ENCOUNTER — PATIENT MESSAGE (OUTPATIENT)
Dept: ADMINISTRATIVE | Facility: HOSPITAL | Age: 52
End: 2022-06-01
Payer: MEDICAID

## 2022-06-09 ENCOUNTER — OFFICE VISIT (OUTPATIENT)
Dept: FAMILY MEDICINE | Facility: CLINIC | Age: 52
End: 2022-06-09
Payer: MEDICAID

## 2022-06-09 ENCOUNTER — TELEPHONE (OUTPATIENT)
Dept: FAMILY MEDICINE | Facility: CLINIC | Age: 52
End: 2022-06-09

## 2022-06-09 VITALS
DIASTOLIC BLOOD PRESSURE: 88 MMHG | HEART RATE: 73 BPM | BODY MASS INDEX: 49.44 KG/M2 | HEIGHT: 67 IN | WEIGHT: 315 LBS | TEMPERATURE: 98 F | SYSTOLIC BLOOD PRESSURE: 138 MMHG | OXYGEN SATURATION: 97 % | RESPIRATION RATE: 19 BRPM

## 2022-06-09 DIAGNOSIS — Z00.00 ANNUAL PHYSICAL EXAM: Primary | ICD-10-CM

## 2022-06-09 DIAGNOSIS — K21.9 GASTROESOPHAGEAL REFLUX DISEASE, UNSPECIFIED WHETHER ESOPHAGITIS PRESENT: ICD-10-CM

## 2022-06-09 DIAGNOSIS — Z23 NEED FOR SHINGLES VACCINE: ICD-10-CM

## 2022-06-09 DIAGNOSIS — R11.0 NAUSEA: ICD-10-CM

## 2022-06-09 DIAGNOSIS — E11.42 TYPE 2 DIABETES MELLITUS WITH DIABETIC POLYNEUROPATHY, WITHOUT LONG-TERM CURRENT USE OF INSULIN: ICD-10-CM

## 2022-06-09 PROCEDURE — 99999 PR PBB SHADOW E&M-EST. PATIENT-LVL IV: CPT | Mod: PBBFAC,,, | Performed by: FAMILY MEDICINE

## 2022-06-09 PROCEDURE — 3079F PR MOST RECENT DIASTOLIC BLOOD PRESSURE 80-89 MM HG: ICD-10-PCS | Mod: CPTII,,, | Performed by: FAMILY MEDICINE

## 2022-06-09 PROCEDURE — 3008F BODY MASS INDEX DOCD: CPT | Mod: CPTII,,, | Performed by: FAMILY MEDICINE

## 2022-06-09 PROCEDURE — 1159F PR MEDICATION LIST DOCUMENTED IN MEDICAL RECORD: ICD-10-PCS | Mod: CPTII,,, | Performed by: FAMILY MEDICINE

## 2022-06-09 PROCEDURE — 3075F PR MOST RECENT SYSTOLIC BLOOD PRESS GE 130-139MM HG: ICD-10-PCS | Mod: CPTII,,, | Performed by: FAMILY MEDICINE

## 2022-06-09 PROCEDURE — 3079F DIAST BP 80-89 MM HG: CPT | Mod: CPTII,,, | Performed by: FAMILY MEDICINE

## 2022-06-09 PROCEDURE — 90750 HZV VACC RECOMBINANT IM: CPT | Mod: PBBFAC

## 2022-06-09 PROCEDURE — 99396 PR PREVENTIVE VISIT,EST,40-64: ICD-10-PCS | Mod: S$PBB,,, | Performed by: FAMILY MEDICINE

## 2022-06-09 PROCEDURE — 1160F PR REVIEW ALL MEDS BY PRESCRIBER/CLIN PHARMACIST DOCUMENTED: ICD-10-PCS | Mod: CPTII,,, | Performed by: FAMILY MEDICINE

## 2022-06-09 PROCEDURE — 99214 OFFICE O/P EST MOD 30 MIN: CPT | Mod: PBBFAC | Performed by: FAMILY MEDICINE

## 2022-06-09 PROCEDURE — 3075F SYST BP GE 130 - 139MM HG: CPT | Mod: CPTII,,, | Performed by: FAMILY MEDICINE

## 2022-06-09 PROCEDURE — 99999 PR PBB SHADOW E&M-EST. PATIENT-LVL IV: ICD-10-PCS | Mod: PBBFAC,,, | Performed by: FAMILY MEDICINE

## 2022-06-09 PROCEDURE — 1159F MED LIST DOCD IN RCRD: CPT | Mod: CPTII,,, | Performed by: FAMILY MEDICINE

## 2022-06-09 PROCEDURE — 3008F PR BODY MASS INDEX (BMI) DOCUMENTED: ICD-10-PCS | Mod: CPTII,,, | Performed by: FAMILY MEDICINE

## 2022-06-09 PROCEDURE — 99396 PREV VISIT EST AGE 40-64: CPT | Mod: S$PBB,,, | Performed by: FAMILY MEDICINE

## 2022-06-09 PROCEDURE — 1160F RVW MEDS BY RX/DR IN RCRD: CPT | Mod: CPTII,,, | Performed by: FAMILY MEDICINE

## 2022-06-09 RX ORDER — ONDANSETRON 4 MG/1
4 TABLET, ORALLY DISINTEGRATING ORAL EVERY 8 HOURS PRN
Qty: 30 TABLET | Refills: 1 | Status: SHIPPED | OUTPATIENT
Start: 2022-06-09 | End: 2022-12-13 | Stop reason: SDUPTHER

## 2022-06-09 NOTE — TELEPHONE ENCOUNTER
Attempted to schedule gastro appointment. Was unable to find a new patient appointment. Please assist in scheduling.

## 2022-06-09 NOTE — PROGRESS NOTES
[facility] - Clinic Note    Subjective      Mr. Tam is a 52 y.o. male who presents to clinic for Follow-up (Medication refills)    In regard to the patient's diabetes, patient reports that blood sugars have been Good control. Patient denies hypoglycemia. Patient is not currently on insulin therapy. Patient is not on ACE/ARB and is on statin. Last a1c was   Hemoglobin A1C   Date Value Ref Range Status   12/07/2021 6.8 (H) 4.0 - 5.6 % Final     Comment:     ADA Screening Guidelines:  5.7-6.4%  Consistent with prediabetes  >or=6.5%  Consistent with diabetes    High levels of fetal hemoglobin interfere with the HbA1C  assay. Heterozygous hemoglobin variants (HbS, HgC, etc)do  not significantly interfere with this assay.   However, presence of multiple variants may affect accuracy.     09/07/2021 7.0 (H) 4.0 - 5.6 % Final     Comment:     ADA Screening Guidelines:  5.7-6.4%  Consistent with prediabetes  >or=6.5%  Consistent with diabetes    High levels of fetal hemoglobin interfere with the HbA1C  assay. Heterozygous hemoglobin variants (HbS, HgC, etc)do  not significantly interfere with this assay.   However, presence of multiple variants may affect accuracy.     05/27/2021 6.9 (H) 4.0 - 5.6 % Final     Comment:     ADA Screening Guidelines:  5.7-6.4%  Consistent with prediabetes  >or=6.5%  Consistent with diabetes    High levels of fetal hemoglobin interfere with the HbA1C  assay. Heterozygous hemoglobin variants (HbS, HgC, etc)do  not significantly interfere with this assay.   However, presence of multiple variants may affect accuracy.       PMH Nick has a past medical history of Diabetes mellitus, GERD (gastroesophageal reflux disease), Gout, Hypertension, and Obesity.   PSXH Nick has a past surgical history that includes Cholecystectomy; Colonoscopy (N/A, 7/28/2021); and Esophagogastroduodenoscopy (N/A, 7/28/2021).    Nick's family history is not on file.    Nick reports that he has never smoked.  "He has never used smokeless tobacco. He reports previous alcohol use. He reports that he does not use drugs.   SANDIP Romero is allergic to shrimp, codeine, and pcn [penicillins].   LEMUEL Romero has a current medication list which includes the following prescription(s): allopurinol, atorvastatin, bupropion, clonidine, dicyclomine, gabapentin, liraglutide 0.6 mg/0.1 ml (18 mg/3 ml) subq pnij, metoprolol succinate, nitroglycerin, ondansetron, pantoprazole, and pen needle, diabetic.     Review of Systems   Constitutional: Negative for chills and fever.   Respiratory: Negative for shortness of breath.    Cardiovascular: Negative for chest pain.   Gastrointestinal: Positive for nausea.     Objective     /88 Comment: home  Pulse 73   Temp 98 °F (36.7 °C) (Oral)   Resp 19   Ht 5' 7" (1.702 m)   Wt (!) 149.7 kg (330 lb)   SpO2 97%   BMI 51.69 kg/m²     Physical Exam  Vitals and nursing note reviewed.   Constitutional:       General: He is not in acute distress.     Appearance: Normal appearance. He is well-developed. He is not diaphoretic.   HENT:      Head: Normocephalic and atraumatic.      Right Ear: External ear normal.      Left Ear: External ear normal.   Eyes:      General:         Right eye: No discharge.         Left eye: No discharge.   Cardiovascular:      Rate and Rhythm: Normal rate and regular rhythm.      Heart sounds: Normal heart sounds.   Pulmonary:      Effort: Pulmonary effort is normal.      Breath sounds: Normal breath sounds. No wheezing or rales.   Skin:     General: Skin is warm and dry.   Neurological:      Mental Status: He is alert and oriented to person, place, and time. Mental status is at baseline.   Psychiatric:         Mood and Affect: Mood normal.         Behavior: Behavior normal.         Thought Content: Thought content normal.         Judgment: Judgment normal.        Assessment/Plan     1. Annual physical exam     2. Type 2 diabetes mellitus with diabetic polyneuropathy, without " long-term current use of insulin  Lipid Panel    Comprehensive Metabolic Panel    CBC Auto Differential    Hemoglobin A1C    Microalbumin/Creatinine Ratio, Urine    Diabetic Eye Screening Photo    CANCELED: Microalbumin/Creatinine Ratio, Urine   3. Gastroesophageal reflux disease, unspecified whether esophagitis present  Ambulatory referral/consult to Gastroenterology   4. Nausea  ondansetron (ZOFRAN-ODT) 4 MG TbDL   5. Need for shingles vaccine  (In Office Administered) Zoster Recombinant Vaccine     Due for annual labs and microalbumin.  Referral to GI has been placed.  Zofran as needed for nausea.  Shingles vaccine today.  Diabetic eye camera today.  Follow-up in 6 months.    Future Appointments   Date Time Provider Department Center   7/25/2022 10:00 AM Dougie Templeton MD [facility] [place] [facility]         Alexander Fisher MD  Family Medicine  [facility]

## 2022-06-10 ENCOUNTER — CLINICAL SUPPORT (OUTPATIENT)
Dept: FAMILY MEDICINE | Facility: CLINIC | Age: 52
End: 2022-06-10
Payer: MEDICAID

## 2022-06-10 ENCOUNTER — LAB VISIT (OUTPATIENT)
Dept: LAB | Facility: HOSPITAL | Age: 52
End: 2022-06-10
Attending: FAMILY MEDICINE
Payer: MEDICAID

## 2022-06-10 DIAGNOSIS — E11.42 TYPE 2 DIABETES MELLITUS WITH DIABETIC POLYNEUROPATHY, WITHOUT LONG-TERM CURRENT USE OF INSULIN: ICD-10-CM

## 2022-06-10 DIAGNOSIS — E11.42 TYPE 2 DIABETES MELLITUS WITH DIABETIC POLYNEUROPATHY, WITHOUT LONG-TERM CURRENT USE OF INSULIN: Primary | ICD-10-CM

## 2022-06-10 LAB
ALBUMIN SERPL BCP-MCNC: 3.8 G/DL (ref 3.5–5.2)
ALP SERPL-CCNC: 96 U/L (ref 55–135)
ALT SERPL W/O P-5'-P-CCNC: 31 U/L (ref 10–44)
ANION GAP SERPL CALC-SCNC: 9 MMOL/L (ref 8–16)
AST SERPL-CCNC: 22 U/L (ref 10–40)
BASOPHILS # BLD AUTO: 0.1 K/UL (ref 0–0.2)
BASOPHILS NFR BLD: 1 % (ref 0–1.9)
BILIRUB SERPL-MCNC: 0.8 MG/DL (ref 0.1–1)
BUN SERPL-MCNC: 16 MG/DL (ref 6–20)
CALCIUM SERPL-MCNC: 9.9 MG/DL (ref 8.7–10.5)
CHLORIDE SERPL-SCNC: 104 MMOL/L (ref 95–110)
CHOLEST SERPL-MCNC: 153 MG/DL (ref 120–199)
CHOLEST/HDLC SERPL: 3.1 {RATIO} (ref 2–5)
CO2 SERPL-SCNC: 27 MMOL/L (ref 23–29)
CREAT SERPL-MCNC: 1 MG/DL (ref 0.5–1.4)
DIFFERENTIAL METHOD: ABNORMAL
EOSINOPHIL # BLD AUTO: 0.2 K/UL (ref 0–0.5)
EOSINOPHIL NFR BLD: 2.5 % (ref 0–8)
ERYTHROCYTE [DISTWIDTH] IN BLOOD BY AUTOMATED COUNT: 12.2 % (ref 11.5–14.5)
EST. GFR  (AFRICAN AMERICAN): >60 ML/MIN/1.73 M^2
EST. GFR  (NON AFRICAN AMERICAN): >60 ML/MIN/1.73 M^2
ESTIMATED AVG GLUCOSE: 166 MG/DL (ref 68–131)
GLUCOSE SERPL-MCNC: 178 MG/DL (ref 70–110)
HBA1C MFR BLD: 7.4 % (ref 4–5.6)
HCT VFR BLD AUTO: 43.4 % (ref 40–54)
HDLC SERPL-MCNC: 49 MG/DL (ref 40–75)
HDLC SERPL: 32 % (ref 20–50)
HGB BLD-MCNC: 14.6 G/DL (ref 14–18)
IMM GRANULOCYTES # BLD AUTO: 0.02 K/UL (ref 0–0.04)
IMM GRANULOCYTES NFR BLD AUTO: 0.2 % (ref 0–0.5)
LDLC SERPL CALC-MCNC: 77.2 MG/DL (ref 63–159)
LYMPHOCYTES # BLD AUTO: 2.9 K/UL (ref 1–4.8)
LYMPHOCYTES NFR BLD: 29.8 % (ref 18–48)
MCH RBC QN AUTO: 30.9 PG (ref 27–31)
MCHC RBC AUTO-ENTMCNC: 33.6 G/DL (ref 32–36)
MCV RBC AUTO: 92 FL (ref 82–98)
MONOCYTES # BLD AUTO: 0.9 K/UL (ref 0.3–1)
MONOCYTES NFR BLD: 8.8 % (ref 4–15)
NEUTROPHILS # BLD AUTO: 5.7 K/UL (ref 1.8–7.7)
NEUTROPHILS NFR BLD: 57.7 % (ref 38–73)
NONHDLC SERPL-MCNC: 104 MG/DL
NRBC BLD-RTO: 0 /100 WBC
PLATELET # BLD AUTO: 207 K/UL (ref 150–450)
PMV BLD AUTO: 9 FL (ref 9.2–12.9)
POTASSIUM SERPL-SCNC: 4.6 MMOL/L (ref 3.5–5.1)
PROT SERPL-MCNC: 7.9 G/DL (ref 6–8.4)
RBC # BLD AUTO: 4.72 M/UL (ref 4.6–6.2)
SODIUM SERPL-SCNC: 140 MMOL/L (ref 136–145)
TRIGL SERPL-MCNC: 134 MG/DL (ref 30–150)
WBC # BLD AUTO: 9.78 K/UL (ref 3.9–12.7)

## 2022-06-10 PROCEDURE — 83036 HEMOGLOBIN GLYCOSYLATED A1C: CPT | Performed by: FAMILY MEDICINE

## 2022-06-10 PROCEDURE — 80053 COMPREHEN METABOLIC PANEL: CPT | Performed by: FAMILY MEDICINE

## 2022-06-10 PROCEDURE — 80061 LIPID PANEL: CPT | Performed by: FAMILY MEDICINE

## 2022-06-10 PROCEDURE — 36415 COLL VENOUS BLD VENIPUNCTURE: CPT | Performed by: FAMILY MEDICINE

## 2022-06-10 PROCEDURE — 85025 COMPLETE CBC W/AUTO DIFF WBC: CPT | Performed by: FAMILY MEDICINE

## 2022-06-10 NOTE — PATIENT INSTRUCTIONS
Nick Solomonelizabeth, a 52 y.o. male is here for a diabetic eye screening with non-dilated fundus photos per provider.     Patient cooperative?: Yes  Small pupils?: No  Last eye exam: unknown   Glasses / Contacts? Patient does not need glasses or contacts  Surgery / Procedures? none  Procedure Explained to patient  Patient Voiced understanding? Yes  Complaints from patient? No If yes, explain N/A    Eula Nolasco LPN

## 2022-06-16 DIAGNOSIS — E11.9 TYPE 2 DIABETES MELLITUS WITHOUT COMPLICATION, WITHOUT LONG-TERM CURRENT USE OF INSULIN: ICD-10-CM

## 2022-07-07 ENCOUNTER — TELEPHONE (OUTPATIENT)
Dept: FAMILY MEDICINE | Facility: CLINIC | Age: 52
End: 2022-07-07
Payer: MEDICAID

## 2022-07-11 ENCOUNTER — PATIENT OUTREACH (OUTPATIENT)
Dept: ADMINISTRATIVE | Facility: HOSPITAL | Age: 52
End: 2022-07-11
Payer: MEDICAID

## 2022-07-11 ENCOUNTER — LAB VISIT (OUTPATIENT)
Dept: LAB | Facility: HOSPITAL | Age: 52
End: 2022-07-11
Attending: INTERNAL MEDICINE
Payer: MEDICAID

## 2022-07-11 ENCOUNTER — OFFICE VISIT (OUTPATIENT)
Dept: GASTROENTEROLOGY | Facility: CLINIC | Age: 52
End: 2022-07-11
Payer: MEDICAID

## 2022-07-11 VITALS
HEART RATE: 77 BPM | BODY MASS INDEX: 49.44 KG/M2 | WEIGHT: 315 LBS | OXYGEN SATURATION: 98 % | SYSTOLIC BLOOD PRESSURE: 137 MMHG | DIASTOLIC BLOOD PRESSURE: 91 MMHG | RESPIRATION RATE: 20 BRPM | HEIGHT: 67 IN

## 2022-07-11 DIAGNOSIS — R11.0 NAUSEA: ICD-10-CM

## 2022-07-11 DIAGNOSIS — R11.0 NAUSEA: Primary | ICD-10-CM

## 2022-07-11 DIAGNOSIS — R10.9 ABDOMINAL PAIN, UNSPECIFIED ABDOMINAL LOCATION: ICD-10-CM

## 2022-07-11 DIAGNOSIS — R14.2 BELCHING: ICD-10-CM

## 2022-07-11 DIAGNOSIS — K44.9 HIATAL HERNIA: ICD-10-CM

## 2022-07-11 DIAGNOSIS — K21.9 GASTROESOPHAGEAL REFLUX DISEASE, UNSPECIFIED WHETHER ESOPHAGITIS PRESENT: ICD-10-CM

## 2022-07-11 DIAGNOSIS — K76.0 FATTY LIVER: ICD-10-CM

## 2022-07-11 DIAGNOSIS — E66.01 MORBID OBESITY: ICD-10-CM

## 2022-07-11 DIAGNOSIS — K64.9 HEMORRHOIDS, UNSPECIFIED HEMORRHOID TYPE: ICD-10-CM

## 2022-07-11 LAB
ALBUMIN SERPL BCP-MCNC: 3.7 G/DL (ref 3.5–5.2)
ALP SERPL-CCNC: 95 U/L (ref 55–135)
ALT SERPL W/O P-5'-P-CCNC: 33 U/L (ref 10–44)
ANION GAP SERPL CALC-SCNC: 10 MMOL/L (ref 8–16)
AST SERPL-CCNC: 24 U/L (ref 10–40)
BASOPHILS # BLD AUTO: 0.08 K/UL (ref 0–0.2)
BASOPHILS NFR BLD: 1.1 % (ref 0–1.9)
BILIRUB SERPL-MCNC: 0.5 MG/DL (ref 0.1–1)
BUN SERPL-MCNC: 15 MG/DL (ref 6–20)
CALCIUM SERPL-MCNC: 9.5 MG/DL (ref 8.7–10.5)
CHLORIDE SERPL-SCNC: 105 MMOL/L (ref 95–110)
CO2 SERPL-SCNC: 21 MMOL/L (ref 23–29)
CREAT SERPL-MCNC: 0.9 MG/DL (ref 0.5–1.4)
DIFFERENTIAL METHOD: ABNORMAL
EOSINOPHIL # BLD AUTO: 0.2 K/UL (ref 0–0.5)
EOSINOPHIL NFR BLD: 2.2 % (ref 0–8)
ERYTHROCYTE [DISTWIDTH] IN BLOOD BY AUTOMATED COUNT: 12.2 % (ref 11.5–14.5)
EST. GFR  (AFRICAN AMERICAN): >60 ML/MIN/1.73 M^2
EST. GFR  (NON AFRICAN AMERICAN): >60 ML/MIN/1.73 M^2
GLUCOSE SERPL-MCNC: 206 MG/DL (ref 70–110)
HCT VFR BLD AUTO: 41.8 % (ref 40–54)
HGB BLD-MCNC: 14.5 G/DL (ref 14–18)
IMM GRANULOCYTES # BLD AUTO: 0.02 K/UL (ref 0–0.04)
IMM GRANULOCYTES NFR BLD AUTO: 0.3 % (ref 0–0.5)
LYMPHOCYTES # BLD AUTO: 2 K/UL (ref 1–4.8)
LYMPHOCYTES NFR BLD: 26.7 % (ref 18–48)
MCH RBC QN AUTO: 31.6 PG (ref 27–31)
MCHC RBC AUTO-ENTMCNC: 34.7 G/DL (ref 32–36)
MCV RBC AUTO: 91 FL (ref 82–98)
MONOCYTES # BLD AUTO: 0.6 K/UL (ref 0.3–1)
MONOCYTES NFR BLD: 7.4 % (ref 4–15)
NEUTROPHILS # BLD AUTO: 4.7 K/UL (ref 1.8–7.7)
NEUTROPHILS NFR BLD: 62.3 % (ref 38–73)
NRBC BLD-RTO: 0 /100 WBC
PLATELET # BLD AUTO: 199 K/UL (ref 150–450)
PMV BLD AUTO: 9.3 FL (ref 9.2–12.9)
POTASSIUM SERPL-SCNC: 4.3 MMOL/L (ref 3.5–5.1)
PROT SERPL-MCNC: 7.6 G/DL (ref 6–8.4)
RBC # BLD AUTO: 4.59 M/UL (ref 4.6–6.2)
SODIUM SERPL-SCNC: 136 MMOL/L (ref 136–145)
WBC # BLD AUTO: 7.59 K/UL (ref 3.9–12.7)

## 2022-07-11 PROCEDURE — 3080F PR MOST RECENT DIASTOLIC BLOOD PRESSURE >= 90 MM HG: ICD-10-PCS | Mod: CPTII,,, | Performed by: INTERNAL MEDICINE

## 2022-07-11 PROCEDURE — 3008F PR BODY MASS INDEX (BMI) DOCUMENTED: ICD-10-PCS | Mod: CPTII,,, | Performed by: INTERNAL MEDICINE

## 2022-07-11 PROCEDURE — 3075F PR MOST RECENT SYSTOLIC BLOOD PRESS GE 130-139MM HG: ICD-10-PCS | Mod: CPTII,,, | Performed by: INTERNAL MEDICINE

## 2022-07-11 PROCEDURE — 3066F NEPHROPATHY DOC TX: CPT | Mod: CPTII,,, | Performed by: INTERNAL MEDICINE

## 2022-07-11 PROCEDURE — 3051F HG A1C>EQUAL 7.0%<8.0%: CPT | Mod: CPTII,,, | Performed by: INTERNAL MEDICINE

## 2022-07-11 PROCEDURE — 1160F PR REVIEW ALL MEDS BY PRESCRIBER/CLIN PHARMACIST DOCUMENTED: ICD-10-PCS | Mod: CPTII,,, | Performed by: INTERNAL MEDICINE

## 2022-07-11 PROCEDURE — 99215 OFFICE O/P EST HI 40 MIN: CPT | Mod: PBBFAC,PN | Performed by: INTERNAL MEDICINE

## 2022-07-11 PROCEDURE — 99999 PR PBB SHADOW E&M-EST. PATIENT-LVL V: ICD-10-PCS | Mod: PBBFAC,,, | Performed by: INTERNAL MEDICINE

## 2022-07-11 PROCEDURE — 1160F RVW MEDS BY RX/DR IN RCRD: CPT | Mod: CPTII,,, | Performed by: INTERNAL MEDICINE

## 2022-07-11 PROCEDURE — 3051F PR MOST RECENT HEMOGLOBIN A1C LEVEL 7.0 - < 8.0%: ICD-10-PCS | Mod: CPTII,,, | Performed by: INTERNAL MEDICINE

## 2022-07-11 PROCEDURE — 36415 COLL VENOUS BLD VENIPUNCTURE: CPT | Performed by: INTERNAL MEDICINE

## 2022-07-11 PROCEDURE — 99999 PR PBB SHADOW E&M-EST. PATIENT-LVL V: CPT | Mod: PBBFAC,,, | Performed by: INTERNAL MEDICINE

## 2022-07-11 PROCEDURE — 1159F PR MEDICATION LIST DOCUMENTED IN MEDICAL RECORD: ICD-10-PCS | Mod: CPTII,,, | Performed by: INTERNAL MEDICINE

## 2022-07-11 PROCEDURE — 3061F PR NEG MICROALBUMINURIA RESULT DOCUMENTED/REVIEW: ICD-10-PCS | Mod: CPTII,,, | Performed by: INTERNAL MEDICINE

## 2022-07-11 PROCEDURE — 3075F SYST BP GE 130 - 139MM HG: CPT | Mod: CPTII,,, | Performed by: INTERNAL MEDICINE

## 2022-07-11 PROCEDURE — 3066F PR DOCUMENTATION OF TREATMENT FOR NEPHROPATHY: ICD-10-PCS | Mod: CPTII,,, | Performed by: INTERNAL MEDICINE

## 2022-07-11 PROCEDURE — 80053 COMPREHEN METABOLIC PANEL: CPT | Performed by: INTERNAL MEDICINE

## 2022-07-11 PROCEDURE — 99214 PR OFFICE/OUTPT VISIT, EST, LEVL IV, 30-39 MIN: ICD-10-PCS | Mod: S$PBB,,, | Performed by: INTERNAL MEDICINE

## 2022-07-11 PROCEDURE — 3080F DIAST BP >= 90 MM HG: CPT | Mod: CPTII,,, | Performed by: INTERNAL MEDICINE

## 2022-07-11 PROCEDURE — 3061F NEG MICROALBUMINURIA REV: CPT | Mod: CPTII,,, | Performed by: INTERNAL MEDICINE

## 2022-07-11 PROCEDURE — 99214 OFFICE O/P EST MOD 30 MIN: CPT | Mod: S$PBB,,, | Performed by: INTERNAL MEDICINE

## 2022-07-11 PROCEDURE — 85025 COMPLETE CBC W/AUTO DIFF WBC: CPT | Performed by: INTERNAL MEDICINE

## 2022-07-11 PROCEDURE — 3008F BODY MASS INDEX DOCD: CPT | Mod: CPTII,,, | Performed by: INTERNAL MEDICINE

## 2022-07-11 PROCEDURE — 1159F MED LIST DOCD IN RCRD: CPT | Mod: CPTII,,, | Performed by: INTERNAL MEDICINE

## 2022-07-11 RX ORDER — SUCRALFATE 1 G/1
1 TABLET ORAL
Qty: 90 TABLET | Refills: 11 | Status: SHIPPED | OUTPATIENT
Start: 2022-07-11 | End: 2023-07-06

## 2022-07-11 NOTE — PATIENT INSTRUCTIONS
He will continue his reflux regimen and current medications.  He started on the Carafate.  He will continue his diabetic diet.  Ultrasound and nuclear medicine scan for gastric emptying will be scheduled.  He continues his other medications vitamins and minerals.

## 2022-07-11 NOTE — PROGRESS NOTES
Subjective:       Patient ID: Nick Tam is a 52 y.o. male.    Chief Complaint: Follow-up        Office Visit    8/10/2021  [facility] [place] [facility]    Joey Portillo MD      Gastroenterology Gastroesophageal reflux disease, unspecified whether esophagitis present +6 more      Dx Follow-up ; Referred by Alexander Fisher MD      Reason for Visit      Progress Notes  Joey Portillo MD (Physician)   Gastroenterology       Subjective:      Patient ID: Nick Tam is a 51 y.o. male.     Chief Complaint: Follow-up (GERD)     Upper endoscopy revealed a hiatal hernia gastroesophageal reflux and the biopsies were negative for H pylori.  He has had pyrosis and dyspepsia but he cannot pinpoint a specific food.  He is on his diabetic diet.  He states he is going to see his PCP today who is going to treat his diabetes.  He has tried to make a food diary and avoid the offending foods particularly the fried and fatty foods.  He realizes that he is overweight and is trying to decrease his caloric intake to reduce his weight.  Colonoscopy revealed hemorrhoids.  The diarrhea has resolved.  He is having daily bowel movements.  He has added more fiber to the diet.  He denies fever chills hematemesis hematochezia jaundice or bleeding.        Allergies:    Review of patient's allergies indicates:  Allergen Reactions   Shrimp Shortness Of Breath   Codeine     Pcn (penicillins)          Medications:     Current Outpatient Medications:     albuterol (PROVENTIL/VENTOLIN HFA) 90 mcg/actuation inhaler, Inhale 2 puffs into the lungs every 6 (six) hours. Rescue, Disp: 18 g, Rfl: 0    allopurinoL (ZYLOPRIM) 100 MG tablet, Take 1 tablet (100 mg total) by mouth once daily., Disp: 90 tablet, Rfl: 3    atorvastatin (LIPITOR) 10 MG tablet, Take 1 tablet (10 mg total) by mouth once daily., Disp: 30 tablet, Rfl: 11    buPROPion (WELLBUTRIN XL) 150 MG TB24 tablet, Take 1 tablet (150 mg total) by mouth once daily., Disp: 30  "tablet, Rfl: 11    cloNIDine (CATAPRES) 0.2 MG tablet, Take 1 tablet (0.2 mg total) by mouth once daily., Disp: 30 tablet, Rfl: 5    dicyclomine (BENTYL) 10 MG capsule, TAKE ONE CAPSULE BY MOUTH FOUR TIMES DAILY AS NEEDED THANK YOU, Disp: , Rfl:     liraglutide 0.6 mg/0.1 mL, 18 mg/3 mL, subq PNIJ (VICTOZA 2-JORDON) 0.6 mg/0.1 mL (18 mg/3 mL) PnIj pen, Inject 0.6 mg into the skin once daily., Disp: 3 mL, Rfl: 11    metoprolol succinate (TOPROL-XL) 50 MG 24 hr tablet, Take 1 tablet (50 mg total) by mouth once daily., Disp: 90 tablet, Rfl: 3    nitroGLYCERIN (NITROSTAT) 0.4 MG SL tablet, Place 1 tablet (0.4 mg total) under the tongue every 5 (five) minutes as needed for Chest pain., Disp: 30 tablet, Rfl: 0    pantoprazole (PROTONIX) 40 MG tablet, Take 1 tablet (40 mg total) by mouth 2 (two) times daily., Disp: 60 tablet, Rfl: 11    pen needle, diabetic 32 gauge x 1/4" Ndle, Use daily with Victoza injection., Disp: 90 each, Rfl: 3    SAFE TUSSIN DM  mg/5 mL Liqd, Take 5 mLs by mouth every 4 (four) hours as needed., Disp: 236 mL, Rfl: 0       Past Medical History:  Diagnosis Date   Diabetes mellitus     GERD (gastroesophageal reflux disease)     Gout     Hypertension     Obesity            Past Surgical History:  Procedure Laterality Date   CHOLECYSTECTOMY       COLONOSCOPY N/A 7/28/2021    Procedure: COLONOSCOPY;  Surgeon: Joey Portillo MD;  Location: [facility];  Service: Endoscopy;  Laterality: N/A;   ESOPHAGOGASTRODUODENOSCOPY N/A 7/28/2021    Procedure: EGD (ESOPHAGOGASTRODUODENOSCOPY);  Surgeon: Joey Portillo MD;  Location: [facility];  Service: Endoscopy;  Laterality: N/A;           Review of Systems   Constitutional: Negative for appetite change, fever and unexpected weight change.   HENT: Negative for trouble swallowing.         No jaundice.   Respiratory: Negative for cough, shortness of breath and wheezing.         He has never used tobacco.  Currently is not using tobacco alcohol.  He " denies dysphagia aspiration hemoptysis significant coughing significant sputum production or dyspnea on exertion.  He has occasional episodes of dyspnea that he cannot describe well.  They are not associated with oral intake.  He has a pulmonary inhaler.   Cardiovascular: Negative for chest pain.        He denies exertional chest pain or rhythm disturbance.  He states his hypertension hyperlipidemia are well controlled.   Gastrointestinal: Positive for nausea. Negative for abdominal distention, abdominal pain, anal bleeding, blood in stool, constipation and diarrhea.        He realizes that he is overweight and has a fatty liver.  He realizes the importance of weight reduction but is been extremely difficult to reduce his weight by decreasing his caloric intake.  He has been taking his vitamins including vitamin-E.   Endocrine:        He has tried to stay on the diabetic diet.  He made a food diary and is avoiding the fried and fatty foods.  He realizes he is off her weight.  He has tried to decrease his caloric intake to reduce his weight but is found it extremely difficult.  His family history is positive for diabetes but is otherwise negative for GI disease.   Musculoskeletal: Negative for back pain and neck pain.   Skin: Negative for pallor and rash.   Neurological: Negative for dizziness, seizures, syncope, speech difficulty, weakness and numbness.   Hematological: Negative for adenopathy.   Psychiatric/Behavioral: Negative for confusion.         Objective:  Physical Exam  Vitals reviewed.   Constitutional:       Appearance: He is well-developed.      Comments: Well-nourished well-hydrated afebrile nonicteric overweight white male.  He is normocephalic.  Pupils are normal.  He is sitting comfortably in the chair.  He is breathing normally.  He appears to be oriented x3 and can relate his history and answer questions appropriately.   HENT:      Head: Normocephalic.   Eyes:      Pupils: Pupils are equal, round,  and reactive to light.   Neck:      Thyroid: No thyromegaly.      Trachea: No tracheal deviation.   Cardiovascular:      Rate and Rhythm: Normal rate and regular rhythm.      Heart sounds: Normal heart sounds.   Pulmonary:      Effort: Pulmonary effort is normal.      Breath sounds: Normal breath sounds.   Abdominal:      General: Bowel sounds are normal. There is no distension.      Palpations: Abdomen is soft. There is no mass.      Tenderness: There is no abdominal tenderness. There is no right CVA tenderness, guarding or rebound.      Hernia: No hernia is present.      Comments: The abdomen is soft obese without tenderness masses organomegaly.  Bowel sounds are normal.   Musculoskeletal:         General: Normal range of motion.      Cervical back: Normal range of motion and neck supple.      Comments: He can ambulate normally.  He can go from the sitting the standing position without difficulty.   Lymphadenopathy:      Cervical: No cervical adenopathy.   Skin:     General: Skin is warm and dry.   Neurological:      Mental Status: He is alert and oriented to person, place, and time.      Cranial Nerves: No cranial nerve deficit.   Psychiatric:         Behavior: Behavior normal.               Plan:      Gastroesophageal reflux disease, unspecified whether esophagitis present  -     pantoprazole (PROTONIX) 40 MG tablet; Take 1 tablet (40 mg total) by mouth 2 (two) times daily.  Dispense: 60 tablet; Refill: 11     Belching  -     pantoprazole (PROTONIX) 40 MG tablet; Take 1 tablet (40 mg total) by mouth 2 (two) times daily.  Dispense: 60 tablet; Refill: 11     NAFLD (nonalcoholic fatty liver disease)     Hemorrhoids, unspecified hemorrhoid type     Fatty liver     Hiatal hernia     Morbid obesity, BMI 44.98, today 42.3     He will continue his reflux regimen.  He will increase the Protonix to twice a day.  He will continue his current medications vitamins and minerals.  He continues his diabetic diet.  Weight loss  "would be ideal.  He will try to decrease his caloric intake.  He has morbid obesity.  He may be a candidate for gastric sleeve.  Will add more fiber to the diet to produced daily bowel movements.  He takes his vitamins and minerals.                 Other Notes    All notes        Instructions        Follow up in about 6 months (around 2/10/2022).    Upper endoscopy revealed gastroesophageal reflux.  The biopsies were negative for H pylori.  He will make a food diary and avoid the offending foods but stay on the diabetic diet.  He will increase the Protonix to 40 mg twice a day.  Colonoscopy revealed hemorrhoids.  He will add more fiber to the diet.  He continues his other medications vitamins and minerals.           After Visit Summary (Printed 8/10/2021)      Additional Documentation    Vitals: /84 (BP Location: Right arm, Patient Position: Sitting, BP Method: Large (Automatic))    Pulse 64    Resp 15    Ht 5' 11" (1.803 m)    Wt 137.4 kg (302 lb 14.4 oz) Important      SpO2 97%    BMI 42.25 kg/m²    BSA 2.62 m²    Pain Sc 0-No pain    Flowsheets: Code Vitals,    Anthropometrics      SmartForms:  [facility] AMB - FALL RISK      Encounter Info: Billing Info,    History,    Allergies,    Detailed Report        Communications        Letter sent to Alexander Fisher MD    AMB Visit Summary: Provider Version      Not recorded      All Charges for This Encounter    Code Description Service Date Service Provider Modifiers Qty  93289 UT OFFICE/OUTPT VISIT,EST,LEVL IV 8/10/2021 Joey Portillo MD S$PBB 1  34295959 HC OFFICE/OUTPT VISIT, EST, LEVL IV, 30-39 MIN 8/10/2021 Joey Portillo MD PBBFAC, PN 1  [id number] UT PBB SHADOW E&M-EST. PATIENT-LVL IV 8/10/2021 Joey Portillo MD PBBFAC 1    Level of Service    Level of Service  UT OFFICE/OUTPT VISIT, EST, LEVL IV, 30-39 MIN (34231)    BestPractice Advisories    Click to view BestPractice Advisory history    AVS Reports    Date/Time Report Action User  8/10/2021  9:34 AM After " Visit Summary Printed Mary Vinson MA    Encounter-Level Documents on 08/10/2021:    After Visit Summary - Document on 8/10/2021 9:34 AM by Mary Vinson LPN: After Visit Summary      Orders Placed    None      Medication Changes        pantoprazole sodium 40 mg Oral 2 times daily 40 mg Oral Daily      Medication List    Visit Diagnoses        Gastroesophageal reflux disease, unspecified whether esophagitis present        Belching        NAFLD (nonalcoholic fatty liver disease)        Hemorrhoids, unspecified hemorrhoid type        Fatty liver        Hiatal hernia        Morbid obesity, BMI 44.98, today 42.3      Problem List      He has a history of gastroesophageal reflux and is on the PPI twice a day.  He is having afternoon abdominal discomfort cramping with occasional nausea but denies vomiting.  He denies dysphagia aspiration or GI bleeding.  He cannot pinpoint a precipitating factor.  He has found it extremely difficult to lose weight because he is not physically active.  He has tried to decrease his caloric intake by reducing carbohydrates.  He is trying to stay on the diabetic diet.  He has been taking his vitamins and minerals.  He understands the adverse effects of obesity and the history of fatty liver.  He continues his vitamins and minerals.  He he has nausea.  He does not relate the nausea to a specific food but denies vomiting.  He has complained of abdominal distension.  He generally has 1-2 bowel movements per day.  He denies fever chills.  He has a family history of diabetes.  He has several members the family who had stomach cancer and pancreatic cancer.  He denies hematemesis hematochezia jaundice or bleeding.      Allergies:  Review of patient's allergies indicates:   Allergen Reactions    Shrimp Shortness Of Breath    Codeine     Pcn [penicillins]        Medications:    Current Outpatient Medications:     allopurinoL (ZYLOPRIM) 100 MG tablet, Take 1 tablet (100 mg total) by mouth once  "daily., Disp: 90 tablet, Rfl: 3    atorvastatin (LIPITOR) 10 MG tablet, Take 1 tablet (10 mg total) by mouth once daily., Disp: 90 tablet, Rfl: 3    buPROPion (WELLBUTRIN XL) 150 MG TB24 tablet, Take 1 tablet (150 mg total) by mouth once daily., Disp: 90 tablet, Rfl: 3    cloNIDine (CATAPRES) 0.2 MG tablet, Take 1 tablet (0.2 mg total) by mouth once daily., Disp: 90 tablet, Rfl: 3    dicyclomine (BENTYL) 10 MG capsule, TAKE ONE CAPSULE BY MOUTH FOUR TIMES DAILY **THANK YOU**, Disp: 120 capsule, Rfl: 1    gabapentin (NEURONTIN) 300 MG capsule, Take 1 capsule (300 mg total) by mouth every evening., Disp: 90 capsule, Rfl: 3    liraglutide 0.6 mg/0.1 mL, 18 mg/3 mL, subq PNIJ (VICTOZA 2-JORDON) 0.6 mg/0.1 mL (18 mg/3 mL) PnIj pen, Inject 1.8 mg into the skin once daily., Disp: 27 mL, Rfl: 3    metoprolol succinate (TOPROL-XL) 50 MG 24 hr tablet, Take 1 tablet (50 mg total) by mouth once daily., Disp: 90 tablet, Rfl: 3    nitroGLYCERIN (NITROSTAT) 0.4 MG SL tablet, Place 1 tablet (0.4 mg total) under the tongue every 5 (five) minutes as needed for Chest pain. (Patient not taking: Reported on 3/8/2022), Disp: 30 tablet, Rfl: 0    ondansetron (ZOFRAN-ODT) 4 MG TbDL, Take 1 tablet (4 mg total) by mouth every 8 (eight) hours as needed., Disp: 30 tablet, Rfl: 1    pantoprazole (PROTONIX) 40 MG tablet, Take 1 tablet (40 mg total) by mouth 2 (two) times daily., Disp: 60 tablet, Rfl: 11    pen needle, diabetic 32 gauge x 1/4" Ndle, Use daily with Victoza injection., Disp: 90 each, Rfl: 3    sucralfate (CARAFATE) 1 gram tablet, Take 1 tablet (1 g total) by mouth 3 (three) times daily before meals., Disp: 90 tablet, Rfl: 11    Past Medical History:   Diagnosis Date    Diabetes mellitus     GERD (gastroesophageal reflux disease)     Gout     Hypertension     Obesity        Past Surgical History:   Procedure Laterality Date    CHOLECYSTECTOMY      COLONOSCOPY N/A 7/28/2021    Procedure: COLONOSCOPY;  Surgeon: Joey NUNEZ" MD Bandar;  Location: [facility] END[facility];  Service: Endoscopy;  Laterality: N/A;    ESOPHAGOGASTRODUODENOSCOPY N/A 7/28/2021    Procedure: EGD (ESOPHAGOGASTRODUODENOSCOPY);  Surgeon: Joey Portillo MD;  Location: [facility];  Service: Endoscopy;  Laterality: N/A;         Review of Systems   Constitutional: Negative for appetite change, fever and unexpected weight change.   HENT: Negative for trouble swallowing.         No jaundice.   Respiratory: Negative for cough, shortness of breath and wheezing.         He denies tobacco or alcohol.  He denies dysphagia aspiration hemoptysis chronic cough chronic sputum production or dyspnea on exertion.  He is not physically active.   Cardiovascular: Negative for chest pain.        He denies cardiopulmonary symptoms.  He denies exertional chest pain or rhythm disturbance.  He states his hypertension hyperlipidemia are well controlled.   Gastrointestinal: Positive for abdominal distention and nausea. Negative for abdominal pain, anal bleeding, blood in stool, constipation and diarrhea.        He has daily bowel movements.  He has increase the fiber to his diet and he takes occasional MiraLax.  He has a hiatal hernia and he takes his Protonix twice a day..  He has been taking vitamins and minerals.   Musculoskeletal: Positive for arthralgias, back pain and gait problem. Negative for neck pain.        He ambulates with a cane.  He has gout which she states is well controlled.  He has chronic back and joint symptoms.   Skin: Negative for pallor and rash.   Neurological: Negative for dizziness, seizures, syncope, speech difficulty, weakness and numbness.   Hematological: Negative for adenopathy.   Psychiatric/Behavioral: Negative for confusion.       Objective:      Physical Exam  Vitals reviewed.   Constitutional:       Appearance: He is well-developed.      Comments: Well-nourished well-hydrated afebrile overweight white male.  He is sitting comfortably in the chair.  He is breathing  normally.  He is normocephalic.  Pupils are normal.  He is oriented x3 and can relate his history and answer questions appropriately.   HENT:      Head: Normocephalic.   Eyes:      Pupils: Pupils are equal, round, and reactive to light.   Neck:      Thyroid: No thyromegaly.      Trachea: No tracheal deviation.   Cardiovascular:      Rate and Rhythm: Normal rate and regular rhythm.      Heart sounds: Normal heart sounds.   Pulmonary:      Effort: Pulmonary effort is normal.      Breath sounds: Normal breath sounds.   Abdominal:      General: Bowel sounds are normal. There is no distension.      Palpations: Abdomen is soft. There is no mass.      Tenderness: There is abdominal tenderness. There is no right CVA tenderness, left CVA tenderness, guarding or rebound.      Hernia: No hernia is present.      Comments: The abdomen is soft it is obese with mild tenderness to palpation in the epigastrium.  Organomegaly masses are not detected.  Bowel sounds are normal.   Musculoskeletal:      Cervical back: Normal range of motion and neck supple.      Comments: He ambulates slowly with a cane.  He can go from the sitting to the standing position without difficulty.   Lymphadenopathy:      Cervical: No cervical adenopathy.   Skin:     General: Skin is warm and dry.   Neurological:      Mental Status: He is alert and oriented to person, place, and time.      Cranial Nerves: No cranial nerve deficit.   Psychiatric:         Behavior: Behavior normal.           Plan:       Nausea  -     US Abdomen Complete; Future; Expected date: 07/11/2022  -     NM Gastric Emptying; Future; Expected date: 07/11/2022  -     CBC Auto Differential; Future; Expected date: 07/11/2022  -     Comprehensive Metabolic Panel; Future; Expected date: 07/11/2022  -     sucralfate (CARAFATE) 1 gram tablet; Take 1 tablet (1 g total) by mouth 3 (three) times daily before meals.  Dispense: 90 tablet; Refill: 11    Abdominal pain, unspecified abdominal location  -      US Abdomen Complete; Future; Expected date: 07/11/2022  -     NM Gastric Emptying; Future; Expected date: 07/11/2022  -     CBC Auto Differential; Future; Expected date: 07/11/2022  -     Comprehensive Metabolic Panel; Future; Expected date: 07/11/2022  -     sucralfate (CARAFATE) 1 gram tablet; Take 1 tablet (1 g total) by mouth 3 (three) times daily before meals.  Dispense: 90 tablet; Refill: 11    Belching    Gastroesophageal reflux disease, unspecified whether esophagitis present    Fatty liver    Hiatal hernia    Hemorrhoids, unspecified hemorrhoid type    Morbid obesity, BMI 44.98, today 42.3    He will continue his reflux regimen.  He started on the Carafate before meals.  He continues his current medications vitamins and minerals.  I have discussed obesity with him.  He is trying to stay on the diabetic diet and reduce his weight by decreasing his caloric intake.  He continues his bowel program with additional fiber to produced daily bowel movements.  Ultrasound and nuclear medicine scan for E emptying is scheduled.  He will follow up with his other physicians.

## 2022-07-12 ENCOUNTER — HOSPITAL ENCOUNTER (OUTPATIENT)
Dept: RADIOLOGY | Facility: HOSPITAL | Age: 52
Discharge: HOME | End: 2022-07-12
Attending: INTERNAL MEDICINE
Payer: MEDICAID

## 2022-07-12 DIAGNOSIS — R11.0 NAUSEA: ICD-10-CM

## 2022-07-12 DIAGNOSIS — R10.9 ABDOMINAL PAIN, UNSPECIFIED ABDOMINAL LOCATION: ICD-10-CM

## 2022-07-12 PROCEDURE — 76700 US EXAM ABDOM COMPLETE: CPT | Mod: TC

## 2022-07-12 PROCEDURE — 76700 US EXAM ABDOM COMPLETE: CPT | Mod: 26,,, | Performed by: RADIOLOGY

## 2022-07-12 PROCEDURE — 76700 US ABDOMEN COMPLETE: ICD-10-PCS | Mod: 26,,, | Performed by: RADIOLOGY

## 2022-07-25 ENCOUNTER — OFFICE VISIT (OUTPATIENT)
Dept: CARDIOLOGY | Facility: CLINIC | Age: 52
End: 2022-07-25
Payer: MEDICAID

## 2022-07-25 VITALS
BODY MASS INDEX: 49.44 KG/M2 | RESPIRATION RATE: 18 BRPM | HEART RATE: 80 BPM | HEIGHT: 67 IN | OXYGEN SATURATION: 95 % | DIASTOLIC BLOOD PRESSURE: 86 MMHG | SYSTOLIC BLOOD PRESSURE: 134 MMHG | WEIGHT: 315 LBS

## 2022-07-25 DIAGNOSIS — I10 ESSENTIAL HYPERTENSION: ICD-10-CM

## 2022-07-25 DIAGNOSIS — Z82.49 FAMILY HISTORY OF PREMATURE CAD: ICD-10-CM

## 2022-07-25 DIAGNOSIS — E11.9 TYPE 2 DIABETES MELLITUS WITHOUT COMPLICATION, WITHOUT LONG-TERM CURRENT USE OF INSULIN: ICD-10-CM

## 2022-07-25 DIAGNOSIS — R07.2 PRECORDIAL PAIN: Primary | ICD-10-CM

## 2022-07-25 DIAGNOSIS — G47.33 OSA ON CPAP: ICD-10-CM

## 2022-07-25 DIAGNOSIS — G47.19 EXCESSIVE DAYTIME SLEEPINESS: ICD-10-CM

## 2022-07-25 DIAGNOSIS — Z55.0 LITERACY LEVEL OF ILLITERATE: ICD-10-CM

## 2022-07-25 DIAGNOSIS — U07.1 COVID-19: ICD-10-CM

## 2022-07-25 DIAGNOSIS — E66.01 CLASS 3 SEVERE OBESITY DUE TO EXCESS CALORIES WITH SERIOUS COMORBIDITY AND BODY MASS INDEX (BMI) OF 50.0 TO 59.9 IN ADULT: ICD-10-CM

## 2022-07-25 DIAGNOSIS — K76.0 FATTY LIVER: ICD-10-CM

## 2022-07-25 DIAGNOSIS — R14.2 BELCHING: ICD-10-CM

## 2022-07-25 DIAGNOSIS — Z91.89 SEDENTARY LIFESTYLE: ICD-10-CM

## 2022-07-25 DIAGNOSIS — Z91.89 CARDIOVASCULAR EVENT RISK: ICD-10-CM

## 2022-07-25 PROBLEM — E66.813 CLASS 3 SEVERE OBESITY DUE TO EXCESS CALORIES WITH SERIOUS COMORBIDITY AND BODY MASS INDEX (BMI) OF 50.0 TO 59.9 IN ADULT: Status: ACTIVE | Noted: 2021-05-28

## 2022-07-25 PROCEDURE — 1159F MED LIST DOCD IN RCRD: CPT | Mod: CPTII,,, | Performed by: INTERNAL MEDICINE

## 2022-07-25 PROCEDURE — 3066F PR DOCUMENTATION OF TREATMENT FOR NEPHROPATHY: ICD-10-PCS | Mod: CPTII,,, | Performed by: INTERNAL MEDICINE

## 2022-07-25 PROCEDURE — 3051F HG A1C>EQUAL 7.0%<8.0%: CPT | Mod: CPTII,,, | Performed by: INTERNAL MEDICINE

## 2022-07-25 PROCEDURE — 3079F PR MOST RECENT DIASTOLIC BLOOD PRESSURE 80-89 MM HG: ICD-10-PCS | Mod: CPTII,,, | Performed by: INTERNAL MEDICINE

## 2022-07-25 PROCEDURE — 3075F SYST BP GE 130 - 139MM HG: CPT | Mod: CPTII,,, | Performed by: INTERNAL MEDICINE

## 2022-07-25 PROCEDURE — 99999 PR PBB SHADOW E&M-EST. PATIENT-LVL III: ICD-10-PCS | Mod: PBBFAC,,, | Performed by: INTERNAL MEDICINE

## 2022-07-25 PROCEDURE — 1159F PR MEDICATION LIST DOCUMENTED IN MEDICAL RECORD: ICD-10-PCS | Mod: CPTII,,, | Performed by: INTERNAL MEDICINE

## 2022-07-25 PROCEDURE — 3051F PR MOST RECENT HEMOGLOBIN A1C LEVEL 7.0 - < 8.0%: ICD-10-PCS | Mod: CPTII,,, | Performed by: INTERNAL MEDICINE

## 2022-07-25 PROCEDURE — 3079F DIAST BP 80-89 MM HG: CPT | Mod: CPTII,,, | Performed by: INTERNAL MEDICINE

## 2022-07-25 PROCEDURE — 93010 EKG 12-LEAD: ICD-10-PCS | Mod: S$PBB,,, | Performed by: INTERNAL MEDICINE

## 2022-07-25 PROCEDURE — 3008F BODY MASS INDEX DOCD: CPT | Mod: CPTII,,, | Performed by: INTERNAL MEDICINE

## 2022-07-25 PROCEDURE — 3061F NEG MICROALBUMINURIA REV: CPT | Mod: CPTII,,, | Performed by: INTERNAL MEDICINE

## 2022-07-25 PROCEDURE — 3066F NEPHROPATHY DOC TX: CPT | Mod: CPTII,,, | Performed by: INTERNAL MEDICINE

## 2022-07-25 PROCEDURE — 99999 PR PBB SHADOW E&M-EST. PATIENT-LVL III: CPT | Mod: PBBFAC,,, | Performed by: INTERNAL MEDICINE

## 2022-07-25 PROCEDURE — 3061F PR NEG MICROALBUMINURIA RESULT DOCUMENTED/REVIEW: ICD-10-PCS | Mod: CPTII,,, | Performed by: INTERNAL MEDICINE

## 2022-07-25 PROCEDURE — 99215 PR OFFICE/OUTPT VISIT, EST, LEVL V, 40-54 MIN: ICD-10-PCS | Mod: S$PBB,25,, | Performed by: INTERNAL MEDICINE

## 2022-07-25 PROCEDURE — 99215 OFFICE O/P EST HI 40 MIN: CPT | Mod: S$PBB,25,, | Performed by: INTERNAL MEDICINE

## 2022-07-25 PROCEDURE — 93010 ELECTROCARDIOGRAM REPORT: CPT | Mod: S$PBB,,, | Performed by: INTERNAL MEDICINE

## 2022-07-25 PROCEDURE — 3075F PR MOST RECENT SYSTOLIC BLOOD PRESS GE 130-139MM HG: ICD-10-PCS | Mod: CPTII,,, | Performed by: INTERNAL MEDICINE

## 2022-07-25 PROCEDURE — 93005 ELECTROCARDIOGRAM TRACING: CPT | Mod: PBBFAC | Performed by: INTERNAL MEDICINE

## 2022-07-25 PROCEDURE — 99213 OFFICE O/P EST LOW 20 MIN: CPT | Mod: PBBFAC | Performed by: INTERNAL MEDICINE

## 2022-07-25 PROCEDURE — 3008F PR BODY MASS INDEX (BMI) DOCUMENTED: ICD-10-PCS | Mod: CPTII,,, | Performed by: INTERNAL MEDICINE

## 2022-07-25 SDOH — SOCIAL DETERMINANTS OF HEALTH (SDOH): ILITERACY AND LOW LEVEL LITERACY: Z55.0

## 2022-07-25 NOTE — PROGRESS NOTES
Subjective:    Patient ID:  Nick Tam is a 52 y.o. male who presents for evaluation of Annual Exam   For not controlled T2DM on liraglutide, recurrent atypical CP  GI: Dr. MURALI Portillo  PCP: Alexander Fisher MD  Prior cardiologist; Dr. 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4 (H) 06/10/2022       Lab Results   Component Value Date    WBC 7.59 07/11/2022    HGB 14.5 07/11/2022    HCT 41.8 07/11/2022    MCV 91 07/11/2022     07/11/2022       CMP  Sodium   Date Value Ref Range Status   07/11/2022 136 136 - 145 mmol/L Final     Potassium   Date Value Ref Range Status   07/11/2022 4.3 3.5 - 5.1 mmol/L Final     Chloride   Date Value Ref Range Status   07/11/2022 105 95 - 110 mmol/L Final     CO2   Date Value Ref Range Status   07/11/2022 21 (L) 23 - 29 mmol/L Final     Glucose   Date Value Ref Range Status   07/11/2022 206 (H) 70 - 110 mg/dL Final     BUN   Date Value Ref Range Status   07/11/2022 15 6 - 20 mg/dL Final     Creatinine   Date Value Ref Range Status   07/11/2022 0.9 0.5 - 1.4 mg/dL Final     Calcium   Date Value Ref Range Status   07/11/2022 9.5 8.7 - 10.5 mg/dL Final     Total Protein   Date Value Ref Range Status   07/11/2022 7.6 6.0 - 8.4 g/dL Final     Albumin   Date Value Ref Range Status    07/11/2022 3.7 3.5 - 5.2 g/dL Final     Total Bilirubin   Date Value Ref Range Status   07/11/2022 0.5 0.1 - 1.0 mg/dL Final     Comment:     For infants and newborns, interpretation of results should be based  on gestational age, weight and in agreement with clinical  observations.    Premature Infant recommended reference ranges:  Up to 24 hours.............<8.0 mg/dL  Up to 48 hours............<12.0 mg/dL  3-5 days..................<15.0 mg/dL  6-29 days.................<15.0 mg/dL       Alkaline Phosphatase   Date Value Ref Range Status   07/11/2022 95 55 - 135 U/L Final     AST   Date Value Ref Range Status   07/11/2022 24 10 - 40 U/L Final     ALT   Date Value Ref Range Status   07/11/2022 33 10 - 44 U/L Final     Anion Gap   Date Value Ref Range Status   07/11/2022 10 8 - 16 mmol/L Final     eGFR if    Date Value Ref Range Status   07/11/2022 >60.0 >60 mL/min/1.73 m^2 Final     eGFR if non    Date Value Ref Range Status   07/11/2022 >60.0 >60 mL/min/1.73 m^2 Final     Comment:     Calculation used to obtain the estimated glomerular filtration  rate (eGFR) is the CKD-EPI equation.        @labrcntip(troponini)@    BNP   Date Value Ref Range Status   06/10/2021 <10 0 - 99 pg/mL Final     Comment:     Values of less than 100 pg/ml are consistent with non-CHF populations.   }   Lab Results   Component Value Date    CHOL 153 06/10/2022    CHOL 173 05/26/2021     Lab Results   Component Value Date    HDL 49 06/10/2022    HDL 55 05/26/2021     Lab Results   Component Value Date    LDLCALC 77.2 06/10/2022    LDLCALC 97.0 05/26/2021     Lab Results   Component Value Date    TRIG 134 06/10/2022    TRIG 105 05/26/2021     Lab Results   Component Value Date    CHOLHDL 32.0 06/10/2022    CHOLHDL 31.8 05/26/2021 5/2021 iron sat 30%, ferritin 118, negative for microalbuminuria    Last Echo: DSE 6/2021  Last stress test: 6/2021  Cardiovascular angiogram: none  ECG: NSR, rate 78, left  "axis and low anterior forces.  Fundoscopic exam: annually, no retinopathy    In 5/2021:  WM referred for recurrent atypical CP, no clear inciting factor, happens about every week. Feels like stabbing with knives. Lasted up to half hour and stopped 2-3 minutes after NTG. No prior history of heart disease and had single office visit with Dr. Aguayo, but no insurance. Mother had MI at age 64. Father with stroke at an old age. Overall ASCVD 10-year event risk of 8.7%.    ED noted "50 y.o. male who presents complaining of chest pain.  Started about an hour ago he was walking over to see his mother.  Lasted for about 15 min until paramedics gave him nitroglycerin and full-dose aspirin.  Pain is starting to come back a little bit now.  It is in the center of his chest and feels more like a stabbing sensation.  Denies any history of coronary disease.  He does have some increased shortness of breath today over his baseline.  He has history of COPD."    CXR - Cardiomediastinal silhouette is within normal limits     In 7/2021, referred for pre-GI endoscopy clearance. Able to lose 20 lbs with Victoza, off metformin. Sleep study scheduled for 7/30/2021. Still with occasional atypical CP.  DSE - The left ventricle is normal in size with concentric remodeling and normal systolic function.  · The estimated ejection fraction is 55%.  · Normal left ventricular diastolic function.  · Small pericardial effusion.  · The patient reached the end of the protocol.  · During stress, the following significant arrhythmias were observed: occasional PACs, occasional PVCs.  · Normal right ventricular size with normal right ventricular systolic function.  · The ECG portion of this study is negative for myocardial ischemia.  · Mild left atrial enlargement.  · The stress echo portion of this study is negative for myocardial ischemia.  · Patient experienced coughing, shaking with tremor during dobutamine infusion.   Difficult windows, Lumason contrast " used for wall motion analysis.  HPI comments: in 7/2022, return for annual review. Continue with occasional atypical CP about once a week, feel sticking with knives, last up to 20 minutes and helped with NTG, only took it twice over this past year.  US Abdomen 7/2022 - liver is mildly enlarged measuring 20.0 cm. Parenchyma demonstrates a heterogeneous pattern of increased echogenicity consistent with diffuse fatty infiltration.   Review of Systems   Constitutional: Positive for diaphoresis, malaise/fatigue and weight gain (up 26 lbs from 7/2021). Negative for fever, night sweats and weight loss.   HENT: Positive for congestion and ear pain. Negative for nosebleeds and tinnitus.    Eyes: Positive for blurred vision and redness. Negative for visual disturbance.   Cardiovascular: Positive for chest pain, dyspnea on exertion and leg swelling. Negative for claudication, cyanosis, irregular heartbeat, near-syncope, orthopnea and palpitations.   Respiratory: Positive for cough, shortness of breath, sleep disturbances due to breathing and snoring. Negative for wheezing.         [place] score 20 today a 15, have JORDAN and using night time CPAP 100%, Sister and both parents had JORDAN   Endocrine: Positive for polydipsia, polyphagia and polyuria.   Hematologic/Lymphatic: Does not bruise/bleed easily.   Skin: Positive for color change, itching, poor wound healing and rash. Negative for flushing, nail changes and suspicious lesions.   Musculoskeletal: Positive for back pain and muscle cramps. Negative for arthritis, falls, gout, joint pain, joint swelling, muscle weakness and myalgias.   Gastrointestinal: Positive for bloating, abdominal pain, excessive appetite, flatus (with belching), heartburn and nausea. Negative for hematemesis, hematochezia and melena.   Genitourinary: Positive for bladder incontinence.   Neurological: Positive for excessive daytime sleepiness, dizziness, headaches, light-headedness and loss of balance.  "Negative for disturbances in coordination, focal weakness, numbness, vertigo and weakness.   Psychiatric/Behavioral: Negative for depression and substance abuse. The patient is nervous/anxious. The patient does not have insomnia.         Objective:    Physical Exam  Constitutional:       Appearance: He is well-developed.      Comments: RA O2 sat 95%   HENT:      Head: Normocephalic.   Eyes:      Conjunctiva/sclera: Conjunctivae normal.      Pupils: Pupils are equal, round, and reactive to light.   Neck:      Thyroid: No thyromegaly.      Vascular: No JVD.      Comments: Circumference 21"  Cardiovascular:      Rate and Rhythm: Normal rate and regular rhythm.      Pulses: Intact distal pulses.           Carotid pulses are 1+ on the right side and 1+ on the left side.       Radial pulses are 1+ on the right side and 1+ on the left side.        Dorsalis pedis pulses are 0 on the right side and 0 on the left side.        Posterior tibial pulses are 0 on the right side and 0 on the left side.      Heart sounds: Heart sounds are distant. No murmur heard.    No friction rub. No gallop.   Pulmonary:      Effort: Pulmonary effort is normal.      Breath sounds: No rales.      Comments: Diminished breath sounds    Chest:      Chest wall: No tenderness.   Abdominal:      General: Bowel sounds are normal.      Palpations: Abdomen is soft.      Tenderness: There is no abdominal tenderness.      Comments: Waist 54" up to 56", hip 49",   incessant belching during examination.   Musculoskeletal:         General: Normal range of motion.      Cervical back: Normal range of motion and neck supple.   Lymphadenopathy:      Cervical: No cervical adenopathy.   Skin:     General: Skin is warm and dry.      Findings: No rash.   Neurological:      Mental Status: He is alert and oriented to person, place, and time.           Assessment:       1. Precordial pain    2. Fatty liver    3. COVID-19, 6/2021, no residual problem    4. Sedentary " lifestyle, onset 2018    5. Cardiovascular event risk, ASCVD 10-year risk 8.7%, 2021    6. Essential hypertension, onset before 1987    7. Family history of premature CAD    8. JORDAN on CPAP, 100% use    9. Class 3 severe obesity due to excess calories with serious comorbidity and body mass index (BMI) of 50.0 to 59.9 in adult    10. Belching, chronic    11. Excessive daytime sleepiness    12. Literacy level of illiterate    13. Type 2 diabetes mellitus without complication, without long-term current use of insulin         Plan:       Nick was seen today for annual exam.    Diagnoses and all orders for this visit:    Precordial pain  -     IN OFFICE EKG 12-LEAD (to Muse)    Fatty liver    COVID-19, 6/2021, no residual problem    Sedentary lifestyle, onset 2018    Cardiovascular event risk, ASCVD 10-year risk 8.7%, 2021    Essential hypertension, onset before 1987    Family history of premature CAD    JORDAN on CPAP, 100% use    Class 3 severe obesity due to excess calories with serious comorbidity and body mass index (BMI) of 50.0 to 59.9 in adult    Belching, chronic    Excessive daytime sleepiness    Literacy level of illiterate    Type 2 diabetes mellitus without complication, without long-term current use of insulin    - All medical issues reviewed, continue current Rx.  - Warning signs of MI and stroke given, would chew 2-4 low-dose ASA (81 mg) if symptom last more than 5 minutes and call 911.  - CBT, cognitive behavior therapy for sleep disorder.  - CV status and all medications reviewed, patient acknowledge good understanding.  - Recommend healthy living: no nicotine, moderate alcohol, healthy diet and regular exercise aiming for fitness, restorative sleep and weight control  - Need good exercise program, 4 key elements: 1. Aerobic (walking, swimming, dancing, jogging, biking, etc, 2. Muscle strengthening / resistance exercise, need to do 2-3 times weekly, 3. Stretching daily, good stretch takes a whole  total  minute. 4. Balance exercise daily.  - Encourage activities as much as tolerated. Any activity is better than none!   - Instruction for Mediterranean diet and heart healthy exercise given.  - Check home blood pressure, 2 days weekly, do 2 readings within 5 minutes in AM and PM, keep log for review. Target resting BP is less than 130/85.  - Weigh twice weekly, try to lose 1-2 lbs per week. Target weight loss of 5%-10%.  - Highly recommend 30-60 minutes of exercise / activities daily, can have Sunday off, with 2-3 sessions of muscle strengthening weekly. A  would be very helpful.  - Recommend at least annual cardiovascular evaluation in view of patient's significant risk factors.  - Need to have family member accompany patient at each doctor visit. Bring all active medication in a bag for review.  - Phone review / encourage use of [facility]    Greater than 50% of the time was spent in counseling and coordination of care. The above assessment and plan have been discussed at length. Referring provider's note reviewed. Labs and procedure over the last 6 months reviewed. Problem List updated. Asked to bring in all active medications / pills bottles with next visit.

## 2022-08-08 ENCOUNTER — OFFICE VISIT (OUTPATIENT)
Dept: GASTROENTEROLOGY | Facility: CLINIC | Age: 52
End: 2022-08-08
Payer: MEDICAID

## 2022-08-08 VITALS
SYSTOLIC BLOOD PRESSURE: 140 MMHG | HEART RATE: 72 BPM | WEIGHT: 315 LBS | HEIGHT: 71 IN | OXYGEN SATURATION: 100 % | TEMPERATURE: 99 F | BODY MASS INDEX: 44.1 KG/M2 | DIASTOLIC BLOOD PRESSURE: 74 MMHG

## 2022-08-08 DIAGNOSIS — E65 OBESE ABDOMEN: ICD-10-CM

## 2022-08-08 DIAGNOSIS — K21.9 GASTROESOPHAGEAL REFLUX DISEASE, UNSPECIFIED WHETHER ESOPHAGITIS PRESENT: ICD-10-CM

## 2022-08-08 DIAGNOSIS — R11.0 NAUSEA: ICD-10-CM

## 2022-08-08 DIAGNOSIS — K44.9 HIATAL HERNIA: ICD-10-CM

## 2022-08-08 DIAGNOSIS — E66.01 CLASS 3 SEVERE OBESITY DUE TO EXCESS CALORIES WITH SERIOUS COMORBIDITY AND BODY MASS INDEX (BMI) OF 50.0 TO 59.9 IN ADULT: ICD-10-CM

## 2022-08-08 DIAGNOSIS — K76.0 FATTY LIVER: ICD-10-CM

## 2022-08-08 DIAGNOSIS — R14.2 BELCHING: ICD-10-CM

## 2022-08-08 DIAGNOSIS — K64.9 HEMORRHOIDS, UNSPECIFIED HEMORRHOID TYPE: ICD-10-CM

## 2022-08-08 DIAGNOSIS — R10.9 ABDOMINAL PAIN, UNSPECIFIED ABDOMINAL LOCATION: Primary | ICD-10-CM

## 2022-08-08 PROCEDURE — 3008F PR BODY MASS INDEX (BMI) DOCUMENTED: ICD-10-PCS | Mod: CPTII,,, | Performed by: INTERNAL MEDICINE

## 2022-08-08 PROCEDURE — 99214 OFFICE O/P EST MOD 30 MIN: CPT | Mod: PBBFAC,PN | Performed by: INTERNAL MEDICINE

## 2022-08-08 PROCEDURE — 3066F NEPHROPATHY DOC TX: CPT | Mod: CPTII,,, | Performed by: INTERNAL MEDICINE

## 2022-08-08 PROCEDURE — 1159F PR MEDICATION LIST DOCUMENTED IN MEDICAL RECORD: ICD-10-PCS | Mod: CPTII,,, | Performed by: INTERNAL MEDICINE

## 2022-08-08 PROCEDURE — 99999 PR PBB SHADOW E&M-EST. PATIENT-LVL IV: ICD-10-PCS | Mod: PBBFAC,,, | Performed by: INTERNAL MEDICINE

## 2022-08-08 PROCEDURE — 3061F PR NEG MICROALBUMINURIA RESULT DOCUMENTED/REVIEW: ICD-10-PCS | Mod: CPTII,,, | Performed by: INTERNAL MEDICINE

## 2022-08-08 PROCEDURE — 3078F DIAST BP <80 MM HG: CPT | Mod: CPTII,,, | Performed by: INTERNAL MEDICINE

## 2022-08-08 PROCEDURE — 3051F PR MOST RECENT HEMOGLOBIN A1C LEVEL 7.0 - < 8.0%: ICD-10-PCS | Mod: CPTII,,, | Performed by: INTERNAL MEDICINE

## 2022-08-08 PROCEDURE — 1159F MED LIST DOCD IN RCRD: CPT | Mod: CPTII,,, | Performed by: INTERNAL MEDICINE

## 2022-08-08 PROCEDURE — 1160F PR REVIEW ALL MEDS BY PRESCRIBER/CLIN PHARMACIST DOCUMENTED: ICD-10-PCS | Mod: CPTII,,, | Performed by: INTERNAL MEDICINE

## 2022-08-08 PROCEDURE — 99214 PR OFFICE/OUTPT VISIT, EST, LEVL IV, 30-39 MIN: ICD-10-PCS | Mod: S$PBB,,, | Performed by: INTERNAL MEDICINE

## 2022-08-08 PROCEDURE — 3008F BODY MASS INDEX DOCD: CPT | Mod: CPTII,,, | Performed by: INTERNAL MEDICINE

## 2022-08-08 PROCEDURE — 3077F PR MOST RECENT SYSTOLIC BLOOD PRESSURE >= 140 MM HG: ICD-10-PCS | Mod: CPTII,,, | Performed by: INTERNAL MEDICINE

## 2022-08-08 PROCEDURE — 99214 OFFICE O/P EST MOD 30 MIN: CPT | Mod: S$PBB,,, | Performed by: INTERNAL MEDICINE

## 2022-08-08 PROCEDURE — 1160F RVW MEDS BY RX/DR IN RCRD: CPT | Mod: CPTII,,, | Performed by: INTERNAL MEDICINE

## 2022-08-08 PROCEDURE — 99999 PR PBB SHADOW E&M-EST. PATIENT-LVL IV: CPT | Mod: PBBFAC,,, | Performed by: INTERNAL MEDICINE

## 2022-08-08 PROCEDURE — 3078F PR MOST RECENT DIASTOLIC BLOOD PRESSURE < 80 MM HG: ICD-10-PCS | Mod: CPTII,,, | Performed by: INTERNAL MEDICINE

## 2022-08-08 PROCEDURE — 3066F PR DOCUMENTATION OF TREATMENT FOR NEPHROPATHY: ICD-10-PCS | Mod: CPTII,,, | Performed by: INTERNAL MEDICINE

## 2022-08-08 PROCEDURE — 3051F HG A1C>EQUAL 7.0%<8.0%: CPT | Mod: CPTII,,, | Performed by: INTERNAL MEDICINE

## 2022-08-08 PROCEDURE — 3077F SYST BP >= 140 MM HG: CPT | Mod: CPTII,,, | Performed by: INTERNAL MEDICINE

## 2022-08-08 PROCEDURE — 3061F NEG MICROALBUMINURIA REV: CPT | Mod: CPTII,,, | Performed by: INTERNAL MEDICINE

## 2022-08-08 NOTE — PROGRESS NOTES
Subjective:       Patient ID: Nick Tam is a 52 y.o. male.    Chief Complaint: Gastroesophageal Reflux    He has a history of gastroesophageal reflux and a fatty liver.  He has experience pyrosis and dyspepsia.  The ultrasound shows a fatty liver and a cholecystectomy.  He has had nausea.  He has tried to make a food diary and avoid the offending foods.  He is not on his diabetic diet.  He denies dysphagia.  He realizes he is overweight and he has tried reduce his weight by decreasing his caloric intake.  He generally has daily bowel movements.  He has frequent belching.  The Carafate has  helped him.  He lives alone and  generally eats sandwiches.  He is not on the diabetic diet.  He states his Victoza was increased in dosage and he believes that his diabetes is better controlled although he does not have a monitor to check his glucose at home.  He denies dysphagia aspiration.  He has has frequent belching or regurgitation.  He realizes that he is overweight.  He has tried to decrease his caloric intake but he states he can be more physically active.  He denies dysphagia jaundice or bleeding.  He states the [facility] could not perform his gastric study because he was overweight.      Allergies:  Review of patient's allergies indicates:   Allergen Reactions    Shrimp Shortness Of Breath    Codeine     Pcn [penicillins]        Medications:    Current Outpatient Medications:     allopurinoL (ZYLOPRIM) 100 MG tablet, Take 1 tablet (100 mg total) by mouth once daily., Disp: 90 tablet, Rfl: 3    atorvastatin (LIPITOR) 10 MG tablet, Take 1 tablet (10 mg total) by mouth once daily., Disp: 90 tablet, Rfl: 3    buPROPion (WELLBUTRIN XL) 150 MG TB24 tablet, Take 1 tablet (150 mg total) by mouth once daily., Disp: 90 tablet, Rfl: 3    cloNIDine (CATAPRES) 0.2 MG tablet, Take 1 tablet (0.2 mg total) by mouth once daily., Disp: 90 tablet, Rfl: 3    dicyclomine (BENTYL) 10 MG capsule, TAKE ONE CAPSULE BY  "MOUTH FOUR TIMES DAILY **THANK YOU**, Disp: 120 capsule, Rfl: 1    gabapentin (NEURONTIN) 300 MG capsule, Take 1 capsule (300 mg total) by mouth every evening., Disp: 90 capsule, Rfl: 3    liraglutide 0.6 mg/0.1 mL, 18 mg/3 mL, subq PNIJ (VICTOZA 2-JORDON) 0.6 mg/0.1 mL (18 mg/3 mL) PnIj pen, Inject 1.8 mg into the skin once daily., Disp: 27 mL, Rfl: 3    metoprolol succinate (TOPROL-XL) 50 MG 24 hr tablet, Take 1 tablet (50 mg total) by mouth once daily., Disp: 90 tablet, Rfl: 3    ondansetron (ZOFRAN-ODT) 4 MG TbDL, Take 1 tablet (4 mg total) by mouth every 8 (eight) hours as needed., Disp: 30 tablet, Rfl: 1    pantoprazole (PROTONIX) 40 MG tablet, Take 1 tablet (40 mg total) by mouth 2 (two) times daily., Disp: 60 tablet, Rfl: 11    pen needle, diabetic 32 gauge x 1/4" Ndle, Use daily with Victoza injection., Disp: 90 each, Rfl: 3    sucralfate (CARAFATE) 1 gram tablet, Take 1 tablet (1 g total) by mouth 3 (three) times daily before meals., Disp: 90 tablet, Rfl: 11    nitroGLYCERIN (NITROSTAT) 0.4 MG SL tablet, Place 1 tablet (0.4 mg total) under the tongue every 5 (five) minutes as needed for Chest pain. (Patient not taking: Reported on 3/8/2022), Disp: 30 tablet, Rfl: 0    Past Medical History:   Diagnosis Date    Diabetes mellitus     GERD (gastroesophageal reflux disease)     Gout     Hypertension     Obesity        Past Surgical History:   Procedure Laterality Date    CHOLECYSTECTOMY      COLONOSCOPY N/A 7/28/2021    Procedure: COLONOSCOPY;  Surgeon: Joey Portillo MD;  Location: [facility];  Service: Endoscopy;  Laterality: N/A;    ESOPHAGOGASTRODUODENOSCOPY N/A 7/28/2021    Procedure: EGD (ESOPHAGOGASTRODUODENOSCOPY);  Surgeon: Joey Portillo MD;  Location: [facility];  Service: Endoscopy;  Laterality: N/A;         Review of Systems   Constitutional: Negative for appetite change, fever and unexpected weight change.   HENT: Negative for trouble swallowing.         No jaundice.   Respiratory: " Negative for cough, shortness of breath and wheezing.         He has never been a cigarette smoker.  Currently does not use tobacco consume alcohol.  He denies dysphagia aspiration hemoptysis chronic cough chronic sputum production or dyspnea on exertion.   Cardiovascular: Negative for chest pain.        He denies cardiopulmonary symptoms such as exertional chest pain or rhythm disturbance.  He states his hypertension and hyperlipidemia are well controlled.   Gastrointestinal: Positive for abdominal pain and nausea. Negative for abdominal distention, anal bleeding, blood in stool, constipation, diarrhea, rectal pain and vomiting.   Endocrine:        He states he is not on the correct dose of Victoza.  He has diabetes been treated by his PCP.  He does not have a glucose monitor at home.  He states that he lives alone and generally eats sandwiches.  He is not on the diabetic diet.  He will discuss diabetic teaching with his PCP.  He probably will need to have a monitor.   Musculoskeletal: Positive for arthralgias and gait problem. Negative for back pain and neck pain.        Twenty-four years ago he injured his left ankle.  He ambulates with a cane.  He is not interested in seeing an orthopedist.  He thinks his gout is well controlled.   Skin: Negative for pallor and rash.   Neurological: Negative for dizziness, seizures, syncope, speech difficulty, weakness and numbness.   Hematological: Negative for adenopathy.   Psychiatric/Behavioral: Negative for confusion.       Objective:      Physical Exam  Vitals reviewed.   Constitutional:       Appearance: He is well-developed.      Comments: Well-nourished well-hydrated afebrile nonicteric white male.  He is sitting comfortably in the chair in breathing normally.  He appears to be oriented x3 and can relate his history and answer questions appropriately.  He is normocephalic.  Pupils are normal.   HENT:      Head: Normocephalic.   Eyes:      Pupils: Pupils are equal,  round, and reactive to light.   Neck:      Thyroid: No thyromegaly.      Trachea: No tracheal deviation.   Cardiovascular:      Rate and Rhythm: Normal rate and regular rhythm.      Heart sounds: Normal heart sounds.   Pulmonary:      Effort: Pulmonary effort is normal.      Breath sounds: Normal breath sounds.   Abdominal:      General: Bowel sounds are normal. There is no distension.      Palpations: Abdomen is soft. There is no mass.      Tenderness: There is abdominal tenderness. There is no right CVA tenderness, left CVA tenderness, guarding or rebound.      Hernia: No hernia is present.      Comments: The abdomen is soft it is obese with mild tenderness to palpation in the epigastrium.  Organomegaly or masses are not detected.  Bowel sounds are normal.   Musculoskeletal:      Cervical back: Normal range of motion and neck supple.      Comments: She ambulates slowly with a cane.  He can go from the sitting the standing position without difficulty.   Lymphadenopathy:      Cervical: No cervical adenopathy.   Skin:     General: Skin is warm and dry.   Neurological:      Mental Status: He is alert and oriented to person, place, and time.      Cranial Nerves: No cranial nerve deficit.   Psychiatric:         Behavior: Behavior normal.           Plan:       Abdominal pain, unspecified abdominal location    Nausea    Hemorrhoids, unspecified hemorrhoid type    Hiatal hernia    Fatty liver    Gastroesophageal reflux disease, unspecified whether esophagitis present    Belching, chronic    Class 3 severe obesity due to excess calories with serious comorbidity and body mass index (BMI) of 50.0 to 59.9 in adult    Obese abdomen    He will continue his reflux regimen and the Protonix and Carafate.  He will try to stay on the diabetic diet and avoid the offending foods especially the fried in the fatty foods.  His diabetes is being treated by his PCP.  He will discuss nutritional counseling and referral for diabetic teaching  with his PCP.  He continues his bowel program with additional fiber to produced daily bowel movements.  He continues the MiraLax.  The nuclear medicine scan will be rescheduled.  He was scheduled for an esophageal motility study.  I have encouraged weight reduction.  He realizes the adverse effects of obesity.  He has a fatty liver and he is taking his vitamins and minerals.  He is trying to decrease his caloric intake to reduce his weight.  He follows up with his other physicians.

## 2022-08-08 NOTE — PATIENT INSTRUCTIONS
He will be scheduled for an esophageal motility study for the reflux symptoms and belching.  Additionally his nuclear medicine scan for gastric emptying will be scheduled at [facility].  He will try to stay on the diabetic diet and follow up with his other physicians and continue his vitamins and minerals.

## 2022-08-19 ENCOUNTER — TELEPHONE (OUTPATIENT)
Dept: GASTROENTEROLOGY | Facility: CLINIC | Age: 52
End: 2022-08-19
Payer: MEDICAID

## 2022-08-19 NOTE — TELEPHONE ENCOUNTER
----- Message from Verena Biggs sent at 8/19/2022 12:49 PM CDT -----  Type: Patient Call Back         Who called:Pt          What is the request in detail: Pt called in regarding wanting  [facility] test [facility] ?         Can the clinic reply by [facility]?no          Would the patient rather a call back or a response via [facility]?call back          Best call back number:.[phone] (mobile)          Additional Information:           Thank You

## 2022-08-19 NOTE — TELEPHONE ENCOUNTER
Spoke with patient. Advised [employer] Patient verbalized understanding.

## 2022-09-01 DIAGNOSIS — R14.2 BELCHING: ICD-10-CM

## 2022-09-01 DIAGNOSIS — K21.9 GASTROESOPHAGEAL REFLUX DISEASE, UNSPECIFIED WHETHER ESOPHAGITIS PRESENT: ICD-10-CM

## 2022-09-01 RX ORDER — PANTOPRAZOLE SODIUM 40 MG/1
TABLET, DELAYED RELEASE ORAL
Qty: 60 TABLET | Refills: 11 | Status: SHIPPED | OUTPATIENT
Start: 2022-09-01 | End: 2023-09-11 | Stop reason: SDUPTHER

## 2022-11-09 ENCOUNTER — TELEPHONE (OUTPATIENT)
Dept: FAMILY MEDICINE | Facility: CLINIC | Age: 52
End: 2022-11-09
Payer: MEDICAID

## 2022-11-09 NOTE — TELEPHONE ENCOUNTER
----- Message from Frances Oakley sent at 11/9/2022  1:25 PM CST -----  Contact: Patient  Type:  Sooner Apoointment Request      Name of Caller: Patient     When is the first available appointment? 1/3    Symptoms: pain in  left arm     Would the patient rather a call back or a response via [facility]? Call     Best Call Back Number:[phone] (home)      Additional Information:

## 2022-11-09 NOTE — TELEPHONE ENCOUNTER
Attempted to contact Nick Tam to discuss Scheduling an appointment.    Left voice mail to return our call at [phone] on [phone] (home).    Lorri Mccollum LPN

## 2022-12-13 ENCOUNTER — OFFICE VISIT (OUTPATIENT)
Dept: FAMILY MEDICINE | Facility: CLINIC | Age: 52
End: 2022-12-13
Payer: MEDICAID

## 2022-12-13 ENCOUNTER — LAB VISIT (OUTPATIENT)
Dept: LAB | Facility: HOSPITAL | Age: 52
End: 2022-12-13
Attending: FAMILY MEDICINE
Payer: MEDICAID

## 2022-12-13 VITALS
HEART RATE: 78 BPM | SYSTOLIC BLOOD PRESSURE: 134 MMHG | OXYGEN SATURATION: 98 % | HEIGHT: 71 IN | WEIGHT: 315 LBS | BODY MASS INDEX: 44.1 KG/M2 | RESPIRATION RATE: 16 BRPM | TEMPERATURE: 98 F | DIASTOLIC BLOOD PRESSURE: 82 MMHG

## 2022-12-13 DIAGNOSIS — R05.3 CHRONIC COUGH: ICD-10-CM

## 2022-12-13 DIAGNOSIS — K30 DELAYED GASTRIC EMPTYING: ICD-10-CM

## 2022-12-13 DIAGNOSIS — R11.0 NAUSEA: ICD-10-CM

## 2022-12-13 DIAGNOSIS — E11.42 TYPE 2 DIABETES MELLITUS WITH DIABETIC POLYNEUROPATHY, WITHOUT LONG-TERM CURRENT USE OF INSULIN: Primary | ICD-10-CM

## 2022-12-13 DIAGNOSIS — E11.42 TYPE 2 DIABETES MELLITUS WITH DIABETIC POLYNEUROPATHY, WITHOUT LONG-TERM CURRENT USE OF INSULIN: ICD-10-CM

## 2022-12-13 DIAGNOSIS — K21.9 GASTROESOPHAGEAL REFLUX DISEASE, UNSPECIFIED WHETHER ESOPHAGITIS PRESENT: ICD-10-CM

## 2022-12-13 LAB
ALBUMIN SERPL BCP-MCNC: 3.7 G/DL (ref 3.5–5.2)
ALP SERPL-CCNC: 113 U/L (ref 55–135)
ALT SERPL W/O P-5'-P-CCNC: 30 U/L (ref 10–44)
ANION GAP SERPL CALC-SCNC: 11 MMOL/L (ref 8–16)
AST SERPL-CCNC: 22 U/L (ref 10–40)
BILIRUB SERPL-MCNC: 0.6 MG/DL (ref 0.1–1)
BUN SERPL-MCNC: 17 MG/DL (ref 6–20)
CALCIUM SERPL-MCNC: 9.7 MG/DL (ref 8.7–10.5)
CHLORIDE SERPL-SCNC: 99 MMOL/L (ref 95–110)
CO2 SERPL-SCNC: 24 MMOL/L (ref 23–29)
CREAT SERPL-MCNC: 1 MG/DL (ref 0.5–1.4)
EST. GFR  (NO RACE VARIABLE): >60 ML/MIN/1.73 M^2
ESTIMATED AVG GLUCOSE: 194 MG/DL (ref 68–131)
GLUCOSE SERPL-MCNC: 236 MG/DL (ref 70–110)
HBA1C MFR BLD: 8.4 % (ref 4–5.6)
POTASSIUM SERPL-SCNC: 4.1 MMOL/L (ref 3.5–5.1)
PROT SERPL-MCNC: 8 G/DL (ref 6–8.4)
SODIUM SERPL-SCNC: 134 MMOL/L (ref 136–145)

## 2022-12-13 PROCEDURE — 3008F BODY MASS INDEX DOCD: CPT | Mod: CPTII,,, | Performed by: FAMILY MEDICINE

## 2022-12-13 PROCEDURE — 3061F NEG MICROALBUMINURIA REV: CPT | Mod: CPTII,,, | Performed by: FAMILY MEDICINE

## 2022-12-13 PROCEDURE — 1159F MED LIST DOCD IN RCRD: CPT | Mod: CPTII,,, | Performed by: FAMILY MEDICINE

## 2022-12-13 PROCEDURE — 99214 OFFICE O/P EST MOD 30 MIN: CPT | Mod: S$PBB,,, | Performed by: FAMILY MEDICINE

## 2022-12-13 PROCEDURE — 99999 PR PBB SHADOW E&M-EST. PATIENT-LVL V: CPT | Mod: PBBFAC,,, | Performed by: FAMILY MEDICINE

## 2022-12-13 PROCEDURE — 3079F DIAST BP 80-89 MM HG: CPT | Mod: CPTII,,, | Performed by: FAMILY MEDICINE

## 2022-12-13 PROCEDURE — 3079F PR MOST RECENT DIASTOLIC BLOOD PRESSURE 80-89 MM HG: ICD-10-PCS | Mod: CPTII,,, | Performed by: FAMILY MEDICINE

## 2022-12-13 PROCEDURE — 3061F PR NEG MICROALBUMINURIA RESULT DOCUMENTED/REVIEW: ICD-10-PCS | Mod: CPTII,,, | Performed by: FAMILY MEDICINE

## 2022-12-13 PROCEDURE — 1159F PR MEDICATION LIST DOCUMENTED IN MEDICAL RECORD: ICD-10-PCS | Mod: CPTII,,, | Performed by: FAMILY MEDICINE

## 2022-12-13 PROCEDURE — 3051F PR MOST RECENT HEMOGLOBIN A1C LEVEL 7.0 - < 8.0%: ICD-10-PCS | Mod: CPTII,,, | Performed by: FAMILY MEDICINE

## 2022-12-13 PROCEDURE — 1160F PR REVIEW ALL MEDS BY PRESCRIBER/CLIN PHARMACIST DOCUMENTED: ICD-10-PCS | Mod: CPTII,,, | Performed by: FAMILY MEDICINE

## 2022-12-13 PROCEDURE — 1160F RVW MEDS BY RX/DR IN RCRD: CPT | Mod: CPTII,,, | Performed by: FAMILY MEDICINE

## 2022-12-13 PROCEDURE — 99215 OFFICE O/P EST HI 40 MIN: CPT | Mod: PBBFAC | Performed by: FAMILY MEDICINE

## 2022-12-13 PROCEDURE — 3066F PR DOCUMENTATION OF TREATMENT FOR NEPHROPATHY: ICD-10-PCS | Mod: CPTII,,, | Performed by: FAMILY MEDICINE

## 2022-12-13 PROCEDURE — 80053 COMPREHEN METABOLIC PANEL: CPT | Performed by: FAMILY MEDICINE

## 2022-12-13 PROCEDURE — 99214 PR OFFICE/OUTPT VISIT, EST, LEVL IV, 30-39 MIN: ICD-10-PCS | Mod: S$PBB,,, | Performed by: FAMILY MEDICINE

## 2022-12-13 PROCEDURE — 99999 PR PBB SHADOW E&M-EST. PATIENT-LVL V: ICD-10-PCS | Mod: PBBFAC,,, | Performed by: FAMILY MEDICINE

## 2022-12-13 PROCEDURE — 3008F PR BODY MASS INDEX (BMI) DOCUMENTED: ICD-10-PCS | Mod: CPTII,,, | Performed by: FAMILY MEDICINE

## 2022-12-13 PROCEDURE — 3066F NEPHROPATHY DOC TX: CPT | Mod: CPTII,,, | Performed by: FAMILY MEDICINE

## 2022-12-13 PROCEDURE — 36415 COLL VENOUS BLD VENIPUNCTURE: CPT | Performed by: FAMILY MEDICINE

## 2022-12-13 PROCEDURE — 3075F SYST BP GE 130 - 139MM HG: CPT | Mod: CPTII,,, | Performed by: FAMILY MEDICINE

## 2022-12-13 PROCEDURE — 3051F HG A1C>EQUAL 7.0%<8.0%: CPT | Mod: CPTII,,, | Performed by: FAMILY MEDICINE

## 2022-12-13 PROCEDURE — 83036 HEMOGLOBIN GLYCOSYLATED A1C: CPT | Performed by: FAMILY MEDICINE

## 2022-12-13 PROCEDURE — 3075F PR MOST RECENT SYSTOLIC BLOOD PRESS GE 130-139MM HG: ICD-10-PCS | Mod: CPTII,,, | Performed by: FAMILY MEDICINE

## 2022-12-13 RX ORDER — ALBUTEROL SULFATE 90 UG/1
2 AEROSOL, METERED RESPIRATORY (INHALATION) EVERY 6 HOURS PRN
Qty: 18 G | Refills: 0 | Status: SHIPPED | OUTPATIENT
Start: 2022-12-13 | End: 2023-12-13

## 2022-12-13 RX ORDER — ONDANSETRON 4 MG/1
4 TABLET, ORALLY DISINTEGRATING ORAL EVERY 8 HOURS PRN
Qty: 30 TABLET | Refills: 1 | Status: SHIPPED | OUTPATIENT
Start: 2022-12-13 | End: 2024-01-25 | Stop reason: SDUPTHER

## 2022-12-13 NOTE — PROGRESS NOTES
[facility] - Clinic Note    Subjective      Mr. Tam is a 52 y.o. male who presents to clinic for Follow-up (6mth follow up/refills)    In regard to the patient's diabetes, patient reports that blood sugars have been Good control. Patient denies hypoglycemia. Patient is not currently on insulin therapy. Patient is not on ACE/ARB and is on statin. Last a1c was   Hemoglobin A1C   Date Value Ref Range Status   06/10/2022 7.4 (H) 4.0 - 5.6 % Final     Comment:     ADA Screening Guidelines:  5.7-6.4%  Consistent with prediabetes  >or=6.5%  Consistent with diabetes    High levels of fetal hemoglobin interfere with the HbA1C  assay. Heterozygous hemoglobin variants (HbS, HgC, etc)do  not significantly interfere with this assay.   However, presence of multiple variants may affect accuracy.     12/07/2021 6.8 (H) 4.0 - 5.6 % Final     Comment:     ADA Screening Guidelines:  5.7-6.4%  Consistent with prediabetes  >or=6.5%  Consistent with diabetes    High levels of fetal hemoglobin interfere with the HbA1C  assay. Heterozygous hemoglobin variants (HbS, HgC, etc)do  not significantly interfere with this assay.   However, presence of multiple variants may affect accuracy.     09/07/2021 7.0 (H) 4.0 - 5.6 % Final     Comment:     ADA Screening Guidelines:  5.7-6.4%  Consistent with prediabetes  >or=6.5%  Consistent with diabetes    High levels of fetal hemoglobin interfere with the HbA1C  assay. Heterozygous hemoglobin variants (HbS, HgC, etc)do  not significantly interfere with this assay.   However, presence of multiple variants may affect accuracy.       PMH Nick has a past medical history of Diabetes mellitus, GERD (gastroesophageal reflux disease), Gout, Hypertension, and Obesity.   PSXH Nick has a past surgical history that includes Cholecystectomy; Colonoscopy (N/A, 7/28/2021); and Esophagogastroduodenoscopy (N/A, 7/28/2021).    Nick's family history is not on file.    Nick reports that he has never  "smoked. He has never used smokeless tobacco. He reports that he does not currently use alcohol. He reports that he does not use drugs.   SANDIP Romero is allergic to shrimp, codeine, and pcn [penicillins].   LEMUEL Romero has a current medication list which includes the following prescription(s): allopurinol, atorvastatin, bupropion, clonidine, dicyclomine, gabapentin, liraglutide 0.6 mg/0.1 ml (18 mg/3 ml) subq pnij, metoprolol succinate, sucralfate, unifine pentips, albuterol, nitroglycerin, ondansetron, and pantoprazole.     Review of Systems   Constitutional:  Negative for chills and fever.   Respiratory:  Negative for shortness of breath.    Cardiovascular:  Negative for chest pain.   Gastrointestinal:  Positive for nausea.   Objective     /82 (BP Location: Left arm, Patient Position: Sitting, BP Method: X-Large (Manual))   Pulse 78   Temp 97.8 °F (36.6 °C) (Temporal)   Resp 16   Ht 5' 11" (1.803 m)   Wt (!) 145.2 kg (320 lb 3.2 oz)   SpO2 98%   BMI 44.66 kg/m²     Physical Exam  Vitals and nursing note reviewed.   Constitutional:       General: He is not in acute distress.     Appearance: Normal appearance. He is well-developed. He is not diaphoretic.   HENT:      Head: Normocephalic and atraumatic.      Right Ear: External ear normal.      Left Ear: External ear normal.   Eyes:      General:         Right eye: No discharge.         Left eye: No discharge.   Cardiovascular:      Rate and Rhythm: Normal rate and regular rhythm.      Heart sounds: Normal heart sounds.   Pulmonary:      Effort: Pulmonary effort is normal.      Breath sounds: Normal breath sounds. No wheezing or rales.   Skin:     General: Skin is warm and dry.   Neurological:      Mental Status: He is alert and oriented to person, place, and time. Mental status is at baseline.   Psychiatric:         Mood and Affect: Mood normal.         Behavior: Behavior normal.         Thought Content: Thought content normal.         Judgment: Judgment " normal.      Assessment/Plan     1. Type 2 diabetes mellitus with diabetic polyneuropathy, without long-term current use of insulin  Hemoglobin A1C    Comprehensive Metabolic Panel      2. Nausea  ondansetron (ZOFRAN-ODT) 4 MG TbDL      3. Chronic cough  albuterol (VENTOLIN HFA) 90 mcg/actuation inhaler      4. Gastroesophageal reflux disease, unspecified whether esophagitis present  Ambulatory referral/consult to Gastroenterology      5. Delayed gastric emptying  Ambulatory referral/consult to Gastroenterology        Due for labs and microalbumin.  Referral to GI has been placed to evaluate delayed gastric emptying..  Zofran as needed for nausea. Follow-up in 6 months.    Future Appointments   Date Time Provider Department Center   12/13/2022  9:40 AM [facility], LABORATORY [facility]   12/13/2022 10:00 AM Alexander Fisher MD [facility]   6/13/2023 10:00 AM Alexander Fisher MD [facility]         Alexander Fisher MD  Family Medicine  [facility]

## 2022-12-21 DIAGNOSIS — E11.42 TYPE 2 DIABETES MELLITUS WITH DIABETIC POLYNEUROPATHY, WITHOUT LONG-TERM CURRENT USE OF INSULIN: Primary | ICD-10-CM

## 2022-12-21 RX ORDER — DAPAGLIFLOZIN 5 MG/1
5 TABLET, FILM COATED ORAL DAILY
Qty: 30 TABLET | Refills: 11 | Status: SHIPPED | OUTPATIENT
Start: 2022-12-21 | End: 2024-01-07

## 2022-12-22 ENCOUNTER — TELEPHONE (OUTPATIENT)
Dept: FAMILY MEDICINE | Facility: CLINIC | Age: 52
End: 2022-12-22
Payer: MEDICAID

## 2022-12-23 ENCOUNTER — PATIENT MESSAGE (OUTPATIENT)
Dept: FAMILY MEDICINE | Facility: CLINIC | Age: 52
End: 2022-12-23
Payer: MEDICAID

## 2022-12-28 ENCOUNTER — OFFICE VISIT (OUTPATIENT)
Dept: FAMILY MEDICINE | Facility: CLINIC | Age: 52
End: 2022-12-28
Payer: MEDICAID

## 2022-12-28 VITALS
TEMPERATURE: 98 F | RESPIRATION RATE: 14 BRPM | SYSTOLIC BLOOD PRESSURE: 136 MMHG | DIASTOLIC BLOOD PRESSURE: 84 MMHG | HEART RATE: 88 BPM | OXYGEN SATURATION: 98 % | HEIGHT: 71 IN | BODY MASS INDEX: 44.1 KG/M2 | WEIGHT: 315 LBS

## 2022-12-28 DIAGNOSIS — E11.42 TYPE 2 DIABETES MELLITUS WITH DIABETIC POLYNEUROPATHY, WITHOUT LONG-TERM CURRENT USE OF INSULIN: ICD-10-CM

## 2022-12-28 DIAGNOSIS — J40 BRONCHITIS: Primary | ICD-10-CM

## 2022-12-28 PROCEDURE — 3052F HG A1C>EQUAL 8.0%<EQUAL 9.0%: CPT | Mod: CPTII,,, | Performed by: FAMILY MEDICINE

## 2022-12-28 PROCEDURE — 3008F BODY MASS INDEX DOCD: CPT | Mod: CPTII,,, | Performed by: FAMILY MEDICINE

## 2022-12-28 PROCEDURE — 99999 PR PBB SHADOW E&M-EST. PATIENT-LVL IV: ICD-10-PCS | Mod: PBBFAC,,, | Performed by: FAMILY MEDICINE

## 2022-12-28 PROCEDURE — 1160F PR REVIEW ALL MEDS BY PRESCRIBER/CLIN PHARMACIST DOCUMENTED: ICD-10-PCS | Mod: CPTII,,, | Performed by: FAMILY MEDICINE

## 2022-12-28 PROCEDURE — 3079F PR MOST RECENT DIASTOLIC BLOOD PRESSURE 80-89 MM HG: ICD-10-PCS | Mod: CPTII,,, | Performed by: FAMILY MEDICINE

## 2022-12-28 PROCEDURE — 1159F PR MEDICATION LIST DOCUMENTED IN MEDICAL RECORD: ICD-10-PCS | Mod: CPTII,,, | Performed by: FAMILY MEDICINE

## 2022-12-28 PROCEDURE — 3061F PR NEG MICROALBUMINURIA RESULT DOCUMENTED/REVIEW: ICD-10-PCS | Mod: CPTII,,, | Performed by: FAMILY MEDICINE

## 2022-12-28 PROCEDURE — 99214 OFFICE O/P EST MOD 30 MIN: CPT | Mod: S$PBB,,, | Performed by: FAMILY MEDICINE

## 2022-12-28 PROCEDURE — 1159F MED LIST DOCD IN RCRD: CPT | Mod: CPTII,,, | Performed by: FAMILY MEDICINE

## 2022-12-28 PROCEDURE — 3075F SYST BP GE 130 - 139MM HG: CPT | Mod: CPTII,,, | Performed by: FAMILY MEDICINE

## 2022-12-28 PROCEDURE — 1160F RVW MEDS BY RX/DR IN RCRD: CPT | Mod: CPTII,,, | Performed by: FAMILY MEDICINE

## 2022-12-28 PROCEDURE — 99214 PR OFFICE/OUTPT VISIT, EST, LEVL IV, 30-39 MIN: ICD-10-PCS | Mod: S$PBB,,, | Performed by: FAMILY MEDICINE

## 2022-12-28 PROCEDURE — 99999 PR PBB SHADOW E&M-EST. PATIENT-LVL IV: CPT | Mod: PBBFAC,,, | Performed by: FAMILY MEDICINE

## 2022-12-28 PROCEDURE — 3066F NEPHROPATHY DOC TX: CPT | Mod: CPTII,,, | Performed by: FAMILY MEDICINE

## 2022-12-28 PROCEDURE — 99214 OFFICE O/P EST MOD 30 MIN: CPT | Mod: PBBFAC,25 | Performed by: FAMILY MEDICINE

## 2022-12-28 PROCEDURE — 3079F DIAST BP 80-89 MM HG: CPT | Mod: CPTII,,, | Performed by: FAMILY MEDICINE

## 2022-12-28 PROCEDURE — 3061F NEG MICROALBUMINURIA REV: CPT | Mod: CPTII,,, | Performed by: FAMILY MEDICINE

## 2022-12-28 PROCEDURE — 3008F PR BODY MASS INDEX (BMI) DOCUMENTED: ICD-10-PCS | Mod: CPTII,,, | Performed by: FAMILY MEDICINE

## 2022-12-28 PROCEDURE — 96372 THER/PROPH/DIAG INJ SC/IM: CPT | Mod: PBBFAC

## 2022-12-28 PROCEDURE — 3052F PR MOST RECENT HEMOGLOBIN A1C LEVEL 8.0 - < 9.0%: ICD-10-PCS | Mod: CPTII,,, | Performed by: FAMILY MEDICINE

## 2022-12-28 PROCEDURE — 3066F PR DOCUMENTATION OF TREATMENT FOR NEPHROPATHY: ICD-10-PCS | Mod: CPTII,,, | Performed by: FAMILY MEDICINE

## 2022-12-28 PROCEDURE — 3075F PR MOST RECENT SYSTOLIC BLOOD PRESS GE 130-139MM HG: ICD-10-PCS | Mod: CPTII,,, | Performed by: FAMILY MEDICINE

## 2022-12-28 RX ORDER — DEXAMETHASONE SODIUM PHOSPHATE 4 MG/ML
4 INJECTION, SOLUTION INTRA-ARTICULAR; INTRALESIONAL; INTRAMUSCULAR; INTRAVENOUS; SOFT TISSUE
Status: COMPLETED | OUTPATIENT
Start: 2022-12-28 | End: 2022-12-28

## 2022-12-28 RX ORDER — INSULIN PUMP SYRINGE, 3 ML
EACH MISCELLANEOUS
Qty: 1 EACH | Refills: 3 | Status: SHIPPED | OUTPATIENT
Start: 2022-12-28 | End: 2023-12-28

## 2022-12-28 RX ORDER — LANCETS
EACH MISCELLANEOUS
Qty: 200 EACH | Refills: 3 | Status: SHIPPED | OUTPATIENT
Start: 2022-12-28

## 2022-12-28 RX ADMIN — DEXAMETHASONE SODIUM PHOSPHATE 4 MG: 4 INJECTION, SOLUTION INTRA-ARTICULAR; INTRALESIONAL; INTRAMUSCULAR; INTRAVENOUS; SOFT TISSUE at 03:12

## 2022-12-28 NOTE — PROGRESS NOTES
"[facility] - Clinic Note    Subjective      Mr. Tam is a 52 y.o. male who presents to clinic for Cough    Patient reports that for the last week or so he has had a persistent cough.  It has been productive of a whitish phlegm.  Denies any fever or chills.    PMGINA Romero has a past medical history of Diabetes mellitus, GERD (gastroesophageal reflux disease), Gout, Hypertension, and Obesity.   PSXH Nick has a past surgical history that includes Cholecystectomy; Colonoscopy (N/A, 7/28/2021); and Esophagogastroduodenoscopy (N/A, 7/28/2021).    Nick's family history is not on file.   SHAI Romero reports that he has never smoked. He has never used smokeless tobacco. He reports that he does not currently use alcohol. He reports that he does not use drugs.   SANDIP Romero is allergic to shrimp, codeine, and pcn [penicillins].   LEMUEL Romero has a current medication list which includes the following prescription(s): albuterol, allopurinol, atorvastatin, bupropion, clonidine, dapagliflozin, dicyclomine, gabapentin, liraglutide 0.6 mg/0.1 ml (18 mg/3 ml) subq pnij, metoprolol succinate, ondansetron, pantoprazole, sucralfate, unifine pentips, blood sugar diagnostic, blood-glucose meter, lancets, and nitroglycerin, and the following Facility-Administered Medications: dexamethasone.     Review of Systems   Constitutional:  Negative for chills and fever.   Respiratory:  Positive for cough.    Cardiovascular:  Negative for chest pain.   Objective     /84 (BP Location: Left arm, Patient Position: Sitting, BP Method: Large (Manual))   Pulse 88   Temp 98.1 °F (36.7 °C) (Temporal)   Resp 14   Ht 5' 11" (1.803 m)   Wt (!) 145.2 kg (320 lb)   SpO2 98%   BMI 44.63 kg/m²     Physical Exam  Vitals and nursing note reviewed.   Constitutional:       General: He is not in acute distress.     Appearance: He is well-developed.   HENT:      Head: Normocephalic and atraumatic.      Right Ear: Tympanic membrane, ear canal and " external ear normal.      Left Ear: Tympanic membrane, ear canal and external ear normal.      Nose: Mucosal edema present.   Eyes:      General:         Right eye: No discharge.         Left eye: No discharge.   Cardiovascular:      Rate and Rhythm: Normal rate and regular rhythm.      Heart sounds: Normal heart sounds.   Pulmonary:      Effort: Pulmonary effort is normal.      Breath sounds: Normal breath sounds. No wheezing or rales.   Skin:     General: Skin is warm and dry.   Neurological:      Mental Status: He is alert.      Assessment/Plan     1. Bronchitis  dexAMETHasone injection 4 mg      2. Type 2 diabetes mellitus with diabetic polyneuropathy, without long-term current use of insulin  blood-glucose meter kit    lancets Misc    blood sugar diagnostic Strp        Symptoms consistent with bronchitis.  Treat with Decadron injection today.  Monitor blood sugars.  Supportive care with fluids, rest, cough syrup as needed.    Future Appointments   Date Time Provider Department Center   6/13/2023 10:00 AM Alexander Fisher MD [facility]         Alexander Fisher MD  Family Medicine  [facility]

## 2023-03-01 DIAGNOSIS — E11.9 TYPE 2 DIABETES MELLITUS WITHOUT COMPLICATION, WITHOUT LONG-TERM CURRENT USE OF INSULIN: ICD-10-CM

## 2023-03-01 NOTE — TELEPHONE ENCOUNTER
----- Message from Mercedes Valencia MA sent at 3/1/2023 10:57 AM CST -----  Contact: Arabella [phone]  Type: Needs Medical Advice  Who Called:  Arabella [phone]    Pharmacy name and phone #:    [facility] [place] - [address]  [address]  [place] [address]  Phone: [phone] Fax: [phone]      Best Call Back Number: Arabella [phone]  Additional Information: Patient needs refills on the following meds: Farxiga and Victoza. Please call back arabella to confirm these RX have been sent,. Thanks!

## 2023-03-03 DIAGNOSIS — E11.42 TYPE 2 DIABETES MELLITUS WITH DIABETIC POLYNEUROPATHY, WITHOUT LONG-TERM CURRENT USE OF INSULIN: Primary | ICD-10-CM

## 2023-03-03 RX ORDER — DAPAGLIFLOZIN 5 MG/1
5 TABLET, FILM COATED ORAL DAILY
Qty: 30 TABLET | Refills: 1 | Status: CANCELLED | OUTPATIENT
Start: 2023-03-03

## 2023-03-09 ENCOUNTER — PATIENT OUTREACH (OUTPATIENT)
Dept: ADMINISTRATIVE | Facility: HOSPITAL | Age: 53
End: 2023-03-09
Payer: MEDICAID

## 2023-03-09 ENCOUNTER — PATIENT MESSAGE (OUTPATIENT)
Dept: ADMINISTRATIVE | Facility: HOSPITAL | Age: 53
End: 2023-03-09
Payer: MEDICAID

## 2023-03-09 DIAGNOSIS — Z13.1 DIABETES MELLITUS SCREENING: Primary | ICD-10-CM

## 2023-03-09 NOTE — PROGRESS NOTES
Population health chart review while working a Diabetic A1c non-controlled report. Attempted to reach pt by phone, no answer, left VM, sending portal/letter outreach at this time.      Pt returned call and the phone room scheduled his lab over a month late. Spoke with pt and he was ok scheduling when due. Rescheduled non fasting lab at [facility] for 03/13/2023 at 1000am, order linked and appt confirmed.

## 2023-03-13 ENCOUNTER — LAB VISIT (OUTPATIENT)
Dept: LAB | Facility: HOSPITAL | Age: 53
End: 2023-03-13
Attending: FAMILY MEDICINE
Payer: MEDICAID

## 2023-03-13 DIAGNOSIS — Z13.1 DIABETES MELLITUS SCREENING: ICD-10-CM

## 2023-03-13 LAB
ESTIMATED AVG GLUCOSE: 148 MG/DL (ref 68–131)
HBA1C MFR BLD: 6.8 % (ref 4–5.6)

## 2023-03-13 PROCEDURE — 36415 COLL VENOUS BLD VENIPUNCTURE: CPT | Performed by: FAMILY MEDICINE

## 2023-03-13 PROCEDURE — 83036 HEMOGLOBIN GLYCOSYLATED A1C: CPT | Performed by: FAMILY MEDICINE

## 2023-03-20 ENCOUNTER — PATIENT OUTREACH (OUTPATIENT)
Dept: ADMINISTRATIVE | Facility: HOSPITAL | Age: 53
End: 2023-03-20
Payer: MEDICAID

## 2023-03-20 NOTE — LETTER
March 20, 2023    Nick Tam  [address] [place] [address]             [facility]  [address]  [facility] [address]  Phone: [phone] Dear Nick:     [facility] wants to help patients achieve their health goals. Our records show that you may have a diabetes diagnosis.     Diabetes is a medical condition that needs to be managed all the time to reduce your risk of things like heart, kidney and eye disease. Your [facility] primary care team wants to be sure you get important tests and screenings done regularly to assure that your health needs are met.       Our records indicate you are overdue for the following:             Topic    Hemoglobin A1C        PLEASE CALL [phone] TO SCHEDULE.      If you completed these tests at a Non[facility] location, please notify your primary care team, respond directly through this message, or you can upload a copy of your labs to your [facility] portal.           Sincerely,     Alexander Fisher MD and your [facility] Team

## 2023-03-20 NOTE — PROGRESS NOTES
"Attempted to outreach patient via "Househappy", no answer after a week. Sending outreach via Mail Out Letter now.  "

## 2023-04-21 ENCOUNTER — OFFICE VISIT (OUTPATIENT)
Dept: FAMILY MEDICINE | Facility: CLINIC | Age: 53
End: 2023-04-21
Payer: MEDICAID

## 2023-04-21 VITALS
BODY MASS INDEX: 42.47 KG/M2 | RESPIRATION RATE: 18 BRPM | HEART RATE: 89 BPM | WEIGHT: 303.38 LBS | DIASTOLIC BLOOD PRESSURE: 80 MMHG | OXYGEN SATURATION: 97 % | TEMPERATURE: 98 F | SYSTOLIC BLOOD PRESSURE: 126 MMHG | HEIGHT: 71 IN

## 2023-04-21 DIAGNOSIS — E11.42 TYPE 2 DIABETES MELLITUS WITH DIABETIC POLYNEUROPATHY, WITHOUT LONG-TERM CURRENT USE OF INSULIN: Primary | ICD-10-CM

## 2023-04-21 PROCEDURE — 99999 PR PBB SHADOW E&M-EST. PATIENT-LVL V: CPT | Mod: PBBFAC,,, | Performed by: FAMILY MEDICINE

## 2023-04-21 PROCEDURE — 1159F MED LIST DOCD IN RCRD: CPT | Mod: CPTII,,, | Performed by: FAMILY MEDICINE

## 2023-04-21 PROCEDURE — 3008F PR BODY MASS INDEX (BMI) DOCUMENTED: ICD-10-PCS | Mod: CPTII,,, | Performed by: FAMILY MEDICINE

## 2023-04-21 PROCEDURE — 99213 PR OFFICE/OUTPT VISIT, EST, LEVL III, 20-29 MIN: ICD-10-PCS | Mod: S$PBB,,, | Performed by: FAMILY MEDICINE

## 2023-04-21 PROCEDURE — 3044F PR MOST RECENT HEMOGLOBIN A1C LEVEL <7.0%: ICD-10-PCS | Mod: CPTII,,, | Performed by: FAMILY MEDICINE

## 2023-04-21 PROCEDURE — 99999 PR PBB SHADOW E&M-EST. PATIENT-LVL V: ICD-10-PCS | Mod: PBBFAC,,, | Performed by: FAMILY MEDICINE

## 2023-04-21 PROCEDURE — 3074F SYST BP LT 130 MM HG: CPT | Mod: CPTII,,, | Performed by: FAMILY MEDICINE

## 2023-04-21 PROCEDURE — 1159F PR MEDICATION LIST DOCUMENTED IN MEDICAL RECORD: ICD-10-PCS | Mod: CPTII,,, | Performed by: FAMILY MEDICINE

## 2023-04-21 PROCEDURE — 3079F PR MOST RECENT DIASTOLIC BLOOD PRESSURE 80-89 MM HG: ICD-10-PCS | Mod: CPTII,,, | Performed by: FAMILY MEDICINE

## 2023-04-21 PROCEDURE — 99215 OFFICE O/P EST HI 40 MIN: CPT | Mod: PBBFAC | Performed by: FAMILY MEDICINE

## 2023-04-21 PROCEDURE — 3044F HG A1C LEVEL LT 7.0%: CPT | Mod: CPTII,,, | Performed by: FAMILY MEDICINE

## 2023-04-21 PROCEDURE — 1160F RVW MEDS BY RX/DR IN RCRD: CPT | Mod: CPTII,,, | Performed by: FAMILY MEDICINE

## 2023-04-21 PROCEDURE — 3074F PR MOST RECENT SYSTOLIC BLOOD PRESSURE < 130 MM HG: ICD-10-PCS | Mod: CPTII,,, | Performed by: FAMILY MEDICINE

## 2023-04-21 PROCEDURE — 99213 OFFICE O/P EST LOW 20 MIN: CPT | Mod: S$PBB,,, | Performed by: FAMILY MEDICINE

## 2023-04-21 PROCEDURE — 3079F DIAST BP 80-89 MM HG: CPT | Mod: CPTII,,, | Performed by: FAMILY MEDICINE

## 2023-04-21 PROCEDURE — 3008F BODY MASS INDEX DOCD: CPT | Mod: CPTII,,, | Performed by: FAMILY MEDICINE

## 2023-04-21 PROCEDURE — 1160F PR REVIEW ALL MEDS BY PRESCRIBER/CLIN PHARMACIST DOCUMENTED: ICD-10-PCS | Mod: CPTII,,, | Performed by: FAMILY MEDICINE

## 2023-04-24 NOTE — PROGRESS NOTES
[facility] - Clinic Note    Subjective      Mr. Tam is a 53 y.o. male who presents to clinic for Follow-up (3mth follow up/refills)    In regard to the patient's diabetes, patient reports that blood sugars have been Good control. Patient denies hypoglycemia. Patient is not currently on insulin therapy. Patient is not on ACE/ARB and is on statin. Last a1c was   Hemoglobin A1C   Date Value Ref Range Status   03/13/2023 6.8 (H) 4.0 - 5.6 % Final     Comment:     ADA Screening Guidelines:  5.7-6.4%  Consistent with prediabetes  >or=6.5%  Consistent with diabetes    High levels of fetal hemoglobin interfere with the HbA1C  assay. Heterozygous hemoglobin variants (HbS, HgC, etc)do  not significantly interfere with this assay.   However, presence of multiple variants may affect accuracy.     12/13/2022 8.4 (H) 4.0 - 5.6 % Final     Comment:     ADA Screening Guidelines:  5.7-6.4%  Consistent with prediabetes  >or=6.5%  Consistent with diabetes    High levels of fetal hemoglobin interfere with the HbA1C  assay. Heterozygous hemoglobin variants (HbS, HgC, etc)do  not significantly interfere with this assay.   However, presence of multiple variants may affect accuracy.     06/10/2022 7.4 (H) 4.0 - 5.6 % Final     Comment:     ADA Screening Guidelines:  5.7-6.4%  Consistent with prediabetes  >or=6.5%  Consistent with diabetes    High levels of fetal hemoglobin interfere with the HbA1C  assay. Heterozygous hemoglobin variants (HbS, HgC, etc)do  not significantly interfere with this assay.   However, presence of multiple variants may affect accuracy.       PMH Nick has a past medical history of Diabetes mellitus, GERD (gastroesophageal reflux disease), Gout, Hypertension, and Obesity.   PSXH Nick has a past surgical history that includes Cholecystectomy; Colonoscopy (N/A, 7/28/2021); and Esophagogastroduodenoscopy (N/A, 7/28/2021).    Nick's family history is not on file.    Nick reports that he has never  "smoked. He has never used smokeless tobacco. He reports that he does not currently use alcohol. He reports that he does not use drugs.   SANDIP Romero is allergic to shrimp, codeine, and pcn [penicillins].   LEMUEL Romero has a current medication list which includes the following prescription(s): albuterol, atorvastatin, blood sugar diagnostic, blood-glucose meter, clonidine, dapagliflozin, dicyclomine, lancets, liraglutide 0.6 mg/0.1 ml (18 mg/3 ml) subq pnij, ondansetron, pantoprazole, sucralfate, unifine pentips, bupropion, gabapentin, metoprolol succinate, and nitroglycerin.     Review of Systems   Constitutional:  Negative for chills and fever.   Respiratory:  Negative for shortness of breath.    Cardiovascular:  Negative for chest pain.   Gastrointestinal:  Negative for nausea.   Objective     /80 (BP Location: Right arm, Patient Position: Sitting, BP Method: Large (Manual))   Pulse 89   Temp 98.1 °F (36.7 °C) (Temporal)   Resp 18   Ht 5' 11" (1.803 m)   Wt (!) 137.6 kg (303 lb 6.4 oz)   SpO2 97%   BMI 42.32 kg/m²     Physical Exam  Vitals and nursing note reviewed.   Constitutional:       General: He is not in acute distress.     Appearance: Normal appearance. He is well-developed. He is not diaphoretic.   HENT:      Head: Normocephalic and atraumatic.      Right Ear: External ear normal.      Left Ear: External ear normal.   Eyes:      General:         Right eye: No discharge.         Left eye: No discharge.   Cardiovascular:      Rate and Rhythm: Normal rate and regular rhythm.      Heart sounds: Normal heart sounds.   Pulmonary:      Effort: Pulmonary effort is normal.      Breath sounds: Normal breath sounds. No wheezing or rales.   Skin:     General: Skin is warm and dry.   Neurological:      Mental Status: He is alert and oriented to person, place, and time. Mental status is at baseline.   Psychiatric:         Mood and Affect: Mood normal.         Behavior: Behavior normal.         Thought " Content: Thought content normal.         Judgment: Judgment normal.      Assessment/Plan     1. Type 2 diabetes mellitus with diabetic polyneuropathy, without long-term current use of insulin          Continue current medications as prescribed.  Follow-up in 2 months.    Future Appointments   Date Time Provider Department Center   6/13/2023 10:00 AM Alexander Fisher MD [facility]   9/8/2023 10:40 AM Alexander Fisher MD [facility] Fisher MD  Family Medicine  [facility]

## 2023-05-01 DIAGNOSIS — I10 ESSENTIAL HYPERTENSION: ICD-10-CM

## 2023-05-01 DIAGNOSIS — F32.A DEPRESSION, UNSPECIFIED DEPRESSION TYPE: ICD-10-CM

## 2023-05-03 DIAGNOSIS — R05.3 CHRONIC COUGH: ICD-10-CM

## 2023-05-03 DIAGNOSIS — E11.9 TYPE 2 DIABETES MELLITUS WITHOUT COMPLICATION, WITHOUT LONG-TERM CURRENT USE OF INSULIN: ICD-10-CM

## 2023-05-03 DIAGNOSIS — Z82.49 FAMILY HISTORY OF PREMATURE CAD: ICD-10-CM

## 2023-05-03 DIAGNOSIS — I10 ESSENTIAL HYPERTENSION: ICD-10-CM

## 2023-05-03 DIAGNOSIS — Z91.89 CARDIOVASCULAR EVENT RISK: ICD-10-CM

## 2023-05-03 DIAGNOSIS — E11.42 TYPE 2 DIABETES MELLITUS WITH DIABETIC POLYNEUROPATHY, WITHOUT LONG-TERM CURRENT USE OF INSULIN: ICD-10-CM

## 2023-05-03 DIAGNOSIS — F32.A DEPRESSION, UNSPECIFIED DEPRESSION TYPE: ICD-10-CM

## 2023-05-03 DIAGNOSIS — K21.9 GASTROESOPHAGEAL REFLUX DISEASE, UNSPECIFIED WHETHER ESOPHAGITIS PRESENT: ICD-10-CM

## 2023-05-03 DIAGNOSIS — R14.2 BELCHING: ICD-10-CM

## 2023-05-03 RX ORDER — CLONIDINE HYDROCHLORIDE 0.2 MG/1
0.2 TABLET ORAL DAILY
Qty: 90 TABLET | Refills: 3 | Status: CANCELLED | OUTPATIENT
Start: 2023-05-03

## 2023-05-03 RX ORDER — METOPROLOL SUCCINATE 50 MG/1
50 TABLET, EXTENDED RELEASE ORAL DAILY
Qty: 90 TABLET | Refills: 3 | Status: CANCELLED | OUTPATIENT
Start: 2023-05-03 | End: 2024-05-02

## 2023-05-03 RX ORDER — ATORVASTATIN CALCIUM 10 MG/1
10 TABLET, FILM COATED ORAL DAILY
Qty: 30 TABLET | Refills: 0 | Status: CANCELLED | OUTPATIENT
Start: 2023-05-03 | End: 2023-06-02

## 2023-05-03 RX ORDER — DICYCLOMINE HYDROCHLORIDE 10 MG/1
CAPSULE ORAL
Qty: 120 CAPSULE | Refills: 1 | Status: CANCELLED | OUTPATIENT
Start: 2023-05-03

## 2023-05-03 RX ORDER — BUPROPION HYDROCHLORIDE 150 MG/1
TABLET ORAL
Qty: 90 TABLET | Refills: 3 | Status: SHIPPED | OUTPATIENT
Start: 2023-05-03

## 2023-05-03 RX ORDER — PANTOPRAZOLE SODIUM 40 MG/1
40 TABLET, DELAYED RELEASE ORAL 2 TIMES DAILY
Qty: 60 TABLET | Refills: 11 | Status: CANCELLED | OUTPATIENT
Start: 2023-05-03

## 2023-05-03 RX ORDER — ALBUTEROL SULFATE 90 UG/1
2 AEROSOL, METERED RESPIRATORY (INHALATION) EVERY 6 HOURS PRN
Qty: 18 G | Refills: 0 | Status: CANCELLED | OUTPATIENT
Start: 2023-05-03 | End: 2024-05-02

## 2023-05-03 RX ORDER — BUPROPION HYDROCHLORIDE 150 MG/1
150 TABLET ORAL DAILY
Qty: 90 TABLET | Refills: 3 | Status: CANCELLED | OUTPATIENT
Start: 2023-05-03 | End: 2024-05-02

## 2023-05-03 RX ORDER — GABAPENTIN 300 MG/1
300 CAPSULE ORAL NIGHTLY
Qty: 90 CAPSULE | Refills: 3 | Status: CANCELLED | OUTPATIENT
Start: 2023-05-03 | End: 2024-05-02

## 2023-05-03 RX ORDER — METOPROLOL SUCCINATE 50 MG/1
TABLET, EXTENDED RELEASE ORAL
Qty: 90 TABLET | Refills: 3 | Status: SHIPPED | OUTPATIENT
Start: 2023-05-03

## 2023-05-03 RX ORDER — DICYCLOMINE HYDROCHLORIDE 20 MG/1
TABLET ORAL
Qty: 60 TABLET | Refills: 0 | OUTPATIENT
Start: 2023-05-03

## 2023-05-30 DIAGNOSIS — Z91.89 CARDIOVASCULAR EVENT RISK: ICD-10-CM

## 2023-05-30 DIAGNOSIS — Z82.49 FAMILY HISTORY OF PREMATURE CAD: ICD-10-CM

## 2023-05-30 DIAGNOSIS — E11.9 TYPE 2 DIABETES MELLITUS WITHOUT COMPLICATION, WITHOUT LONG-TERM CURRENT USE OF INSULIN: ICD-10-CM

## 2023-05-30 RX ORDER — ATORVASTATIN CALCIUM 10 MG/1
10 TABLET, FILM COATED ORAL DAILY
Qty: 90 TABLET | Refills: 3 | Status: SHIPPED | OUTPATIENT
Start: 2023-05-30 | End: 2024-05-24

## 2023-05-30 RX ORDER — DICYCLOMINE HYDROCHLORIDE 10 MG/1
CAPSULE ORAL
Qty: 120 CAPSULE | Refills: 1 | Status: SHIPPED | OUTPATIENT
Start: 2023-05-30 | End: 2023-08-01

## 2023-06-21 DIAGNOSIS — E11.9 TYPE 2 DIABETES MELLITUS WITHOUT COMPLICATION: ICD-10-CM

## 2023-06-22 NOTE — TELEPHONE ENCOUNTER
Removed old refill request. Rx has been done already.     Yaneli Caban [facility]   Alert and oriented to person, place and time, memory intact, behavior appropriate to situation, PERRL.

## 2023-07-01 ENCOUNTER — HOSPITAL ENCOUNTER (EMERGENCY)
Facility: HOSPITAL | Age: 53
Discharge: HOME | End: 2023-07-01
Attending: EMERGENCY MEDICINE
Payer: MEDICAID

## 2023-07-01 VITALS
SYSTOLIC BLOOD PRESSURE: 137 MMHG | TEMPERATURE: 98 F | HEIGHT: 67 IN | HEART RATE: 96 BPM | RESPIRATION RATE: 20 BRPM | WEIGHT: 300 LBS | OXYGEN SATURATION: 97 % | DIASTOLIC BLOOD PRESSURE: 93 MMHG | BODY MASS INDEX: 47.09 KG/M2

## 2023-07-01 DIAGNOSIS — L73.9 FOLLICULITIS: Primary | ICD-10-CM

## 2023-07-01 DIAGNOSIS — B37.2 YEAST DERMATITIS: ICD-10-CM

## 2023-07-01 DIAGNOSIS — L03.311 CELLULITIS OF ABDOMINAL WALL: ICD-10-CM

## 2023-07-01 PROCEDURE — 63600175 PHARM REV CODE 636 W HCPCS: Mod: UD | Performed by: NURSE PRACTITIONER

## 2023-07-01 PROCEDURE — 96372 THER/PROPH/DIAG INJ SC/IM: CPT | Performed by: NURSE PRACTITIONER

## 2023-07-01 PROCEDURE — 25000003 PHARM REV CODE 250: Performed by: NURSE PRACTITIONER

## 2023-07-01 PROCEDURE — 99284 EMERGENCY DEPT VISIT MOD MDM: CPT

## 2023-07-01 RX ORDER — HYDROCODONE BITARTRATE AND ACETAMINOPHEN 7.5; 325 MG/1; MG/1
1 TABLET ORAL
Status: COMPLETED | OUTPATIENT
Start: 2023-07-01 | End: 2023-07-01

## 2023-07-01 RX ORDER — PROMETHAZINE HYDROCHLORIDE 12.5 MG/1
25 TABLET ORAL
Status: COMPLETED | OUTPATIENT
Start: 2023-07-01 | End: 2023-07-01

## 2023-07-01 RX ORDER — CEFTRIAXONE 1 G/1
1 INJECTION, POWDER, FOR SOLUTION INTRAMUSCULAR; INTRAVENOUS
Status: COMPLETED | OUTPATIENT
Start: 2023-07-01 | End: 2023-07-01

## 2023-07-01 RX ORDER — MUPIROCIN 20 MG/G
OINTMENT TOPICAL 3 TIMES DAILY
Qty: 22 G | Refills: 1 | Status: SHIPPED | OUTPATIENT
Start: 2023-07-01

## 2023-07-01 RX ORDER — FLUCONAZOLE 150 MG/1
TABLET ORAL
Qty: 2 TABLET | Refills: 0 | Status: SHIPPED | OUTPATIENT
Start: 2023-07-01 | End: 2023-09-11

## 2023-07-01 RX ORDER — MICONAZOLE NITRATE 2 %
POWDER (GRAM) TOPICAL
Qty: 85 G | Refills: 2 | Status: SHIPPED | OUTPATIENT
Start: 2023-07-01

## 2023-07-01 RX ORDER — SULFAMETHOXAZOLE AND TRIMETHOPRIM 800; 160 MG/1; MG/1
1 TABLET ORAL 2 TIMES DAILY
Qty: 20 TABLET | Refills: 0 | Status: SHIPPED | OUTPATIENT
Start: 2023-07-01 | End: 2023-07-11

## 2023-07-01 RX ADMIN — PROMETHAZINE HYDROCHLORIDE 25 MG: 12.5 TABLET ORAL at 07:07

## 2023-07-01 RX ADMIN — HYDROCODONE BITARTRATE AND ACETAMINOPHEN 1 TABLET: 7.5; 325 TABLET ORAL at 07:07

## 2023-07-01 RX ADMIN — CEFTRIAXONE SODIUM 1 G: 1 INJECTION, POWDER, FOR SOLUTION INTRAMUSCULAR; INTRAVENOUS at 07:07

## 2023-07-02 NOTE — ED PROVIDER NOTES
Encounter Date: 7/1/2023       History     Chief Complaint   Patient presents with    Abscess     Pt reports three abscesses on lower abdomen  x 1 week     POV the ED with family.  Patient complains of sores.  In his pubic hair and the lower abdominal area.  He denies fever.  Reports that he has GI issues, he has chronic vomiting all the time. no acute changes.  No other complaints    The history is provided by the patient.   Review of patient's allergies indicates:   Allergen Reactions    Shrimp Shortness Of Breath    Codeine     Pcn [penicillins]      Past Medical History:   Diagnosis Date    Diabetes mellitus     GERD (gastroesophageal reflux disease)     Gout     Hypertension     Obesity      Past Surgical History:   Procedure Laterality Date    CHOLECYSTECTOMY      COLONOSCOPY N/A 7/28/2021    Procedure: COLONOSCOPY;  Surgeon: Joey Portillo MD;  Location: [facility];  Service: Endoscopy;  Laterality: N/A;    ESOPHAGOGASTRODUODENOSCOPY N/A 7/28/2021    Procedure: EGD (ESOPHAGOGASTRODUODENOSCOPY);  Surgeon: Joey Portillo MD;  Location: [facility];  Service: Endoscopy;  Laterality: N/A;     No family history on file.  Social History     Tobacco Use    Smoking status: Never    Smokeless tobacco: Never   Substance Use Topics    Alcohol use: Not Currently    Drug use: Never     Review of Systems   Constitutional:  Negative for chills and fever.   HENT:  Negative for ear pain and sore throat.    Respiratory:  Negative for cough and shortness of breath.    Cardiovascular:  Negative for chest pain.   Gastrointestinal:  Positive for nausea and vomiting. Negative for abdominal pain and diarrhea.   Skin:         Sores in his pubic hairs, abdomen area   All other systems reviewed and are negative.    Physical Exam     Initial Vitals [07/01/23 1705]   BP Pulse Resp Temp SpO2   (!) 137/93 96 18 97.8 °F (36.6 °C) 97 %      MAP       --         Physical Exam    Nursing note and vitals reviewed.  Constitutional: He appears  well-developed and well-nourished. No distress.   Obese   HENT:   Head: Normocephalic.   Mouth/Throat: Oropharynx is clear and moist.   Eyes: Pupils are equal, round, and reactive to light.   Neck:   Normal range of motion.  Cardiovascular:  Normal rate and regular rhythm.           Pulmonary/Chest: Breath sounds normal.   Abdominal: Abdomen is soft. Bowel sounds are normal. He exhibits no distension.   Musculoskeletal:         General: Normal range of motion.      Cervical back: Normal range of motion.     Neurological: He is alert and oriented to person, place, and time. He has normal strength. GCS score is 15. GCS eye subscore is 4. GCS verbal subscore is 5. GCS motor subscore is 6.   Skin: Skin is warm and dry. Capillary refill takes 2 to 3 seconds.   Noted scattered pustular lesions in the pubic hair consistent with folliculitis.  Noted redness mild induration without any abscess noted right lower quadrant area.  Noted large abdomen pannus, erythema with weeping skin noted.  Consistent with yeast   Psychiatric: He has a normal mood and affect. Thought content normal.       ED Course   Procedures  Labs Reviewed - No data to display       Imaging Results    None          Medications   cefTRIAXone injection 1 g (1 g Intramuscular Given 7/1/23 1938)   HYDROcodone-acetaminophen 7.5-325 mg per tablet 1 tablet (1 tablet Oral Given 7/1/23 1936)   promethazine tablet 25 mg (25 mg Oral Given 7/1/23 1936)     Medical Decision Making:   Initial Assessment:   Presents for evaluation of sores.  Disposition pending  Differential Diagnosis:   Abscess, cellulitis, folliculitis, yeast  ED Management:  No appreciated abscess.  Patient is medicated with Rocephin 1 g in the ED.  He will have prescriptions for home use.  He was to follow with clinic.  He agrees with plan of care to return as needed  Other:   I discussed test(s) with the performing physician.       <> Summary of the Findings: Dr Esparza                        Clinical  Impression:                Joelle Gonzales, YOEL  07/01/23 2033       Joelle Gonzales, YOEL  07/01/23 2037

## 2023-07-02 NOTE — DISCHARGE INSTRUCTIONS
Rest, increase fluids, lots of water and liquids.  Keep sores clean and dry.  Apply paper towel or other product to the abdomen area to keep the area dry.  Call office for recheck.  Return as needed.  Continue prescribed medications

## 2023-08-01 RX ORDER — DICYCLOMINE HYDROCHLORIDE 10 MG/1
CAPSULE ORAL
Qty: 120 CAPSULE | Refills: 1 | Status: SHIPPED | OUTPATIENT
Start: 2023-08-01

## 2023-08-16 DIAGNOSIS — E11.9 TYPE 2 DIABETES MELLITUS WITHOUT COMPLICATION: ICD-10-CM

## 2023-08-16 DIAGNOSIS — E11.9 TYPE 2 DIABETES MELLITUS WITHOUT COMPLICATION, UNSPECIFIED WHETHER LONG TERM INSULIN USE: ICD-10-CM

## 2023-09-01 DIAGNOSIS — I10 ESSENTIAL HYPERTENSION: ICD-10-CM

## 2023-09-01 RX ORDER — CLONIDINE HYDROCHLORIDE 0.2 MG/1
0.2 TABLET ORAL
Qty: 90 TABLET | Refills: 3 | Status: SHIPPED | OUTPATIENT
Start: 2023-09-01

## 2023-09-11 ENCOUNTER — CLINICAL SUPPORT (OUTPATIENT)
Dept: FAMILY MEDICINE | Facility: CLINIC | Age: 53
End: 2023-09-11
Attending: FAMILY MEDICINE
Payer: MEDICAID

## 2023-09-11 ENCOUNTER — OFFICE VISIT (OUTPATIENT)
Dept: FAMILY MEDICINE | Facility: CLINIC | Age: 53
End: 2023-09-11
Payer: MEDICAID

## 2023-09-11 VITALS
DIASTOLIC BLOOD PRESSURE: 78 MMHG | BODY MASS INDEX: 45.83 KG/M2 | RESPIRATION RATE: 18 BRPM | OXYGEN SATURATION: 98 % | WEIGHT: 292 LBS | HEART RATE: 81 BPM | SYSTOLIC BLOOD PRESSURE: 134 MMHG | HEIGHT: 67 IN | TEMPERATURE: 98 F

## 2023-09-11 DIAGNOSIS — E11.9 TYPE 2 DIABETES MELLITUS WITHOUT COMPLICATION, WITHOUT LONG-TERM CURRENT USE OF INSULIN: ICD-10-CM

## 2023-09-11 DIAGNOSIS — K21.9 GASTROESOPHAGEAL REFLUX DISEASE, UNSPECIFIED WHETHER ESOPHAGITIS PRESENT: ICD-10-CM

## 2023-09-11 DIAGNOSIS — Z00.00 ANNUAL PHYSICAL EXAM: Primary | ICD-10-CM

## 2023-09-11 DIAGNOSIS — R14.2 BELCHING: ICD-10-CM

## 2023-09-11 PROCEDURE — 3078F DIAST BP <80 MM HG: CPT | Mod: CPTII,,, | Performed by: FAMILY MEDICINE

## 2023-09-11 PROCEDURE — 1159F MED LIST DOCD IN RCRD: CPT | Mod: CPTII,,, | Performed by: FAMILY MEDICINE

## 2023-09-11 PROCEDURE — 2025F DIABETIC EYE SCREENING PHOTO: ICD-10-PCS | Mod: CPTII,,, | Performed by: OPTOMETRIST

## 2023-09-11 PROCEDURE — 3075F PR MOST RECENT SYSTOLIC BLOOD PRESS GE 130-139MM HG: ICD-10-PCS | Mod: CPTII,,, | Performed by: FAMILY MEDICINE

## 2023-09-11 PROCEDURE — 2025F 7 FLD RTA PHOTO W/O RTNOPTHY: CPT | Mod: CPTII,,, | Performed by: OPTOMETRIST

## 2023-09-11 PROCEDURE — 1159F PR MEDICATION LIST DOCUMENTED IN MEDICAL RECORD: ICD-10-PCS | Mod: CPTII,,, | Performed by: FAMILY MEDICINE

## 2023-09-11 PROCEDURE — 99396 PREV VISIT EST AGE 40-64: CPT | Mod: S$PBB,,, | Performed by: FAMILY MEDICINE

## 2023-09-11 PROCEDURE — 92228 IMG RTA DETC/MNTR DS PHY/QHP: CPT | Mod: 26,S$PBB,, | Performed by: OPTOMETRIST

## 2023-09-11 PROCEDURE — 92228 IMG RTA DETC/MNTR DS PHY/QHP: CPT | Mod: PBBFAC

## 2023-09-11 PROCEDURE — 1160F PR REVIEW ALL MEDS BY PRESCRIBER/CLIN PHARMACIST DOCUMENTED: ICD-10-PCS | Mod: CPTII,,, | Performed by: FAMILY MEDICINE

## 2023-09-11 PROCEDURE — 3008F BODY MASS INDEX DOCD: CPT | Mod: CPTII,,, | Performed by: FAMILY MEDICINE

## 2023-09-11 PROCEDURE — 92228 DIABETIC EYE SCREENING PHOTO: ICD-10-PCS | Mod: 26,S$PBB,, | Performed by: OPTOMETRIST

## 2023-09-11 PROCEDURE — 3044F PR MOST RECENT HEMOGLOBIN A1C LEVEL <7.0%: ICD-10-PCS | Mod: CPTII,,, | Performed by: FAMILY MEDICINE

## 2023-09-11 PROCEDURE — 3044F HG A1C LEVEL LT 7.0%: CPT | Mod: CPTII,,, | Performed by: FAMILY MEDICINE

## 2023-09-11 PROCEDURE — 99214 OFFICE O/P EST MOD 30 MIN: CPT | Mod: PBBFAC | Performed by: FAMILY MEDICINE

## 2023-09-11 PROCEDURE — 99999 PR PBB SHADOW E&M-EST. PATIENT-LVL IV: CPT | Mod: PBBFAC,,, | Performed by: FAMILY MEDICINE

## 2023-09-11 PROCEDURE — 99999 PR PBB SHADOW E&M-EST. PATIENT-LVL IV: ICD-10-PCS | Mod: PBBFAC,,, | Performed by: FAMILY MEDICINE

## 2023-09-11 PROCEDURE — 99396 PR PREVENTIVE VISIT,EST,40-64: ICD-10-PCS | Mod: S$PBB,,, | Performed by: FAMILY MEDICINE

## 2023-09-11 PROCEDURE — 3075F SYST BP GE 130 - 139MM HG: CPT | Mod: CPTII,,, | Performed by: FAMILY MEDICINE

## 2023-09-11 PROCEDURE — 3078F PR MOST RECENT DIASTOLIC BLOOD PRESSURE < 80 MM HG: ICD-10-PCS | Mod: CPTII,,, | Performed by: FAMILY MEDICINE

## 2023-09-11 PROCEDURE — 1160F RVW MEDS BY RX/DR IN RCRD: CPT | Mod: CPTII,,, | Performed by: FAMILY MEDICINE

## 2023-09-11 PROCEDURE — 3008F PR BODY MASS INDEX (BMI) DOCUMENTED: ICD-10-PCS | Mod: CPTII,,, | Performed by: FAMILY MEDICINE

## 2023-09-11 RX ORDER — PANTOPRAZOLE SODIUM 40 MG/1
40 TABLET, DELAYED RELEASE ORAL 2 TIMES DAILY
Qty: 60 TABLET | Refills: 11 | Status: SHIPPED | OUTPATIENT
Start: 2023-09-11

## 2023-09-11 RX ORDER — SUCRALFATE 1 G/1
1 TABLET ORAL 3 TIMES DAILY PRN
Qty: 90 TABLET | Refills: 11 | Status: SHIPPED | OUTPATIENT
Start: 2023-09-11

## 2023-09-11 NOTE — PROGRESS NOTES
[facility] - Clinic Note    Subjective      Mr. Tam is a 53 y.o. male who presents to clinic for Follow-up (3mth follow up)    In regard to the patient's diabetes, patient reports that blood sugars have been Good control. Patient denies hypoglycemia. Patient is not currently on insulin therapy. Patient is not on ACE/ARB and is on statin. Last a1c was   Hemoglobin A1C   Date Value Ref Range Status   03/13/2023 6.8 (H) 4.0 - 5.6 % Final     Comment:     ADA Screening Guidelines:  5.7-6.4%  Consistent with prediabetes  >or=6.5%  Consistent with diabetes    High levels of fetal hemoglobin interfere with the HbA1C  assay. Heterozygous hemoglobin variants (HbS, HgC, etc)do  not significantly interfere with this assay.   However, presence of multiple variants may affect accuracy.     12/13/2022 8.4 (H) 4.0 - 5.6 % Final     Comment:     ADA Screening Guidelines:  5.7-6.4%  Consistent with prediabetes  >or=6.5%  Consistent with diabetes    High levels of fetal hemoglobin interfere with the HbA1C  assay. Heterozygous hemoglobin variants (HbS, HgC, etc)do  not significantly interfere with this assay.   However, presence of multiple variants may affect accuracy.     06/10/2022 7.4 (H) 4.0 - 5.6 % Final     Comment:     ADA Screening Guidelines:  5.7-6.4%  Consistent with prediabetes  >or=6.5%  Consistent with diabetes    High levels of fetal hemoglobin interfere with the HbA1C  assay. Heterozygous hemoglobin variants (HbS, HgC, etc)do  not significantly interfere with this assay.   However, presence of multiple variants may affect accuracy.       PMH Nick has a past medical history of Diabetes mellitus, GERD (gastroesophageal reflux disease), Gout, Hypertension, and Obesity.   PSXH Nick has a past surgical history that includes Cholecystectomy; Colonoscopy (N/A, 7/28/2021); and Esophagogastroduodenoscopy (N/A, 7/28/2021).    Nick's family history is not on file.   SHAI Romero reports that he has never smoked. He  "has never used smokeless tobacco. He reports that he does not currently use alcohol. He reports that he does not use drugs.   SANDIP Romero is allergic to shrimp, codeine, and pcn [penicillins].   LEMUEL Romero has a current medication list which includes the following prescription(s): albuterol, allopurinol, atorvastatin, blood sugar diagnostic, blood-glucose meter, bupropion, clonidine, dapagliflozin propanediol, dicyclomine, lancets, liraglutide 0.6 mg/0.1 ml (18 mg/3 ml) subq pnij, metoprolol succinate, ondansetron, unifine pentips, miconazole nitrate 2 %, mupirocin, nitroglycerin, pantoprazole, and sucralfate.     Review of Systems   Constitutional:  Negative for chills and fever.   HENT:  Negative for congestion and rhinorrhea.    Eyes:  Negative for visual disturbance.   Respiratory:  Negative for cough and shortness of breath.    Cardiovascular:  Negative for chest pain.   Gastrointestinal:  Negative for abdominal pain, constipation, diarrhea, nausea and vomiting.   Genitourinary:  Negative for dysuria.   Musculoskeletal:  Negative for myalgias.   Skin:  Negative for rash.   Neurological:  Negative for weakness and headaches.     Objective     /78 (BP Location: Left arm, Patient Position: Sitting, BP Method: Large (Manual))   Pulse 81   Temp 97.9 °F (36.6 °C) (Temporal)   Resp 18   Ht 5' 7" (1.702 m)   Wt 132.5 kg (292 lb)   SpO2 98%   BMI 45.73 kg/m²     Physical Exam  Vitals and nursing note reviewed.   Constitutional:       General: He is not in acute distress.     Appearance: Normal appearance. He is well-developed. He is not diaphoretic.   HENT:      Head: Normocephalic and atraumatic.      Right Ear: External ear normal.      Left Ear: External ear normal.   Eyes:      General:         Right eye: No discharge.         Left eye: No discharge.   Cardiovascular:      Rate and Rhythm: Normal rate and regular rhythm.      Heart sounds: Normal heart sounds.   Pulmonary:      Effort: Pulmonary effort " is normal.      Breath sounds: Normal breath sounds. No wheezing or rales.   Skin:     General: Skin is warm and dry.   Neurological:      Mental Status: He is alert and oriented to person, place, and time. Mental status is at baseline.   Psychiatric:         Mood and Affect: Mood normal.         Behavior: Behavior normal.         Thought Content: Thought content normal.         Judgment: Judgment normal.        Assessment/Plan     1. Annual physical exam        2. Gastroesophageal reflux disease, unspecified whether esophagitis present  pantoprazole (PROTONIX) 40 MG tablet    sucralfate (CARAFATE) 1 gram tablet      3. Belching  pantoprazole (PROTONIX) 40 MG tablet    sucralfate (CARAFATE) 1 gram tablet      4. Type 2 diabetes mellitus without complication, without long-term current use of insulin  Lipid Panel    Comprehensive Metabolic Panel    CBC Auto Differential    Hemoglobin A1C    Diabetic Eye Screening Photo        Due for annual labs.  Refilled medication as above.  Diabetic eye camera today.  Follow-up in 6 months.    No future appointments.        Alexander Fisher MD  Family Medicine  [facility]

## 2023-09-11 NOTE — PATIENT INSTRUCTIONS
Nick Solomonelizabeth, a 53 y.o. male is here for a diabetic eye screening with non-dilated fundus photos per provider.     Patient cooperative?: Yes  Small pupils?: Yes  Last eye exam:    Glasses / Contacts? Patient wears glasses but is not wearing today  Surgery / Procedures?   Procedure Explained to patient  Patient Voiced understanding? Yes  Complaints from patient? No If yes, explain     Shyla Abel MA

## 2023-09-14 ENCOUNTER — LAB VISIT (OUTPATIENT)
Dept: LAB | Facility: HOSPITAL | Age: 53
End: 2023-09-14
Attending: FAMILY MEDICINE
Payer: MEDICAID

## 2023-09-14 DIAGNOSIS — E11.9 TYPE 2 DIABETES MELLITUS WITHOUT COMPLICATION, WITHOUT LONG-TERM CURRENT USE OF INSULIN: ICD-10-CM

## 2023-09-14 LAB
ALBUMIN SERPL BCP-MCNC: 3.8 G/DL (ref 3.5–5.2)
ALP SERPL-CCNC: 83 U/L (ref 55–135)
ALT SERPL W/O P-5'-P-CCNC: 21 U/L (ref 10–44)
ANION GAP SERPL CALC-SCNC: 9 MMOL/L (ref 8–16)
AST SERPL-CCNC: 15 U/L (ref 10–40)
BASOPHILS # BLD AUTO: 0.08 K/UL (ref 0–0.2)
BASOPHILS NFR BLD: 0.9 % (ref 0–1.9)
BILIRUB SERPL-MCNC: 0.6 MG/DL (ref 0.1–1)
BUN SERPL-MCNC: 18 MG/DL (ref 6–20)
CALCIUM SERPL-MCNC: 9.7 MG/DL (ref 8.7–10.5)
CHLORIDE SERPL-SCNC: 105 MMOL/L (ref 95–110)
CHOLEST SERPL-MCNC: 147 MG/DL (ref 120–199)
CHOLEST/HDLC SERPL: 3 {RATIO} (ref 2–5)
CO2 SERPL-SCNC: 25 MMOL/L (ref 23–29)
CREAT SERPL-MCNC: 1.1 MG/DL (ref 0.5–1.4)
DIFFERENTIAL METHOD: ABNORMAL
EOSINOPHIL # BLD AUTO: 0.1 K/UL (ref 0–0.5)
EOSINOPHIL NFR BLD: 1.3 % (ref 0–8)
ERYTHROCYTE [DISTWIDTH] IN BLOOD BY AUTOMATED COUNT: 12.6 % (ref 11.5–14.5)
EST. GFR  (NO RACE VARIABLE): >60 ML/MIN/1.73 M^2
ESTIMATED AVG GLUCOSE: 128 MG/DL (ref 68–131)
GLUCOSE SERPL-MCNC: 130 MG/DL (ref 70–110)
HBA1C MFR BLD: 6.1 % (ref 4–5.6)
HCT VFR BLD AUTO: 44.3 % (ref 40–54)
HDLC SERPL-MCNC: 49 MG/DL (ref 40–75)
HDLC SERPL: 33.3 % (ref 20–50)
HGB BLD-MCNC: 15 G/DL (ref 14–18)
IMM GRANULOCYTES # BLD AUTO: 0.03 K/UL (ref 0–0.04)
IMM GRANULOCYTES NFR BLD AUTO: 0.3 % (ref 0–0.5)
LDLC SERPL CALC-MCNC: 73.4 MG/DL (ref 63–159)
LYMPHOCYTES # BLD AUTO: 2.4 K/UL (ref 1–4.8)
LYMPHOCYTES NFR BLD: 28 % (ref 18–48)
MCH RBC QN AUTO: 31.2 PG (ref 27–31)
MCHC RBC AUTO-ENTMCNC: 33.9 G/DL (ref 32–36)
MCV RBC AUTO: 92 FL (ref 82–98)
MONOCYTES # BLD AUTO: 0.7 K/UL (ref 0.3–1)
MONOCYTES NFR BLD: 7.8 % (ref 4–15)
NEUTROPHILS # BLD AUTO: 5.4 K/UL (ref 1.8–7.7)
NEUTROPHILS NFR BLD: 61.7 % (ref 38–73)
NONHDLC SERPL-MCNC: 98 MG/DL
NRBC BLD-RTO: 0 /100 WBC
PLATELET # BLD AUTO: 186 K/UL (ref 150–450)
PMV BLD AUTO: 9 FL (ref 9.2–12.9)
POTASSIUM SERPL-SCNC: 4.1 MMOL/L (ref 3.5–5.1)
PROT SERPL-MCNC: 7.6 G/DL (ref 6–8.4)
RBC # BLD AUTO: 4.81 M/UL (ref 4.6–6.2)
SODIUM SERPL-SCNC: 139 MMOL/L (ref 136–145)
TRIGL SERPL-MCNC: 123 MG/DL (ref 30–150)
WBC # BLD AUTO: 8.7 K/UL (ref 3.9–12.7)

## 2023-09-14 PROCEDURE — 36415 COLL VENOUS BLD VENIPUNCTURE: CPT | Performed by: FAMILY MEDICINE

## 2023-09-14 PROCEDURE — 85025 COMPLETE CBC W/AUTO DIFF WBC: CPT | Performed by: FAMILY MEDICINE

## 2023-09-14 PROCEDURE — 83036 HEMOGLOBIN GLYCOSYLATED A1C: CPT | Performed by: FAMILY MEDICINE

## 2023-09-14 PROCEDURE — 80061 LIPID PANEL: CPT | Performed by: FAMILY MEDICINE

## 2023-09-14 PROCEDURE — 80053 COMPREHEN METABOLIC PANEL: CPT | Performed by: FAMILY MEDICINE

## 2024-01-05 DIAGNOSIS — E11.42 TYPE 2 DIABETES MELLITUS WITH DIABETIC POLYNEUROPATHY, WITHOUT LONG-TERM CURRENT USE OF INSULIN: ICD-10-CM

## 2024-01-05 DIAGNOSIS — E11.9 TYPE 2 DIABETES MELLITUS WITHOUT COMPLICATION, WITHOUT LONG-TERM CURRENT USE OF INSULIN: ICD-10-CM

## 2024-01-05 NOTE — TELEPHONE ENCOUNTER
----- Message from Kiarra Phelan sent at 1/5/2024 11:10 AM CST -----  Good Morning    Nick Tam MRN [id number] came in he has an appointment end of month he will be out of diabetic meds as of tonight. Can you please call him.    Thanks  Kiarra

## 2024-01-07 RX ORDER — DAPAGLIFLOZIN 5 MG/1
5 TABLET, FILM COATED ORAL
Qty: 30 TABLET | Refills: 11 | Status: SHIPPED | OUTPATIENT
Start: 2024-01-07

## 2024-01-07 NOTE — TELEPHONE ENCOUNTER
Refill Routing Note   Medication(s) are not appropriate for processing by [facility] for the following reason(s):        Responsible provider unclear  No active prescription written by provider    [facility] action(s):  Defer        Medication Therapy Plan: could not route to pcp listed on profile; pharmacy requested under Dr. Henry.      Appointments  past 12m or future 3m with PCP    Date Provider   Last Visit   Visit date not found Floridalma Henry MD   Next Visit   1/25/2024 Floridalma Henry MD   ED visits in past 90 days: 0        Note composed:6:20 PM 01/06/2024

## 2024-01-25 ENCOUNTER — TELEPHONE (OUTPATIENT)
Dept: FAMILY MEDICINE | Facility: CLINIC | Age: 54
End: 2024-01-25

## 2024-01-25 ENCOUNTER — OFFICE VISIT (OUTPATIENT)
Dept: FAMILY MEDICINE | Facility: CLINIC | Age: 54
End: 2024-01-25
Payer: MEDICAID

## 2024-01-25 ENCOUNTER — LAB VISIT (OUTPATIENT)
Dept: LAB | Facility: HOSPITAL | Age: 54
End: 2024-01-25
Attending: FAMILY MEDICINE
Payer: MEDICAID

## 2024-01-25 VITALS
SYSTOLIC BLOOD PRESSURE: 124 MMHG | HEIGHT: 67 IN | OXYGEN SATURATION: 97 % | RESPIRATION RATE: 16 BRPM | DIASTOLIC BLOOD PRESSURE: 84 MMHG | HEART RATE: 91 BPM | WEIGHT: 298.5 LBS | BODY MASS INDEX: 46.85 KG/M2

## 2024-01-25 DIAGNOSIS — I10 ESSENTIAL HYPERTENSION: ICD-10-CM

## 2024-01-25 DIAGNOSIS — E11.9 TYPE 2 DIABETES MELLITUS WITHOUT COMPLICATION, WITHOUT LONG-TERM CURRENT USE OF INSULIN: ICD-10-CM

## 2024-01-25 DIAGNOSIS — E11.42 TYPE 2 DIABETES MELLITUS WITH DIABETIC POLYNEUROPATHY, WITHOUT LONG-TERM CURRENT USE OF INSULIN: ICD-10-CM

## 2024-01-25 DIAGNOSIS — E11.9 TYPE 2 DIABETES MELLITUS WITHOUT COMPLICATION, WITHOUT LONG-TERM CURRENT USE OF INSULIN: Primary | ICD-10-CM

## 2024-01-25 DIAGNOSIS — R11.0 NAUSEA: ICD-10-CM

## 2024-01-25 LAB
ALBUMIN/CREAT UR: 9.1 UG/MG (ref 0–30)
CREAT UR-MCNC: 77 MG/DL (ref 23–375)
ESTIMATED AVG GLUCOSE: 137 MG/DL (ref 68–131)
HBA1C MFR BLD: 6.4 % (ref 4–5.6)
MICROALBUMIN UR DL<=1MG/L-MCNC: 7 UG/ML

## 2024-01-25 PROCEDURE — 83036 HEMOGLOBIN GLYCOSYLATED A1C: CPT | Performed by: FAMILY MEDICINE

## 2024-01-25 PROCEDURE — 3079F DIAST BP 80-89 MM HG: CPT | Mod: CPTII,,, | Performed by: FAMILY MEDICINE

## 2024-01-25 PROCEDURE — 1159F MED LIST DOCD IN RCRD: CPT | Mod: CPTII,,, | Performed by: FAMILY MEDICINE

## 2024-01-25 PROCEDURE — 82043 UR ALBUMIN QUANTITATIVE: CPT | Performed by: FAMILY MEDICINE

## 2024-01-25 PROCEDURE — 1160F RVW MEDS BY RX/DR IN RCRD: CPT | Mod: CPTII,,, | Performed by: FAMILY MEDICINE

## 2024-01-25 PROCEDURE — 3008F BODY MASS INDEX DOCD: CPT | Mod: CPTII,,, | Performed by: FAMILY MEDICINE

## 2024-01-25 PROCEDURE — 99214 OFFICE O/P EST MOD 30 MIN: CPT | Mod: S$PBB,,, | Performed by: FAMILY MEDICINE

## 2024-01-25 PROCEDURE — 36415 COLL VENOUS BLD VENIPUNCTURE: CPT | Performed by: FAMILY MEDICINE

## 2024-01-25 PROCEDURE — 3074F SYST BP LT 130 MM HG: CPT | Mod: CPTII,,, | Performed by: FAMILY MEDICINE

## 2024-01-25 PROCEDURE — 99999 PR PBB SHADOW E&M-EST. PATIENT-LVL IV: CPT | Mod: PBBFAC,,, | Performed by: FAMILY MEDICINE

## 2024-01-25 PROCEDURE — 99214 OFFICE O/P EST MOD 30 MIN: CPT | Mod: PBBFAC,PN | Performed by: FAMILY MEDICINE

## 2024-01-25 RX ORDER — ONDANSETRON 4 MG/1
4 TABLET, ORALLY DISINTEGRATING ORAL EVERY 8 HOURS PRN
Qty: 30 TABLET | Refills: 1 | Status: SHIPPED | OUTPATIENT
Start: 2024-01-25 | End: 2024-04-24 | Stop reason: SDUPTHER

## 2024-01-25 NOTE — PROGRESS NOTES
Subjective:       Patient ID: Nick Tam is a 53 y.o. male.    Chief Complaint: Establish Care    Nick Tam presents today to establish care.   They are new to me but established with [facility].  Former patient of Dr. Fisher.     Patient Active Problem List:     Chest pain, onset 9/2020     Belching, chronic     Symptoms of gastroesophageal reflux     Type 2 diabetes mellitus with diabetic polyneuropathy, without long-term current use of insulin     Essential hypertension, onset before 1987     Class 3 severe obesity due to excess calories with serious comorbidity and body mass index (BMI) of 50.0 to 59.9 in adult     Literacy level of illiterate     Sedentary lifestyle, onset 2018     Chronic gout of multiple sites     Anemia     Hypoalbuminemia     Cardiovascular event risk, ASCVD 10-year risk 8.7%, 2021     Family history of premature CAD     Excessive daytime sleepiness     Family history of sleep apnea     Chronic fatigue     Gastroesophageal reflux disease     Depression     Chronic cough     COVID-19, 6/2021, no residual problem     Elevated ALT measurement     Fatty liver     Hiatal hernia     Hemorrhoids     Nausea     Abdominal pain     JORDAN on CPAP, 100% use     Obese abdomen    Current Outpatient Medications:  allopurinoL (ZYLOPRIM) 100 MG tablet, TAKE ONE TABLET BY MOUTH DAILY **THANK YOU**  atorvastatin (LIPITOR) 10 MG tablet, Take 1 tablet (10 mg total) by mouth once daily.  blood sugar diagnostic Strp, To check BG 3 times daily, to use with insurance preferred meter  buPROPion (WELLBUTRIN XL) 150 MG TB24 tablet, TAKE ONE TABLET BY MOUTH DAILY **THANK YOU**  cloNIDine (CATAPRES) 0.2 MG tablet, TAKE ONE TABLET BY MOUTH DAILY **THANK YOU**  dicyclomine (BENTYL) 10 MG capsule, TAKE ONE CAPSULE BY MOUTH FOUR TIMES DAILY **THANK YOU**  FARXIGA 5 mg Tab tablet, TAKE ONE TABLET BY MOUTH DAILY **THANK YOU**  lancets Misc, To check BG 2 times daily, to use with insurance preferred  "meter  liraglutide 0.6 mg/0.1 mL, 18 mg/3 mL, subq PNIJ (VICTOZA 2-JORDON) 0.6 mg/0.1 mL (18 mg/3 mL) PnIj pen, Inject 1.8 mg into the skin once daily.  metoprolol succinate (TOPROL-XL) 50 MG 24 hr tablet, TAKE ONE TABLET BY MOUTH DAILY **THANK YOU**  ondansetron (ZOFRAN-ODT) 4 MG TbDL, Take 1 tablet (4 mg total) by mouth every 8 (eight) hours as needed.  pantoprazole (PROTONIX) 40 MG tablet, Take 1 tablet (40 mg total) by mouth 2 (two) times daily.  sucralfate (CARAFATE) 1 gram tablet, Take 1 tablet (1 g total) by mouth 3 (three) times daily as needed.  UNIFINE PENTIPS 32 gauge x 5/32" Ndle, use with insulutin injection **THANK YOU**  albuterol (VENTOLIN HFA) 90 mcg/actuation inhaler, Inhale 2 puffs into the lungs every 6 (six) hours as needed for Wheezing. Rescue  blood-glucose meter kit, To check BG 2 times daily, to use with insurance preferred meter  miconazole NITRATE 2 % (MICOTIN) 2 % top powder, Apply topically as needed (yeast rash BID). (Patient not taking: Reported on 9/11/2023)  mupirocin (BACTROBAN) 2 % ointment, Apply topically 3 (three) times daily. (Patient not taking: Reported on 9/11/2023)  nitroGLYCERIN (NITROSTAT) 0.4 MG SL tablet, Place 1 tablet (0.4 mg total) under the tongue every 5 (five) minutes as needed for Chest pain. (Patient not taking: Reported on 3/8/2022)    No current facility-administered medications for this visit.    Lab Results       Component                Value               Date                       HGBA1C                   6.1 (H)             09/14/2023            Diabetes has been well controlled on current medication regimen.   He is on victoza and farxiga and has been stable on this medication regimen.   He is due for A1C recheck.              Review of Systems   Constitutional:  Negative for activity change, appetite change, fatigue and fever.   Respiratory:  Negative for shortness of breath.    Gastrointestinal:  Negative for abdominal pain.   Integumentary:  Negative " for rash.         Objective:      Physical Exam  Vitals and nursing note reviewed.   Constitutional:       General: He is not in acute distress.     Appearance: He is not ill-appearing.   Cardiovascular:      Rate and Rhythm: Normal rate and regular rhythm.      Pulses:           Dorsalis pedis pulses are 1+ on the right side and 1+ on the left side.        Posterior tibial pulses are 1+ on the right side and 1+ on the left side.      Heart sounds: No murmur heard.  Pulmonary:      Effort: Pulmonary effort is normal.      Breath sounds: Normal breath sounds. No wheezing.   Musculoskeletal:      Right foot: Normal range of motion.      Left foot: Normal range of motion.   Feet:      Right foot:      Protective Sensation: 10 sites tested.  0 sites sensed.      Skin integrity: Dry skin present. No ulcer, skin breakdown or erythema.      Toenail Condition: Right toenails are normal.      Left foot:      Protective Sensation: 10 sites tested.  2 sites sensed.      Skin integrity: Dry skin present. No ulcer, skin breakdown or erythema.      Toenail Condition: Left toenails are normal.      Comments: Absent to monofilament on the right  Only can feel on dorsal foot on left foot  Skin:     General: Skin is warm and dry.      Findings: No rash.   Neurological:      Mental Status: He is alert.   Psychiatric:         Mood and Affect: Mood normal.         Behavior: Behavior normal.         Assessment:       1. Type 2 diabetes mellitus without complication, without long-term current use of insulin    2. Essential hypertension, onset before 1987    3. Type 2 diabetes mellitus with diabetic polyneuropathy, without long-term current use of insulin    4. Nausea        Plan:       Problem List Items Addressed This Visit          Cardiac/Vascular    Essential hypertension, onset before 1987     Stable. Well controlled. Continue current medications.               Endocrine    Type 2 diabetes mellitus with diabetic polyneuropathy,  without long-term current use of insulin     Stable. Well controlled. Continue current medications.   Will check A1C today            GI    Nausea    Relevant Medications    ondansetron (ZOFRAN-ODT) 4 MG TbDL     Other Visit Diagnoses       Type 2 diabetes mellitus without complication, without long-term current use of insulin    -  Primary    Relevant Orders    Hemoglobin A1C (Completed)    Microalbumin/Creatinine Ratio, Urine (Completed)              Addendum: 1/31/2024 Received request from insurance company requesting why patient needs this medication at this time. Patient has been on victoza as far back as 2021 per chart review for control of his diabetes. Per chart review metformin caused nausea, abdominal pain and diarrhea and he cannot take this medication. His diabetes has been under good control. Recent increase in A1C from 6.1-6.4. It would be ill advised to stop this medication.   Lab Results   Component Value Date    HGBA1C 6.4 (H) 01/25/2024

## 2024-02-14 NOTE — ED PROVIDER NOTES
CHIEF COMPLAINT    Chief Complaint   Patient presents with    Chest Pain     WITH PALPATION        HPI    Nick Tam is a 50 y.o. male who presents complaining of chest pain.  Started about an hour ago he was walking over to see his mother.  Lasted for about 15 min until paramedics gave him nitroglycerin and full-dose aspirin.  Pain is starting to come back a little bit now.  It is in the center of his chest and feels more like a stabbing sensation.  Denies any history of coronary disease.  He does have some increased shortness of breath today over his baseline.  He has history of COPD.  He denies any recent fevers, chills, abdominal pain, vomiting, diarrhea, dysuria.  He has been nauseous and has been belching a lot today.. The severity of the symptoms is moderate.    CURRENT MEDICATIONS    Prior to Admission medications    Medication Sig Start Date End Date Taking? Authorizing Provider   allopurinol (ZYLOPRIM) 100 MG tablet Take 100 mg by mouth once daily.   Yes Historical Provider, MD   cloNIDine (CATAPRES) 0.2 MG tablet Take 0.2 mg by mouth 2 (two) times daily.   Yes Historical Provider, MD   dicyclomine HCl (BENTYL ORAL) Take by mouth.   Yes Historical Provider, MD   pantoprazole (PROTONIX) 20 MG tablet Take 20 mg by mouth once daily.   Yes Historical Provider, MD       ALLERGIES    Review of patient's allergies indicates:   Allergen Reactions    Codeine     Pcn [penicillins]        PAST MEDICAL HISTORY  Past Medical History:   Diagnosis Date    Gout     Hypertension        SURGICAL HISTORY    History reviewed. No pertinent surgical history.    SOCIAL HISTORY    Social History     Socioeconomic History    Marital status:      Spouse name: Not on file    Number of children: Not on file    Years of education: Not on file    Highest education level: Not on file   Occupational History    Not on file   Social Needs    Financial resource strain: Not on file    Food insecurity     Worry: Not on  "file     Inability: Not on file    Transportation needs     Medical: Not on file     Non-medical: Not on file   Tobacco Use    Smoking status: Never Smoker   Substance and Sexual Activity    Alcohol use: Not Currently    Drug use: Not on file    Sexual activity: Not on file   Lifestyle    Physical activity     Days per week: Not on file     Minutes per session: Not on file    Stress: Not on file   Relationships    Social connections     Talks on phone: Not on file     Gets together: Not on file     Attends Denominational service: Not on file     Active member of club or organization: Not on file     Attends meetings of clubs or organizations: Not on file     Relationship status: Not on file   Other Topics Concern    Not on file   Social History Narrative    Not on file       FAMILY HISTORY    History reviewed. No pertinent family history.    REVIEW OF SYSTEMS    Constitutional:  No reported fever, chills, weight loss or weakness.   Eyes:  No reported photophobia or discharge. No visual changes  HENT:  No reported sore throat or ear pain.   Respiratory:  See HPI  Cardiovascular:  No reported swelling.   GI:  No reported abdominal pain, vomiting, or diarrhea.   Musculoskeletal:  No reported back pain.   Skin:  No reported rash.   Neurologic:  No reported headache, focal weakness or sensory changes.   Endocrine:  No reported polyuria or polydypsia.   Lymphatic:  No reported swollen glands.   Psychiatric:  No reported depression, suicidal ideation or homicidal ideation.   All Systems otherwise negative except as noted in the Review of Systems and History of Present Illness.    PHYSICAL EXAM    VITAL SIGNS: BP (!) 140/90   Pulse 77   Temp 98.7 °F (37.1 °C) (Oral)   Resp 15   Ht 5' 11" (1.803 m)   Wt (!) 140.6 kg (310 lb)   SpO2 97%   BMI 43.24 kg/m²    Constitutional:  Well developed, Well nourished who appears stated age, No acute distress, Non-toxic appearance.   Respiratory: Breath sounds clear to " auscultation bilaterally, No respiratory distress, No wheezing, No chest tenderness.   Cardiovascular:  Normal heart rate, Normal rhythm, No murmurs, No rubs, No gallops.   GI:  Morbidly obese, Bowel sounds normal, Soft, No tenderness, No masses or hepatosplenomegaly, No pulsatile masses.   Musculoskeletal:  Intact distal pulses, No edema, No tenderness, No cyanosis, No clubbing. Good range of motion in all major joints. No tenderness to palpation or major deformities noted.   Integument:  Warm, Dry, No erythema, No rash.   Lymphatic:  No lymphadenopathy noted.   Neurologic:  Alert & oriented x 3, Motor and sensory function grossly intact, No focal deficits noted.   Psychiatric:  Affect normal, Judgment normal, Mood normal.     LABS  Pertinent labs reviewed. (See chart for details)   Labs Reviewed   COMPREHENSIVE METABOLIC PANEL - Abnormal; Notable for the following components:       Result Value    Glucose 146 (*)     All other components within normal limits   TROPONIN I - Abnormal; Notable for the following components:    Troponin I <0.01 (*)     All other components within normal limits   TROPONIN I - Abnormal; Notable for the following components:    Troponin I 0.01 (*)     All other components within normal limits    Narrative:     Repeat at 1730   CBC W/ AUTO DIFFERENTIAL   B-TYPE NATRIURETIC PEPTIDE   LIPASE       EKG    EKG Interpretation     Interpreted by me     Rhythm: Normal Sinus  Rate: 66  Axis: Normal  Ectopy: None  Conduction: Normal  ST Segments: No Acute Change  T Waves: No Acute Change  Q Waves: None     Clinical Impression: No acute ischemic changes    RADIOLOGY    Imaging Results          X-Ray Chest AP Portable (Final result)  Result time 08/24/20 16:06:03    Final result by Dean Marx MD (08/24/20 16:06:03)                 Impression:      No acute radiographic abnormality in the chest.      Electronically signed by: Dean Marx  Date:    08/24/2020  Time:    16:06             Narrative:     EXAMINATION:  XR CHEST AP PORTABLE    CLINICAL HISTORY:  chest pain;.    TECHNIQUE:  Single frontal portable view of the chest was performed.    COMPARISON:  08/24/2014    FINDINGS:  Cardiomediastinal silhouette is within normal limits.Lungs are clear.  No evidence of focal consolidation, pleural effusion, or pneumothorax.  Bones are intact.                              ED COURSE & MEDICAL DECISION MAKING    Medications   nitroGLYCERIN SL tablet 0.4 mg (0.4 mg Sublingual Given 8/24/20 1441)   (pyxis) gi cocktail (mylanta 30 mL, lidocaine 2 % viscous 10 mL, dicyclomine 10 mL) 50 mL ( Oral Given 8/24/20 1457)       The patient presents with the history as noted above. The differential diagnosis would include but is not limited to:  ACS, PE, AAA, dissection, dysrhythmia, electrolyte abnormality       patient presents with chest pain, palpitations and some upper abdominal discomfort and belching.  EKG shows no evidence of ischemia.  Lab workup unremarkable to include a negative troponin.  heart score of 3 which puts patient low risk of Mace.  Patient was given GI cocktail as well as nitroglycerin.  He was observed in the ED for 3 hr and repeat troponin is negative.  Doubt cardiac in nature.  Outpatient follow-up with his primary care doctor in the next few days refer to cardiologist outpatient.  ED return precautions given to patient.    FINAL IMPRESSION    1. Chest pain    2. Gastroesophageal reflux disease, esophagitis presence not specified          Andrew Hairston    The patient was discharged to home with the following prescriptions:   Current Discharge Medication List          I stressed the importance of close follow-up with:  [facility] - ED  [address] [address] [phone]    As needed, If symptoms worsen    Marcello Aguayo MD  [address] [facility] CARDIOLOGY [facility] [place] [address]  [phone]    In 1 week        I discussed the  differential diagnosis, treatment plan, and strict return precautions for any worsening symptoms or new concerning symptoms, and they stated understanding.        **Parts of this note were created using Dragon Voice Recognition software program. While efforts were made to correct any mistakes made by this voice recognition software program, nonsensical phrases may remain in this note.     Andrew Hairston, DO  08/24/20 2171     Detail Level: Detailed General Sunscreen Counseling: I recommend a broad spectrum sunscreen with a SPF of 30 or higher, and should be reapplied every 2-3 hours after that as long as sun exposure continues. I also recommend a lip balm with a sunscreen as well. Sun protective clothing and hats can be used but must be worn the entire time you are exposed to the sun's rays.

## 2024-04-24 ENCOUNTER — OFFICE VISIT (OUTPATIENT)
Dept: FAMILY MEDICINE | Facility: CLINIC | Age: 54
End: 2024-04-24
Payer: MEDICAID

## 2024-04-24 VITALS
OXYGEN SATURATION: 97 % | RESPIRATION RATE: 18 BRPM | HEIGHT: 67 IN | SYSTOLIC BLOOD PRESSURE: 134 MMHG | DIASTOLIC BLOOD PRESSURE: 86 MMHG | BODY MASS INDEX: 47.56 KG/M2 | HEART RATE: 81 BPM | WEIGHT: 303 LBS

## 2024-04-24 DIAGNOSIS — E11.9 TYPE 2 DIABETES MELLITUS WITHOUT COMPLICATION, WITHOUT LONG-TERM CURRENT USE OF INSULIN: ICD-10-CM

## 2024-04-24 DIAGNOSIS — M1A.09X0 IDIOPATHIC CHRONIC GOUT OF MULTIPLE SITES WITHOUT TOPHUS: ICD-10-CM

## 2024-04-24 DIAGNOSIS — Z82.49 FAMILY HISTORY OF PREMATURE CAD: ICD-10-CM

## 2024-04-24 DIAGNOSIS — I10 ESSENTIAL HYPERTENSION: ICD-10-CM

## 2024-04-24 DIAGNOSIS — R05.3 CHRONIC COUGH: ICD-10-CM

## 2024-04-24 DIAGNOSIS — Z91.89 CARDIOVASCULAR EVENT RISK: ICD-10-CM

## 2024-04-24 DIAGNOSIS — R11.0 NAUSEA: ICD-10-CM

## 2024-04-24 DIAGNOSIS — F32.A DEPRESSION, UNSPECIFIED DEPRESSION TYPE: ICD-10-CM

## 2024-04-24 DIAGNOSIS — E11.42 TYPE 2 DIABETES MELLITUS WITH DIABETIC POLYNEUROPATHY, WITHOUT LONG-TERM CURRENT USE OF INSULIN: ICD-10-CM

## 2024-04-24 PROCEDURE — 3066F NEPHROPATHY DOC TX: CPT | Mod: CPTII,,, | Performed by: FAMILY MEDICINE

## 2024-04-24 PROCEDURE — 3079F DIAST BP 80-89 MM HG: CPT | Mod: CPTII,,, | Performed by: FAMILY MEDICINE

## 2024-04-24 PROCEDURE — 3075F SYST BP GE 130 - 139MM HG: CPT | Mod: CPTII,,, | Performed by: FAMILY MEDICINE

## 2024-04-24 PROCEDURE — 99999 PR PBB SHADOW E&M-EST. PATIENT-LVL IV: CPT | Mod: PBBFAC,,, | Performed by: FAMILY MEDICINE

## 2024-04-24 PROCEDURE — 3061F NEG MICROALBUMINURIA REV: CPT | Mod: CPTII,,, | Performed by: FAMILY MEDICINE

## 2024-04-24 PROCEDURE — 99214 OFFICE O/P EST MOD 30 MIN: CPT | Mod: S$PBB,,, | Performed by: FAMILY MEDICINE

## 2024-04-24 PROCEDURE — 1159F MED LIST DOCD IN RCRD: CPT | Mod: CPTII,,, | Performed by: FAMILY MEDICINE

## 2024-04-24 PROCEDURE — 3008F BODY MASS INDEX DOCD: CPT | Mod: CPTII,,, | Performed by: FAMILY MEDICINE

## 2024-04-24 PROCEDURE — 99214 OFFICE O/P EST MOD 30 MIN: CPT | Mod: PBBFAC,PN | Performed by: FAMILY MEDICINE

## 2024-04-24 PROCEDURE — 1160F RVW MEDS BY RX/DR IN RCRD: CPT | Mod: CPTII,,, | Performed by: FAMILY MEDICINE

## 2024-04-24 PROCEDURE — 3044F HG A1C LEVEL LT 7.0%: CPT | Mod: CPTII,,, | Performed by: FAMILY MEDICINE

## 2024-04-24 RX ORDER — ALLOPURINOL 100 MG/1
100 TABLET ORAL DAILY
Qty: 90 TABLET | Refills: 3 | Status: SHIPPED | OUTPATIENT
Start: 2024-04-24

## 2024-04-24 RX ORDER — BUPROPION HYDROCHLORIDE 150 MG/1
150 TABLET ORAL DAILY
Qty: 90 TABLET | Refills: 3 | Status: SHIPPED | OUTPATIENT
Start: 2024-04-24

## 2024-04-24 RX ORDER — ATORVASTATIN CALCIUM 10 MG/1
10 TABLET, FILM COATED ORAL DAILY
Qty: 90 TABLET | Refills: 3 | Status: SHIPPED | OUTPATIENT
Start: 2024-04-24 | End: 2025-04-19

## 2024-04-24 RX ORDER — ONDANSETRON 4 MG/1
4 TABLET, ORALLY DISINTEGRATING ORAL EVERY 8 HOURS PRN
Qty: 30 TABLET | Refills: 1 | Status: SHIPPED | OUTPATIENT
Start: 2024-04-24

## 2024-04-24 RX ORDER — DAPAGLIFLOZIN 5 MG/1
5 TABLET, FILM COATED ORAL DAILY
Qty: 30 TABLET | Refills: 11 | Status: SHIPPED | OUTPATIENT
Start: 2024-04-24

## 2024-04-24 RX ORDER — CLONIDINE HYDROCHLORIDE 0.2 MG/1
0.2 TABLET ORAL DAILY
Qty: 90 TABLET | Refills: 3 | Status: SHIPPED | OUTPATIENT
Start: 2024-04-24

## 2024-04-24 RX ORDER — METOPROLOL SUCCINATE 50 MG/1
50 TABLET, EXTENDED RELEASE ORAL DAILY
Qty: 90 TABLET | Refills: 3 | Status: SHIPPED | OUTPATIENT
Start: 2024-04-24

## 2024-04-24 RX ORDER — ALBUTEROL SULFATE 90 UG/1
2 AEROSOL, METERED RESPIRATORY (INHALATION) EVERY 6 HOURS PRN
Qty: 18 G | Refills: 0 | Status: SHIPPED | OUTPATIENT
Start: 2024-04-24 | End: 2025-04-24

## 2024-04-24 NOTE — PROGRESS NOTES
Subjective:       Patient ID: Nick Tam is a 54 y.o. male.    Chief Complaint: Follow-up    Presents today to follow up chronic conditions.   He complains only of left ankle pain. He is still having tingling in his feet. He was taking gabapentin and it was not doing anything for him. He does have some tingling in his feet at times and he will hit his feet together and the sensation will come back.     He does not monitor sugar regularly but has been well controlled.     Left ankle- tore a ligament many years ago and when he gets a lot of weather changes or when gout flares up he will have the chronic ankle pain. He needs his handicap tag renewed in October.     Follow-up  Pertinent negatives include no abdominal pain, fatigue, fever or rash.       .  Patient Active Problem List   Diagnosis    Chest pain, onset 9/2020    Belching, chronic    Symptoms of gastroesophageal reflux    Type 2 diabetes mellitus with diabetic polyneuropathy, without long-term current use of insulin    Essential hypertension, onset before 1987    Class 3 severe obesity due to excess calories with serious comorbidity and body mass index (BMI) of 50.0 to 59.9 in adult    Literacy level of illiterate    Sedentary lifestyle, onset 2018    Chronic gout of multiple sites    Anemia    Hypoalbuminemia    Cardiovascular event risk, ASCVD 10-year risk 8.7%, 2021    Family history of premature CAD    Excessive daytime sleepiness    Family history of sleep apnea    Chronic fatigue    Gastroesophageal reflux disease    Depression    Chronic cough    COVID-19, 6/2021, no residual problem    Elevated ALT measurement    Fatty liver    Hiatal hernia    Hemorrhoids    Nausea    Abdominal pain    JORDAN on CPAP, 100% use    Obese abdomen     Nick has a current medication list which includes the following prescription(s): dicyclomine, lancets, liraglutide 0.6 mg/0.1 ml (18 mg/3 ml) subq pnij, pantoprazole, sucralfate, unifine pentips, albuterol, allopurinol,  atorvastatin, blood sugar diagnostic, blood-glucose meter, bupropion, clonidine, dapagliflozin propanediol, metoprolol succinate, miconazole nitrate 2 %, mupirocin, nitroglycerin, and ondansetron.    Review of Systems   Constitutional:  Negative for activity change, appetite change, fatigue and fever.   Respiratory:  Negative for shortness of breath.    Gastrointestinal:  Negative for abdominal pain.   Integumentary:  Negative for rash.         Health Maintenance Due   Topic Date Due    Shingles Vaccine (2 of 2) 08/04/2022    Pneumococcal Vaccines (Age 0-64) (2 of 2 - PCV) 03/08/2023    Influenza Vaccine (1) Never done    COVID-19 Vaccine (3 - 2023-24 season) 09/01/2023      Health Maintenance reviewed and discussed- No vaccines desired today- ordered regular labs including diabetes monitoring labs UTD on foot and eye exam.   Objective:      Physical Exam  Vitals and nursing note reviewed.   Constitutional:       General: He is not in acute distress.     Appearance: He is not ill-appearing.   Cardiovascular:      Rate and Rhythm: Normal rate and regular rhythm.      Heart sounds: No murmur heard.  Pulmonary:      Effort: Pulmonary effort is normal.      Breath sounds: Normal breath sounds. No wheezing.   Skin:     General: Skin is warm and dry.      Findings: No rash.   Neurological:      Mental Status: He is alert.   Psychiatric:         Mood and Affect: Mood normal.         Behavior: Behavior normal.         Assessment:       1. Nausea    2. Essential hypertension, onset before 1987    3. Depression, unspecified depression type    4. Type 2 diabetes mellitus without complication, without long-term current use of insulin    5. Cardiovascular event risk, ASCVD 10-year risk 8.7%, 2021    6. Family history of premature CAD    7. Idiopathic chronic gout of multiple sites without tophus    8. Chronic cough    9. Type 2 diabetes mellitus with diabetic polyneuropathy, without long-term current use of insulin        Plan:        1. Nausea  -     ondansetron (ZOFRAN-ODT) 4 MG TbDL; Take 1 tablet (4 mg total) by mouth every 8 (eight) hours as needed (nausea).  Dispense: 30 tablet; Refill: 1    2. Essential hypertension, onset before 1987  Assessment & Plan:  Stable. Well controlled. Continue current medications.  Stable      Orders:  -     metoprolol succinate (TOPROL-XL) 50 MG 24 hr tablet; Take 1 tablet (50 mg total) by mouth once daily.  Dispense: 90 tablet; Refill: 3  -     cloNIDine (CATAPRES) 0.2 MG tablet; Take 1 tablet (0.2 mg total) by mouth once daily.  Dispense: 90 tablet; Refill: 3    3. Depression, unspecified depression type  Assessment & Plan:  Stable. Well controlled. Continue current medications.       Orders:  -     buPROPion (WELLBUTRIN XL) 150 MG TB24 tablet; Take 1 tablet (150 mg total) by mouth once daily.  Dispense: 90 tablet; Refill: 3    4. Type 2 diabetes mellitus without complication, without long-term current use of insulin  -     atorvastatin (LIPITOR) 10 MG tablet; Take 1 tablet (10 mg total) by mouth once daily.  Dispense: 90 tablet; Refill: 3  -     Hemoglobin A1C; Future; Expected date: 07/24/2024    5. Cardiovascular event risk, ASCVD 10-year risk 8.7%, 2021  Assessment & Plan:  Stable. Well controlled. Continue current medications.       Orders:  -     atorvastatin (LIPITOR) 10 MG tablet; Take 1 tablet (10 mg total) by mouth once daily.  Dispense: 90 tablet; Refill: 3    6. Family history of premature CAD  -     atorvastatin (LIPITOR) 10 MG tablet; Take 1 tablet (10 mg total) by mouth once daily.  Dispense: 90 tablet; Refill: 3    7. Idiopathic chronic gout of multiple sites without tophus  Assessment & Plan:  Stable. Well controlled. Continue current medications.       Orders:  -     allopurinoL (ZYLOPRIM) 100 MG tablet; Take 1 tablet (100 mg total) by mouth once daily.  Dispense: 90 tablet; Refill: 3    8. Chronic cough  -     albuterol (VENTOLIN HFA) 90 mcg/actuation inhaler; Inhale 2 puffs into the  lungs every 6 (six) hours as needed for Wheezing. Rescue  Dispense: 18 g; Refill: 0    9. Type 2 diabetes mellitus with diabetic polyneuropathy, without long-term current use of insulin  Assessment & Plan:  Stable. Well controlled. Continue current medications.       Orders:  -     dapagliflozin propanediol (FARXIGA) 5 mg Tab tablet; Take 1 tablet (5 mg total) by mouth once daily.  Dispense: 30 tablet; Refill: 11

## 2024-07-11 ENCOUNTER — LAB VISIT (OUTPATIENT)
Dept: LAB | Facility: HOSPITAL | Age: 54
End: 2024-07-11
Attending: FAMILY MEDICINE
Payer: MEDICAID

## 2024-07-11 DIAGNOSIS — E11.9 TYPE 2 DIABETES MELLITUS WITHOUT COMPLICATION, WITHOUT LONG-TERM CURRENT USE OF INSULIN: ICD-10-CM

## 2024-07-11 LAB
ESTIMATED AVG GLUCOSE: 134 MG/DL (ref 68–131)
HBA1C MFR BLD: 6.3 % (ref 4–5.6)

## 2024-07-11 PROCEDURE — 36415 COLL VENOUS BLD VENIPUNCTURE: CPT | Performed by: FAMILY MEDICINE

## 2024-07-11 PROCEDURE — 83036 HEMOGLOBIN GLYCOSYLATED A1C: CPT | Performed by: FAMILY MEDICINE

## 2024-07-17 ENCOUNTER — OFFICE VISIT (OUTPATIENT)
Dept: FAMILY MEDICINE | Facility: CLINIC | Age: 54
End: 2024-07-17
Payer: MEDICAID

## 2024-07-17 VITALS
RESPIRATION RATE: 18 BRPM | SYSTOLIC BLOOD PRESSURE: 134 MMHG | BODY MASS INDEX: 46.67 KG/M2 | WEIGHT: 297.38 LBS | OXYGEN SATURATION: 99 % | DIASTOLIC BLOOD PRESSURE: 84 MMHG | HEART RATE: 96 BPM | HEIGHT: 67 IN

## 2024-07-17 DIAGNOSIS — R14.2 BELCHING: ICD-10-CM

## 2024-07-17 DIAGNOSIS — Z12.5 SCREENING FOR PROSTATE CANCER: ICD-10-CM

## 2024-07-17 DIAGNOSIS — E66.01 CLASS 3 SEVERE OBESITY DUE TO EXCESS CALORIES WITH SERIOUS COMORBIDITY AND BODY MASS INDEX (BMI) OF 50.0 TO 59.9 IN ADULT: ICD-10-CM

## 2024-07-17 DIAGNOSIS — K21.9 GASTROESOPHAGEAL REFLUX DISEASE, UNSPECIFIED WHETHER ESOPHAGITIS PRESENT: ICD-10-CM

## 2024-07-17 DIAGNOSIS — Z13.6 ENCOUNTER FOR LIPID SCREENING FOR CARDIOVASCULAR DISEASE: ICD-10-CM

## 2024-07-17 DIAGNOSIS — Z13.220 ENCOUNTER FOR LIPID SCREENING FOR CARDIOVASCULAR DISEASE: ICD-10-CM

## 2024-07-17 DIAGNOSIS — Z91.89 CARDIOVASCULAR EVENT RISK: ICD-10-CM

## 2024-07-17 DIAGNOSIS — E11.42 TYPE 2 DIABETES MELLITUS WITH DIABETIC POLYNEUROPATHY, WITHOUT LONG-TERM CURRENT USE OF INSULIN: Primary | ICD-10-CM

## 2024-07-17 DIAGNOSIS — G47.33 OSA ON CPAP: ICD-10-CM

## 2024-07-17 DIAGNOSIS — I10 ESSENTIAL HYPERTENSION: ICD-10-CM

## 2024-07-17 PROCEDURE — 1159F MED LIST DOCD IN RCRD: CPT | Mod: CPTII,,, | Performed by: FAMILY MEDICINE

## 2024-07-17 PROCEDURE — 3008F BODY MASS INDEX DOCD: CPT | Mod: CPTII,,, | Performed by: FAMILY MEDICINE

## 2024-07-17 PROCEDURE — 1160F RVW MEDS BY RX/DR IN RCRD: CPT | Mod: CPTII,,, | Performed by: FAMILY MEDICINE

## 2024-07-17 PROCEDURE — 3044F HG A1C LEVEL LT 7.0%: CPT | Mod: CPTII,,, | Performed by: FAMILY MEDICINE

## 2024-07-17 PROCEDURE — 99214 OFFICE O/P EST MOD 30 MIN: CPT | Mod: S$PBB,,, | Performed by: FAMILY MEDICINE

## 2024-07-17 PROCEDURE — 99214 OFFICE O/P EST MOD 30 MIN: CPT | Mod: PBBFAC,PN | Performed by: FAMILY MEDICINE

## 2024-07-17 PROCEDURE — 3080F DIAST BP >= 90 MM HG: CPT | Mod: CPTII,,, | Performed by: FAMILY MEDICINE

## 2024-07-17 PROCEDURE — 99999 PR PBB SHADOW E&M-EST. PATIENT-LVL IV: CPT | Mod: PBBFAC,,, | Performed by: FAMILY MEDICINE

## 2024-07-17 PROCEDURE — 3061F NEG MICROALBUMINURIA REV: CPT | Mod: CPTII,,, | Performed by: FAMILY MEDICINE

## 2024-07-17 PROCEDURE — 3075F SYST BP GE 130 - 139MM HG: CPT | Mod: CPTII,,, | Performed by: FAMILY MEDICINE

## 2024-07-17 PROCEDURE — 3066F NEPHROPATHY DOC TX: CPT | Mod: CPTII,,, | Performed by: FAMILY MEDICINE

## 2024-07-17 RX ORDER — PANTOPRAZOLE SODIUM 40 MG/1
40 TABLET, DELAYED RELEASE ORAL 2 TIMES DAILY
Qty: 60 TABLET | Refills: 11 | Status: SHIPPED | OUTPATIENT
Start: 2024-07-17

## 2024-07-17 RX ORDER — DICYCLOMINE HYDROCHLORIDE 10 MG/1
CAPSULE ORAL
Qty: 120 CAPSULE | Refills: 1 | Status: SHIPPED | OUTPATIENT
Start: 2024-07-17

## 2024-07-17 RX ORDER — SUCRALFATE 1 G/1
1 TABLET ORAL 3 TIMES DAILY PRN
Qty: 90 TABLET | Refills: 11 | Status: SHIPPED | OUTPATIENT
Start: 2024-07-17

## 2024-07-17 NOTE — PROGRESS NOTES
Subjective:       Patient ID: Nick Tam is a 54 y.o. male.    Chief Complaint: Follow-up (Med refill )    Presents today to follow up chronic conditions.   He complains only of left ankle pain. He is still having tingling in his feet. He was taking gabapentin and it was not doing anything for him. He does have some tingling in his feet at times and he will hit his feet together and the sensation will come back.     He does not monitor sugar regularly but has been well controlled.     Left ankle- tore a ligament many years ago and when he gets a lot of weather changes or when gout flares up he will have the chronic ankle pain. He needs his handicap tag renewed in October.     Wt Readings from Last 6 Encounters:  07/17/24 : 134.9 kg (297 lb 6.4 oz)  04/24/24 : (!) 137.4 kg (303 lb)  01/25/24 : 135.4 kg (298 lb 8 oz)  09/11/23 : 132.5 kg (292 lb)  07/01/23 : 136.1 kg (300 lb)  04/21/23 : (!) 137.6 kg (303 lb 6.4 oz)    He lost 4 lbs since Monday last week.         Follow-up  Pertinent negatives include no abdominal pain, fatigue, fever or rash.       .  Patient Active Problem List   Diagnosis    Chest pain, onset 9/2020    Belching, chronic    Symptoms of gastroesophageal reflux    Type 2 diabetes mellitus with diabetic polyneuropathy, without long-term current use of insulin    Essential hypertension, onset before 1987    Class 3 severe obesity due to excess calories with serious comorbidity and body mass index (BMI) of 50.0 to 59.9 in adult    Literacy level of illiterate    Sedentary lifestyle, onset 2018    Chronic gout of multiple sites    Anemia    Hypoalbuminemia    Cardiovascular event risk, ASCVD 10-year risk 8.7%, 2021    Family history of premature CAD    Excessive daytime sleepiness    Family history of sleep apnea    Chronic fatigue    Gastroesophageal reflux disease    Depression    Chronic cough    COVID-19, 6/2021, no residual problem    Elevated ALT measurement    Fatty liver    Hiatal hernia     Hemorrhoids    Nausea    Abdominal pain    JORDAN on CPAP, 100% use    Obese abdomen     Nick has a current medication list which includes the following prescription(s): albuterol, allopurinol, atorvastatin, bupropion, clonidine, dapagliflozin propanediol, lancets, liraglutide 0.6 mg/0.1 ml (18 mg/3 ml) subq pnij, metoprolol succinate, ondansetron, unifine pentips, blood sugar diagnostic, blood-glucose meter, dicyclomine, miconazole nitrate 2 %, mupirocin, nitroglycerin, pantoprazole, and sucralfate.    Review of Systems   Constitutional:  Negative for activity change, appetite change, fatigue and fever.   Respiratory:  Negative for shortness of breath.    Gastrointestinal:  Negative for abdominal pain.   Integumentary:  Negative for rash.         Health Maintenance Due   Topic Date Due    PROSTATE-SPECIFIC ANTIGEN  Never done    Shingles Vaccine (2 of 2) 08/04/2022    Pneumococcal Vaccines (Age 0-64) (2 of 2 - PCV) 03/08/2023    COVID-19 Vaccine (3 - 2023-24 season) 09/01/2023    Eye Exam  09/14/2024      Health Maintenance reviewed and discussed- Will be due for eye exam in September. .   Objective:      Physical Exam  Vitals and nursing note reviewed.   Constitutional:       General: He is not in acute distress.     Appearance: He is not ill-appearing.   Cardiovascular:      Rate and Rhythm: Normal rate and regular rhythm.      Heart sounds: No murmur heard.  Pulmonary:      Effort: Pulmonary effort is normal.      Breath sounds: Normal breath sounds. No wheezing.   Skin:     General: Skin is warm and dry.      Findings: No rash.   Neurological:      Mental Status: He is alert.   Psychiatric:         Mood and Affect: Mood normal.         Behavior: Behavior normal.         Assessment:       1. Type 2 diabetes mellitus with diabetic polyneuropathy, without long-term current use of insulin    2. Class 3 severe obesity due to excess calories with serious comorbidity and body mass index (BMI) of 50.0 to 59.9 in adult     3. JORDAN on CPAP, 100% use    4. Essential hypertension, onset before 1987    5. Cardiovascular event risk, ASCVD 10-year risk 8.7%, 2021    6. Gastroesophageal reflux disease, unspecified whether esophagitis present    7. Belching    8. Encounter for lipid screening for cardiovascular disease    9. Screening for prostate cancer        Plan:       1. Type 2 diabetes mellitus with diabetic polyneuropathy, without long-term current use of insulin  Assessment & Plan:  Stable. Well controlled. Continue current medications.       Orders:  -     Hemoglobin A1C; Future; Expected date: 10/17/2024    2. Class 3 severe obesity due to excess calories with serious comorbidity and body mass index (BMI) of 50.0 to 59.9 in adult  Assessment & Plan:  Stable. Well controlled. Continue current medications.         3. JORDAN on CPAP, 100% use  Assessment & Plan:  Wearing CPAP at times- struggling with sleeping with it at times.         4. Essential hypertension, onset before 1987  Assessment & Plan:  Stable. Well controlled. Continue current medications.       Orders:  -     Comprehensive Metabolic Panel; Future; Expected date: 10/17/2024  -     CBC Auto Differential; Future; Expected date: 10/17/2024    5. Cardiovascular event risk, ASCVD 10-year risk 8.7%, 2021  Assessment & Plan:  Stable. Well controlled. Continue current medications.         6. Gastroesophageal reflux disease, unspecified whether esophagitis present  -     pantoprazole (PROTONIX) 40 MG tablet; Take 1 tablet (40 mg total) by mouth 2 (two) times daily.  Dispense: 60 tablet; Refill: 11  -     sucralfate (CARAFATE) 1 gram tablet; Take 1 tablet (1 g total) by mouth 3 (three) times daily as needed (acid reflux).  Dispense: 90 tablet; Refill: 11    7. Belching  -     pantoprazole (PROTONIX) 40 MG tablet; Take 1 tablet (40 mg total) by mouth 2 (two) times daily.  Dispense: 60 tablet; Refill: 11  -     sucralfate (CARAFATE) 1 gram tablet; Take 1 tablet (1 g total) by mouth 3  (three) times daily as needed (acid reflux).  Dispense: 90 tablet; Refill: 11    8. Encounter for lipid screening for cardiovascular disease  -     Lipid Panel; Future; Expected date: 10/17/2024    9. Screening for prostate cancer  -     PSA, Screening; Future; Expected date: 10/17/2024    Other orders  -     dicyclomine (BENTYL) 10 MG capsule; TAKE ONE CAPSULE BY MOUTH FOUR TIMES DAILY **THANK YOU**  Dispense: 120 capsule; Refill: 1

## 2024-07-26 ENCOUNTER — HOSPITAL ENCOUNTER (EMERGENCY)
Facility: HOSPITAL | Age: 54
Discharge: HOME | End: 2024-07-26
Attending: STUDENT IN AN ORGANIZED HEALTH CARE EDUCATION/TRAINING PROGRAM
Payer: MEDICAID

## 2024-07-26 VITALS
SYSTOLIC BLOOD PRESSURE: 125 MMHG | DIASTOLIC BLOOD PRESSURE: 73 MMHG | WEIGHT: 296 LBS | HEIGHT: 71 IN | BODY MASS INDEX: 41.44 KG/M2 | TEMPERATURE: 98 F | OXYGEN SATURATION: 96 % | RESPIRATION RATE: 12 BRPM | HEART RATE: 68 BPM

## 2024-07-26 DIAGNOSIS — R07.89 ATYPICAL CHEST PAIN: Primary | ICD-10-CM

## 2024-07-26 DIAGNOSIS — R07.9 CHEST PAIN: ICD-10-CM

## 2024-07-26 LAB
ALBUMIN SERPL BCP-MCNC: 3.8 G/DL (ref 3.5–5.2)
ALP SERPL-CCNC: 108 U/L (ref 55–135)
ALT SERPL W/O P-5'-P-CCNC: 29 U/L (ref 10–44)
ANION GAP SERPL CALC-SCNC: 10 MMOL/L (ref 8–16)
AST SERPL-CCNC: 21 U/L (ref 10–40)
BASOPHILS # BLD AUTO: 0.09 K/UL (ref 0–0.2)
BASOPHILS NFR BLD: 1 % (ref 0–1.9)
BILIRUB SERPL-MCNC: 0.5 MG/DL (ref 0.1–1)
BNP SERPL-MCNC: <10 PG/ML (ref 0–99)
BUN SERPL-MCNC: 17 MG/DL (ref 6–20)
CALCIUM SERPL-MCNC: 9.7 MG/DL (ref 8.7–10.5)
CHLORIDE SERPL-SCNC: 106 MMOL/L (ref 95–110)
CO2 SERPL-SCNC: 25 MMOL/L (ref 23–29)
CREAT SERPL-MCNC: 1.2 MG/DL (ref 0.5–1.4)
DIFFERENTIAL METHOD BLD: ABNORMAL
EOSINOPHIL # BLD AUTO: 0.1 K/UL (ref 0–0.5)
EOSINOPHIL NFR BLD: 1.6 % (ref 0–8)
ERYTHROCYTE [DISTWIDTH] IN BLOOD BY AUTOMATED COUNT: 12.3 % (ref 11.5–14.5)
EST. GFR  (NO RACE VARIABLE): >60 ML/MIN/1.73 M^2
GLUCOSE SERPL-MCNC: 129 MG/DL (ref 70–110)
HCT VFR BLD AUTO: 47.6 % (ref 40–54)
HGB BLD-MCNC: 16 G/DL (ref 14–18)
IMM GRANULOCYTES # BLD AUTO: 0.01 K/UL (ref 0–0.04)
IMM GRANULOCYTES NFR BLD AUTO: 0.1 % (ref 0–0.5)
LYMPHOCYTES # BLD AUTO: 2.8 K/UL (ref 1–4.8)
LYMPHOCYTES NFR BLD: 32.8 % (ref 18–48)
MAGNESIUM SERPL-MCNC: 1.8 MG/DL (ref 1.6–2.6)
MCH RBC QN AUTO: 31.1 PG (ref 27–31)
MCHC RBC AUTO-ENTMCNC: 33.6 G/DL (ref 32–36)
MCV RBC AUTO: 93 FL (ref 82–98)
MONOCYTES # BLD AUTO: 0.9 K/UL (ref 0.3–1)
MONOCYTES NFR BLD: 10.7 % (ref 4–15)
NEUTROPHILS # BLD AUTO: 4.6 K/UL (ref 1.8–7.7)
NEUTROPHILS NFR BLD: 53.8 % (ref 38–73)
NRBC BLD-RTO: 0 /100 WBC
OHS QRS DURATION: 92 MS
OHS QTC CALCULATION: 429 MS
PLATELET # BLD AUTO: 186 K/UL (ref 150–450)
PMV BLD AUTO: 9.1 FL (ref 9.2–12.9)
POTASSIUM SERPL-SCNC: 3.7 MMOL/L (ref 3.5–5.1)
PROT SERPL-MCNC: 7.8 G/DL (ref 6–8.4)
RBC # BLD AUTO: 5.14 M/UL (ref 4.6–6.2)
SODIUM SERPL-SCNC: 141 MMOL/L (ref 136–145)
TROPONIN I SERPL DL<=0.01 NG/ML-MCNC: 0.01 NG/ML (ref 0–0.03)
TROPONIN I SERPL DL<=0.01 NG/ML-MCNC: <0.006 NG/ML (ref 0–0.03)
WBC # BLD AUTO: 8.59 K/UL (ref 3.9–12.7)

## 2024-07-26 PROCEDURE — 84484 ASSAY OF TROPONIN QUANT: CPT | Performed by: EMERGENCY MEDICINE

## 2024-07-26 PROCEDURE — 96374 THER/PROPH/DIAG INJ IV PUSH: CPT

## 2024-07-26 PROCEDURE — 25000003 PHARM REV CODE 250: Performed by: STUDENT IN AN ORGANIZED HEALTH CARE EDUCATION/TRAINING PROGRAM

## 2024-07-26 PROCEDURE — 99285 EMERGENCY DEPT VISIT HI MDM: CPT | Mod: 25

## 2024-07-26 PROCEDURE — 63600175 PHARM REV CODE 636 W HCPCS: Mod: UD | Performed by: STUDENT IN AN ORGANIZED HEALTH CARE EDUCATION/TRAINING PROGRAM

## 2024-07-26 PROCEDURE — 84484 ASSAY OF TROPONIN QUANT: CPT | Mod: 91 | Performed by: STUDENT IN AN ORGANIZED HEALTH CARE EDUCATION/TRAINING PROGRAM

## 2024-07-26 PROCEDURE — 71045 X-RAY EXAM CHEST 1 VIEW: CPT | Mod: 26,,, | Performed by: RADIOLOGY

## 2024-07-26 PROCEDURE — 36415 COLL VENOUS BLD VENIPUNCTURE: CPT | Performed by: EMERGENCY MEDICINE

## 2024-07-26 PROCEDURE — 93010 ELECTROCARDIOGRAM REPORT: CPT | Mod: ,,, | Performed by: INTERNAL MEDICINE

## 2024-07-26 PROCEDURE — 96375 TX/PRO/DX INJ NEW DRUG ADDON: CPT

## 2024-07-26 PROCEDURE — 83880 ASSAY OF NATRIURETIC PEPTIDE: CPT | Performed by: STUDENT IN AN ORGANIZED HEALTH CARE EDUCATION/TRAINING PROGRAM

## 2024-07-26 PROCEDURE — 85025 COMPLETE CBC W/AUTO DIFF WBC: CPT | Performed by: STUDENT IN AN ORGANIZED HEALTH CARE EDUCATION/TRAINING PROGRAM

## 2024-07-26 PROCEDURE — 93005 ELECTROCARDIOGRAM TRACING: CPT

## 2024-07-26 PROCEDURE — 83735 ASSAY OF MAGNESIUM: CPT | Performed by: STUDENT IN AN ORGANIZED HEALTH CARE EDUCATION/TRAINING PROGRAM

## 2024-07-26 PROCEDURE — 71045 X-RAY EXAM CHEST 1 VIEW: CPT | Mod: TC

## 2024-07-26 PROCEDURE — 25000003 PHARM REV CODE 250: Performed by: EMERGENCY MEDICINE

## 2024-07-26 PROCEDURE — 80053 COMPREHEN METABOLIC PANEL: CPT | Performed by: STUDENT IN AN ORGANIZED HEALTH CARE EDUCATION/TRAINING PROGRAM

## 2024-07-26 RX ORDER — FAMOTIDINE 10 MG/ML
40 INJECTION INTRAVENOUS
Status: DISCONTINUED | OUTPATIENT
Start: 2024-07-26 | End: 2024-07-26

## 2024-07-26 RX ORDER — ALUMINUM HYDROXIDE, MAGNESIUM HYDROXIDE, AND SIMETHICONE 1200; 120; 1200 MG/30ML; MG/30ML; MG/30ML
30 SUSPENSION ORAL ONCE
Status: DISCONTINUED | OUTPATIENT
Start: 2024-07-26 | End: 2024-07-26 | Stop reason: HOSPADM

## 2024-07-26 RX ORDER — LIDOCAINE HYDROCHLORIDE 20 MG/ML
SOLUTION OROPHARYNGEAL
Status: DISCONTINUED
Start: 2024-07-26 | End: 2024-07-26 | Stop reason: HOSPADM

## 2024-07-26 RX ORDER — PANTOPRAZOLE SODIUM 40 MG/10ML
40 INJECTION, POWDER, LYOPHILIZED, FOR SOLUTION INTRAVENOUS
Status: COMPLETED | OUTPATIENT
Start: 2024-07-26 | End: 2024-07-26

## 2024-07-26 RX ORDER — ONDANSETRON HYDROCHLORIDE 2 MG/ML
4 INJECTION, SOLUTION INTRAVENOUS
Status: COMPLETED | OUTPATIENT
Start: 2024-07-26 | End: 2024-07-26

## 2024-07-26 RX ORDER — ALUMINUM HYDROXIDE, MAGNESIUM HYDROXIDE, AND SIMETHICONE 1200; 120; 1200 MG/30ML; MG/30ML; MG/30ML
30 SUSPENSION ORAL
Status: DISCONTINUED | OUTPATIENT
Start: 2024-07-26 | End: 2024-07-26 | Stop reason: HOSPADM

## 2024-07-26 RX ORDER — LIDOCAINE HYDROCHLORIDE 20 MG/ML
15 SOLUTION OROPHARYNGEAL ONCE
Status: COMPLETED | OUTPATIENT
Start: 2024-07-26 | End: 2024-07-26

## 2024-07-26 RX ADMIN — ONDANSETRON 4 MG: 2 INJECTION INTRAMUSCULAR; INTRAVENOUS at 06:07

## 2024-07-26 RX ADMIN — ALUMINUM HYDROXIDE, MAGNESIUM HYDROXIDE, AND SIMETHICONE 30 ML: 1200; 120; 1200 SUSPENSION ORAL at 06:07

## 2024-07-26 RX ADMIN — PANTOPRAZOLE SODIUM 40 MG: 40 INJECTION, POWDER, FOR SOLUTION INTRAVENOUS at 05:07

## 2024-07-26 RX ADMIN — LIDOCAINE HYDROCHLORIDE 15 ML: 20 SOLUTION ORAL at 10:07

## 2024-07-26 RX ADMIN — ALUMINUM HYDROXIDE, MAGNESIUM HYDROXIDE, AND SIMETHICONE 30 ML: 1200; 120; 1200 SUSPENSION ORAL at 10:07

## 2024-07-26 NOTE — ED PROVIDER NOTES
"Encounter Date: 7/26/2024       History     Chief Complaint   Patient presents with    Chest Pain     Pt states " shortness of breath, chest pain, cold sweat and indigestion" pt states "I was sitting watching tv I couldn't;t catch my breath, the indigestion started first"symptoms started at approx 0400   Pt reports mid chest pain, pt states " it's about gone now" cp non radiating     54-year-old male with significant history of obesity, hypertension, diabetes, GERD.  Presents to ED with chief complaints of left-sided pressure type chest pain with onset just prior to arrival.  Tells me that he was sitting up watching TV when chest pain started. Reports pain was at 8/10 new onset currently easing, and about 6/10. Reports associated belching, nausea and diaphoresis. He ate pizza for dinner last night.    He tells me a similar episode happened about 3 years ago and for which was seen evaluated by Cardiology Dr. Templeton, had a stress echo done which negative for coronary ischemia, EF 55%, left atrial enlargement.     The history is provided by the patient.     Review of patient's allergies indicates:   Allergen Reactions    Shrimp Shortness Of Breath    Codeine     Pcn [penicillins]      Past Medical History:   Diagnosis Date    Diabetes mellitus     GERD (gastroesophageal reflux disease)     Gout     Hypertension     Obesity      Past Surgical History:   Procedure Laterality Date    CHOLECYSTECTOMY      COLONOSCOPY N/A 7/28/2021    Procedure: COLONOSCOPY;  Surgeon: Joey Portillo MD;  Location: [facility];  Service: Endoscopy;  Laterality: N/A;    ESOPHAGOGASTRODUODENOSCOPY N/A 7/28/2021    Procedure: EGD (ESOPHAGOGASTRODUODENOSCOPY);  Surgeon: Joey Portillo MD;  Location: [facility];  Service: Endoscopy;  Laterality: N/A;     No family history on file.  Social History     Tobacco Use    Smoking status: Never    Smokeless tobacco: Never   Substance Use Topics    Alcohol use: Not Currently    Drug use: Never     Review of " Systems   Constitutional: Negative.    HENT: Negative.     Eyes: Negative.    Respiratory: Negative.     Cardiovascular:  Positive for chest pain.   Gastrointestinal: Negative.    Endocrine: Negative.    Genitourinary: Negative.    Musculoskeletal: Negative.    Skin: Negative.    Neurological: Negative.    Hematological: Negative.    Psychiatric/Behavioral: Negative.     All other systems reviewed and are negative.      Physical Exam     Initial Vitals [07/26/24 0544]   BP Pulse Resp Temp SpO2   (!) 158/106 77 20 98.3 °F (36.8 °C) 97 %      MAP       --         Physical Exam    Nursing note and vitals reviewed.  Constitutional: He appears well-developed.   Anxious   HENT:   Head: Normocephalic.   Eyes: Pupils are equal, round, and reactive to light.   Neck: No JVD present.   Normal range of motion.  Cardiovascular:  Normal rate.           Pulmonary/Chest: Breath sounds normal. No respiratory distress. He has no wheezes. He has no rales.   Abdominal: Abdomen is soft. Bowel sounds are normal.   Obese abdomen   Musculoskeletal:         General: Normal range of motion.      Cervical back: Normal range of motion.     Lymphadenopathy:     He has no cervical adenopathy.   Neurological: He is alert and oriented to person, place, and time. He has normal strength. GCS score is 15. GCS eye subscore is 4. GCS verbal subscore is 5. GCS motor subscore is 6.   Skin: Skin is warm. Capillary refill takes less than 2 seconds.         ED Course   Procedures  Labs Reviewed   CBC W/ AUTO DIFFERENTIAL - Abnormal       Result Value    WBC 8.59      RBC 5.14      Hemoglobin 16.0      Hematocrit 47.6      MCV 93      MCH 31.1 (*)     MCHC 33.6      RDW 12.3      Platelets 186      MPV 9.1 (*)     Immature Granulocytes 0.1      Gran # (ANC) 4.6      Immature Grans (Abs) 0.01      Lymph # 2.8      Mono # 0.9      Eos # 0.1      Baso # 0.09      nRBC 0      Gran % 53.8      Lymph % 32.8      Mono % 10.7      Eosinophil % 1.6      Basophil %  1.0      Differential Method Automated     COMPREHENSIVE METABOLIC PANEL - Abnormal    Sodium 141      Potassium 3.7      Chloride 106      CO2 25      Glucose 129 (*)     BUN 17      Creatinine 1.2      Calcium 9.7      Total Protein 7.8      Albumin 3.8      Total Bilirubin 0.5      Alkaline Phosphatase 108      AST 21      ALT 29      eGFR >60.0      Anion Gap 10     TROPONIN I    Troponin I <0.006     B-TYPE NATRIURETIC PEPTIDE    BNP <10     MAGNESIUM    Magnesium 1.8     TROPONIN I          Imaging Results              X-Ray Chest AP Portable (In process)  Result time 07/26/24 08:12:36      Wet Read by Lm Esparza MD (07/26/24 07:31:54, [facility] Emergency Dept, Emergency Medicine)    Chest x-ray, personally reviewed by me shows clear lungs bilaterally.  No pneumonia or pneumothorax.  No effusion.  Normal cardiac silhouette, normal skeletal structures.                                     Medications   aluminum-magnesium hydroxide-simethicone 200-200-20 mg/5 mL suspension 30 mL (30 mLs Oral Given 7/26/24 1007)   aluminum-magnesium hydroxide-simethicone 200-200-20 mg/5 mL suspension 30 mL (has no administration in time range)     And   LIDOcaine viscous HCl 2% oral solution 15 mL (15 mLs Oral Given 7/26/24 1008)   pantoprazole injection 40 mg (40 mg Intravenous Given by Other 7/26/24 0556)   ondansetron injection 4 mg (4 mg Intravenous Given by Other 7/26/24 0606)     Medical Decision Making  54-year-old male with chest pain.  Ddx ACS, anxiety, GERD, PE, pneumonia, pneumothorax, others  EKG shows normal sinus rhythm, no STEMI.  No gross evidence of coronary ischemia.  Rest of cardiac workup pending.    Case signed out to oncoming attending Dr. Esparza at shift change who will follow up with rest of workup and make disposition.    Dr. Esparza:  Patient care assumed at shift change.  Patient had begun experiencing epigastric pain radiating into the chest at about 4:00 a.m..  Initial troponin normal, 2nd  troponin done approximately 4 hours after onset of symptoms was normal as well.  EKG shows no obvious STEMI or ischemic changes.  Heart score is 3.   Patient was belching quite a bit.  States he has zero pain.  Will repeat his GI cocktail.  He had also been given pantoprazole prior to my assumption of care.  I do not believe patient's symptoms are cardiac in origin. Recommend that he follow-up with both his gastroenterologist and cardiologist.  Spink diet for the next several days etc..  DC home, return here as needed or if worse in any way.    Amount and/or Complexity of Data Reviewed  Labs: ordered.  Radiology: ordered and independent interpretation performed.    Risk  OTC drugs.  Prescription drug management.      Additional MDM:   Heart Score:    History:          Moderately suspicious.  ECG:             Normal  Age:               45-65 years  Risk factors: 1-2 risk factors  Troponin:       Less than or equal to normal limit  Heart Score = 3                                       Clinical Impression:  Final diagnoses:  [R07.9] Chest pain  [R07.89] Atypical chest pain (Primary)          ED Disposition Condition    Discharge Stable          ED Prescriptions    None       Follow-up Information       Follow up With Specialties Details Why Contact Info    Floridalma Henry MD Family Medicine Call   [address]  [address]  [place] [address] [phone]      Your cardiologist  Call today      Your gastroenterologist  Call today      [facility] Emergency Dept Emergency Medicine  As needed, If symptoms worsen [address] [address] [phone]             Lm Esparza MD  07/26/24 5448

## 2024-07-26 NOTE — DISCHARGE INSTRUCTIONS
As we discussed, follow a bland diet.  Avoid any spicy or acidic foods.  Coffee, fruit juices, etc. are probably best be avoided for now.  No red sauce, Ketchup, etc..  Take a daily acid reduce her such as Pepcid,  Nexium, , etc. Follow up with your primary care provider, your gastroenterologist, and your cardiologist.  Return here as needed or if worse in any way.

## 2024-10-11 ENCOUNTER — LAB VISIT (OUTPATIENT)
Dept: LAB | Facility: HOSPITAL | Age: 54
End: 2024-10-11
Attending: FAMILY MEDICINE
Payer: MEDICAID

## 2024-10-11 DIAGNOSIS — Z13.220 ENCOUNTER FOR LIPID SCREENING FOR CARDIOVASCULAR DISEASE: ICD-10-CM

## 2024-10-11 DIAGNOSIS — Z12.5 SCREENING FOR PROSTATE CANCER: ICD-10-CM

## 2024-10-11 DIAGNOSIS — I10 ESSENTIAL HYPERTENSION: ICD-10-CM

## 2024-10-11 DIAGNOSIS — Z13.6 ENCOUNTER FOR LIPID SCREENING FOR CARDIOVASCULAR DISEASE: ICD-10-CM

## 2024-10-11 DIAGNOSIS — E11.42 TYPE 2 DIABETES MELLITUS WITH DIABETIC POLYNEUROPATHY, WITHOUT LONG-TERM CURRENT USE OF INSULIN: ICD-10-CM

## 2024-10-11 LAB
ALBUMIN SERPL BCP-MCNC: 3.7 G/DL (ref 3.5–5.2)
ALP SERPL-CCNC: 95 U/L (ref 55–135)
ALT SERPL W/O P-5'-P-CCNC: 28 U/L (ref 10–44)
ANION GAP SERPL CALC-SCNC: 12 MMOL/L (ref 8–16)
AST SERPL-CCNC: 19 U/L (ref 10–40)
BASOPHILS # BLD AUTO: 0.09 K/UL (ref 0–0.2)
BASOPHILS NFR BLD: 1.1 % (ref 0–1.9)
BILIRUB SERPL-MCNC: 0.4 MG/DL (ref 0.1–1)
BUN SERPL-MCNC: 21 MG/DL (ref 6–20)
CALCIUM SERPL-MCNC: 9.7 MG/DL (ref 8.7–10.5)
CHLORIDE SERPL-SCNC: 108 MMOL/L (ref 95–110)
CHOLEST SERPL-MCNC: 175 MG/DL (ref 120–199)
CHOLEST/HDLC SERPL: 3.7 {RATIO} (ref 2–5)
CO2 SERPL-SCNC: 23 MMOL/L (ref 23–29)
COMPLEXED PSA SERPL-MCNC: 0.14 NG/ML (ref 0–4)
CREAT SERPL-MCNC: 1 MG/DL (ref 0.5–1.4)
DIFFERENTIAL METHOD BLD: ABNORMAL
EOSINOPHIL # BLD AUTO: 0.1 K/UL (ref 0–0.5)
EOSINOPHIL NFR BLD: 1.7 % (ref 0–8)
ERYTHROCYTE [DISTWIDTH] IN BLOOD BY AUTOMATED COUNT: 12.1 % (ref 11.5–14.5)
EST. GFR  (NO RACE VARIABLE): >60 ML/MIN/1.73 M^2
ESTIMATED AVG GLUCOSE: 140 MG/DL (ref 68–131)
GLUCOSE SERPL-MCNC: 157 MG/DL (ref 70–110)
HBA1C MFR BLD: 6.5 % (ref 4–5.6)
HCT VFR BLD AUTO: 47.2 % (ref 40–54)
HDLC SERPL-MCNC: 47 MG/DL (ref 40–75)
HDLC SERPL: 26.9 % (ref 20–50)
HGB BLD-MCNC: 15.6 G/DL (ref 14–18)
IMM GRANULOCYTES # BLD AUTO: 0.02 K/UL (ref 0–0.04)
IMM GRANULOCYTES NFR BLD AUTO: 0.2 % (ref 0–0.5)
LDLC SERPL CALC-MCNC: 101.4 MG/DL (ref 63–159)
LYMPHOCYTES # BLD AUTO: 2.3 K/UL (ref 1–4.8)
LYMPHOCYTES NFR BLD: 28.3 % (ref 18–48)
MCH RBC QN AUTO: 31.4 PG (ref 27–31)
MCHC RBC AUTO-ENTMCNC: 33.1 G/DL (ref 32–36)
MCV RBC AUTO: 95 FL (ref 82–98)
MONOCYTES # BLD AUTO: 0.7 K/UL (ref 0.3–1)
MONOCYTES NFR BLD: 8.6 % (ref 4–15)
NEUTROPHILS # BLD AUTO: 4.8 K/UL (ref 1.8–7.7)
NEUTROPHILS NFR BLD: 60.1 % (ref 38–73)
NONHDLC SERPL-MCNC: 128 MG/DL
NRBC BLD-RTO: 0 /100 WBC
PLATELET # BLD AUTO: 189 K/UL (ref 150–450)
PMV BLD AUTO: 9.3 FL (ref 9.2–12.9)
POTASSIUM SERPL-SCNC: 4.3 MMOL/L (ref 3.5–5.1)
PROT SERPL-MCNC: 7.4 G/DL (ref 6–8.4)
RBC # BLD AUTO: 4.97 M/UL (ref 4.6–6.2)
SODIUM SERPL-SCNC: 143 MMOL/L (ref 136–145)
TRIGL SERPL-MCNC: 133 MG/DL (ref 30–150)
WBC # BLD AUTO: 8.01 K/UL (ref 3.9–12.7)

## 2024-10-11 PROCEDURE — 80053 COMPREHEN METABOLIC PANEL: CPT | Performed by: FAMILY MEDICINE

## 2024-10-11 PROCEDURE — 36415 COLL VENOUS BLD VENIPUNCTURE: CPT | Performed by: FAMILY MEDICINE

## 2024-10-11 PROCEDURE — 84153 ASSAY OF PSA TOTAL: CPT | Performed by: FAMILY MEDICINE

## 2024-10-11 PROCEDURE — 80061 LIPID PANEL: CPT | Performed by: FAMILY MEDICINE

## 2024-10-11 PROCEDURE — 83036 HEMOGLOBIN GLYCOSYLATED A1C: CPT | Performed by: FAMILY MEDICINE

## 2024-10-11 PROCEDURE — 85025 COMPLETE CBC W/AUTO DIFF WBC: CPT | Performed by: FAMILY MEDICINE

## 2024-10-18 ENCOUNTER — OFFICE VISIT (OUTPATIENT)
Dept: FAMILY MEDICINE | Facility: CLINIC | Age: 54
End: 2024-10-18
Payer: MEDICAID

## 2024-10-18 VITALS
OXYGEN SATURATION: 96 % | BODY MASS INDEX: 41.09 KG/M2 | DIASTOLIC BLOOD PRESSURE: 88 MMHG | RESPIRATION RATE: 18 BRPM | SYSTOLIC BLOOD PRESSURE: 132 MMHG | WEIGHT: 293.5 LBS | HEIGHT: 71 IN | HEART RATE: 71 BPM

## 2024-10-18 DIAGNOSIS — E66.813 CLASS 3 SEVERE OBESITY DUE TO EXCESS CALORIES WITH SERIOUS COMORBIDITY AND BODY MASS INDEX (BMI) OF 40.0 TO 44.9 IN ADULT: ICD-10-CM

## 2024-10-18 DIAGNOSIS — E11.42 TYPE 2 DIABETES MELLITUS WITH DIABETIC POLYNEUROPATHY, WITHOUT LONG-TERM CURRENT USE OF INSULIN: Primary | ICD-10-CM

## 2024-10-18 DIAGNOSIS — E66.01 CLASS 3 SEVERE OBESITY DUE TO EXCESS CALORIES WITH SERIOUS COMORBIDITY AND BODY MASS INDEX (BMI) OF 40.0 TO 44.9 IN ADULT: ICD-10-CM

## 2024-10-18 DIAGNOSIS — K76.0 FATTY LIVER: ICD-10-CM

## 2024-10-18 PROCEDURE — 99999 PR PBB SHADOW E&M-EST. PATIENT-LVL IV: CPT | Mod: PBBFAC,,, | Performed by: FAMILY MEDICINE

## 2024-10-18 PROCEDURE — 99214 OFFICE O/P EST MOD 30 MIN: CPT | Mod: PBBFAC,PN | Performed by: FAMILY MEDICINE

## 2024-10-18 RX ORDER — GABAPENTIN 600 MG/1
600 TABLET ORAL NIGHTLY PRN
Qty: 30 TABLET | Refills: 3 | Status: SHIPPED | OUTPATIENT
Start: 2024-10-18

## 2024-10-18 NOTE — PROGRESS NOTES
Subjective:       Patient ID: Nick Tam is a 54 y.o. male.    Chief Complaint: Follow-up    Presents today to follow up chronic conditions.     He is still having tingling in his feet. He was taking gabapentin and it was not doing anything for him. He does have some tingling in his feet at times and he will hit his feet together and the sensation will come back.     He does not monitor sugar regularly but has been well controlled.     Left ankle- tore a ligament many years ago and when he gets a lot of weather changes or when gout flares up he will have the chronic ankle pain. He needs his handicap tag renewed in October.     Wt Readings from Last 6 Encounters:  07/17/24 : 134.9 kg (297 lb 6.4 oz)  04/24/24 : (!) 137.4 kg (303 lb)  01/25/24 : 135.4 kg (298 lb 8 oz)  09/11/23 : 132.5 kg (292 lb)  07/01/23 : 136.1 kg (300 lb)  04/21/23 : (!) 137.6 kg (303 lb 6.4 oz)    He lost 4 lbs since Monday last week.     Lab Results       Component                Value               Date                       HGBA1C                   6.5 (H)             10/11/2024                  Follow-up  Pertinent negatives include no abdominal pain, fatigue, fever or rash.       .  Patient Active Problem List   Diagnosis    Chest pain, onset 9/2020    Belching, chronic    Symptoms of gastroesophageal reflux    Type 2 diabetes mellitus with diabetic polyneuropathy, without long-term current use of insulin    Essential hypertension, onset before 1987    Class 3 severe obesity due to excess calories with serious comorbidity and body mass index (BMI) of 40.0 to 44.9 in adult    Literacy level of illiterate    Sedentary lifestyle, onset 2018    Chronic gout of multiple sites    Anemia    Hypoalbuminemia    Cardiovascular event risk, ASCVD 10-year risk 8.7%, 2021    Family history of premature CAD    Excessive daytime sleepiness    Family history of sleep apnea    Chronic fatigue    Gastroesophageal reflux disease    Depression    Chronic  cough    COVID-19, 6/2021, no residual problem    Elevated ALT measurement    Fatty liver    Hiatal hernia    Hemorrhoids    Nausea    Abdominal pain    JORDAN on CPAP, 100% use    Obese abdomen     Nick has a current medication list which includes the following prescription(s): albuterol, allopurinol, atorvastatin, blood sugar diagnostic, blood-glucose meter, bupropion, clonidine, dapagliflozin propanediol, dicyclomine, lancets, liraglutide 0.6 mg/0.1 ml (18 mg/3 ml) subq pnij, metoprolol succinate, miconazole nitrate 2 %, mupirocin, nitroglycerin, ondansetron, pantoprazole, sucralfate, unifine pentips, and gabapentin.    Review of Systems   Constitutional:  Negative for fatigue and fever.   Gastrointestinal:  Negative for abdominal pain.   Integumentary:  Negative for rash.         Health Maintenance Due   Topic Date Due    Shingles Vaccine (2 of 2) 08/04/2022    Pneumococcal Vaccines (Age 0-64) (2 of 2 - PCV) 03/08/2023    Influenza Vaccine (1) Never done    COVID-19 Vaccine (3 - 2024-25 season) 09/01/2024    Eye Exam  09/14/2024      Health Maintenance reviewed and discussed- Nothing additional desired.   Objective:      Physical Exam  Vitals and nursing note reviewed.   Constitutional:       General: He is not in acute distress.     Appearance: He is not ill-appearing.   Cardiovascular:      Rate and Rhythm: Normal rate and regular rhythm.      Heart sounds: No murmur heard.  Pulmonary:      Effort: Pulmonary effort is normal.      Breath sounds: Normal breath sounds. No wheezing.   Skin:     General: Skin is warm and dry.      Findings: No rash.   Neurological:      Mental Status: He is alert.   Psychiatric:         Mood and Affect: Mood normal.         Behavior: Behavior normal.         Assessment:       1. Type 2 diabetes mellitus with diabetic polyneuropathy, without long-term current use of insulin    2. Fatty liver    3. Class 3 severe obesity due to excess calories with serious comorbidity and body mass  index (BMI) of 40.0 to 44.9 in adult        Plan:       1. Type 2 diabetes mellitus with diabetic polyneuropathy, without long-term current use of insulin  Assessment & Plan:  Lab Results   Component Value Date    HGBA1C 6.5 (H) 10/11/2024     Stable. Well controlled. Continue current medications.       Orders:  -     gabapentin (NEURONTIN) 600 MG tablet; Take 1 tablet (600 mg total) by mouth nightly as needed (foot pain).  Dispense: 30 tablet; Refill: 3    2. Fatty liver  -     US Abdomen Limited; Future; Expected date: 10/18/2024    3. Class 3 severe obesity due to excess calories with serious comorbidity and body mass index (BMI) of 40.0 to 44.9 in adult  Assessment & Plan:  Weight improved to BMI 40.93

## 2024-10-18 NOTE — ASSESSMENT & PLAN NOTE
Lab Results   Component Value Date    HGBA1C 6.5 (H) 10/11/2024     Stable. Well controlled. Continue current medications.

## 2025-01-27 ENCOUNTER — OFFICE VISIT (OUTPATIENT)
Dept: FAMILY MEDICINE | Facility: CLINIC | Age: 55
End: 2025-01-27
Payer: MEDICAID

## 2025-01-27 VITALS
BODY MASS INDEX: 41.67 KG/M2 | HEART RATE: 73 BPM | WEIGHT: 297.63 LBS | OXYGEN SATURATION: 97 % | DIASTOLIC BLOOD PRESSURE: 88 MMHG | SYSTOLIC BLOOD PRESSURE: 132 MMHG | RESPIRATION RATE: 18 BRPM | HEIGHT: 71 IN

## 2025-01-27 DIAGNOSIS — F32.A DEPRESSION, UNSPECIFIED DEPRESSION TYPE: ICD-10-CM

## 2025-01-27 DIAGNOSIS — E11.42 TYPE 2 DIABETES MELLITUS WITH DIABETIC POLYNEUROPATHY, WITHOUT LONG-TERM CURRENT USE OF INSULIN: ICD-10-CM

## 2025-01-27 DIAGNOSIS — R11.0 NAUSEA: ICD-10-CM

## 2025-01-27 DIAGNOSIS — Z91.89 CARDIOVASCULAR EVENT RISK: ICD-10-CM

## 2025-01-27 DIAGNOSIS — E11.9 TYPE 2 DIABETES MELLITUS WITHOUT COMPLICATION, WITHOUT LONG-TERM CURRENT USE OF INSULIN: ICD-10-CM

## 2025-01-27 DIAGNOSIS — I10 ESSENTIAL HYPERTENSION: ICD-10-CM

## 2025-01-27 DIAGNOSIS — M1A.09X0 IDIOPATHIC CHRONIC GOUT OF MULTIPLE SITES WITHOUT TOPHUS: ICD-10-CM

## 2025-01-27 DIAGNOSIS — Z82.49 FAMILY HISTORY OF PREMATURE CAD: ICD-10-CM

## 2025-01-27 PROCEDURE — 1159F MED LIST DOCD IN RCRD: CPT | Mod: CPTII,,, | Performed by: FAMILY MEDICINE

## 2025-01-27 PROCEDURE — 3075F SYST BP GE 130 - 139MM HG: CPT | Mod: CPTII,,, | Performed by: FAMILY MEDICINE

## 2025-01-27 PROCEDURE — 99214 OFFICE O/P EST MOD 30 MIN: CPT | Mod: S$PBB,,, | Performed by: FAMILY MEDICINE

## 2025-01-27 PROCEDURE — 3079F DIAST BP 80-89 MM HG: CPT | Mod: CPTII,,, | Performed by: FAMILY MEDICINE

## 2025-01-27 PROCEDURE — 99213 OFFICE O/P EST LOW 20 MIN: CPT | Mod: PBBFAC,PN | Performed by: FAMILY MEDICINE

## 2025-01-27 PROCEDURE — 99999 PR PBB SHADOW E&M-EST. PATIENT-LVL III: CPT | Mod: PBBFAC,,, | Performed by: FAMILY MEDICINE

## 2025-01-27 PROCEDURE — 3008F BODY MASS INDEX DOCD: CPT | Mod: CPTII,,, | Performed by: FAMILY MEDICINE

## 2025-01-27 RX ORDER — LIRAGLUTIDE 6 MG/ML
1.8 INJECTION SUBCUTANEOUS DAILY
Qty: 27 ML | Refills: 3 | Status: SHIPPED | OUTPATIENT
Start: 2025-01-27 | End: 2026-01-27

## 2025-01-27 RX ORDER — ALLOPURINOL 100 MG/1
100 TABLET ORAL DAILY
Qty: 90 TABLET | Refills: 3 | Status: SHIPPED | OUTPATIENT
Start: 2025-01-27

## 2025-01-27 RX ORDER — ATORVASTATIN CALCIUM 10 MG/1
10 TABLET, FILM COATED ORAL DAILY
Qty: 90 TABLET | Refills: 3 | Status: SHIPPED | OUTPATIENT
Start: 2025-01-27 | End: 2026-01-22

## 2025-01-27 RX ORDER — GABAPENTIN 600 MG/1
600 TABLET ORAL NIGHTLY PRN
Qty: 30 TABLET | Refills: 3 | Status: SHIPPED | OUTPATIENT
Start: 2025-01-27

## 2025-01-27 RX ORDER — BUPROPION HYDROCHLORIDE 150 MG/1
150 TABLET ORAL DAILY
Qty: 90 TABLET | Refills: 3 | Status: SHIPPED | OUTPATIENT
Start: 2025-01-27

## 2025-01-27 RX ORDER — ONDANSETRON 4 MG/1
4 TABLET, ORALLY DISINTEGRATING ORAL EVERY 8 HOURS PRN
Qty: 30 TABLET | Refills: 1 | Status: SHIPPED | OUTPATIENT
Start: 2025-01-27

## 2025-01-27 RX ORDER — CLONIDINE HYDROCHLORIDE 0.2 MG/1
0.2 TABLET ORAL DAILY
Qty: 90 TABLET | Refills: 3 | Status: SHIPPED | OUTPATIENT
Start: 2025-01-27

## 2025-01-27 RX ORDER — METOPROLOL SUCCINATE 50 MG/1
50 TABLET, EXTENDED RELEASE ORAL DAILY
Qty: 90 TABLET | Refills: 3 | Status: SHIPPED | OUTPATIENT
Start: 2025-01-27

## 2025-01-27 RX ORDER — DICYCLOMINE HYDROCHLORIDE 10 MG/1
CAPSULE ORAL
Qty: 120 CAPSULE | Refills: 1 | Status: SHIPPED | OUTPATIENT
Start: 2025-01-27

## 2025-01-27 NOTE — PROGRESS NOTES
Subjective:       Patient ID: Nick Tam is a 54 y.o. male.    Chief Complaint: Follow-up    History of Present Illness    CHIEF COMPLAINT:  Mr. Tam presents today for follow-up of multiple chronic conditions.    DIABETES:  He reports well-controlled diabetes with fasting glucose levels typically ranging between 100-120 mg/dL. He checks blood sugar regularly at home, follows a diabetic-friendly diet, and denies episodes of hypoglycemia or hyperglycemia. He denies increased thirst, frequent urination, or vision changes.    MUSCULOSKELETAL:  He reports joint pain in shoulder, leg, and ankle, which he attributes to recent weather changes.    MEDICATIONS:  He requests refills for Lexapro and Zofran, which he uses for nausea management. He reports non-compliance with blood pressure medication.      ROS:  Eyes: -vision changes  Musculoskeletal: +joint pain  Endocrine: -excessive thirst         Review of Systems   Constitutional:  Negative for activity change, appetite change, fatigue and fever.   Respiratory:  Negative for shortness of breath.    Gastrointestinal:  Negative for abdominal pain.   Integumentary:  Negative for rash.       Patient Active Problem List   Diagnosis    Chest pain, onset 9/2020    Belching, chronic    Symptoms of gastroesophageal reflux    Type 2 diabetes mellitus with diabetic polyneuropathy, without long-term current use of insulin    Essential hypertension, onset before 1987    Class 3 severe obesity due to excess calories with serious comorbidity and body mass index (BMI) of 40.0 to 44.9 in adult    Literacy level of illiterate    Sedentary lifestyle, onset 2018    Chronic gout of multiple sites    Anemia    Hypoalbuminemia    Cardiovascular event risk, ASCVD 10-year risk 8.7%, 2021    Family history of premature CAD    Excessive daytime sleepiness    Family history of sleep apnea    Chronic fatigue    Gastroesophageal reflux disease    Depression    Chronic cough    COVID-19,  "6/2021, no residual problem    Elevated ALT measurement    Fatty liver    Hiatal hernia    Hemorrhoids    Nausea    Abdominal pain    JORDAN on CPAP, 100% use    Obese abdomen     Nick has a current medication list which includes the following prescription(s): albuterol, dapagliflozin propanediol, nitroglycerin, pantoprazole, sucralfate, unifine pentips, allopurinol, atorvastatin, blood sugar diagnostic, blood-glucose meter, bupropion, clonidine, dicyclomine, gabapentin, lancets, liraglutide 0.6 mg/0.1 ml (18 mg/3 ml) subq pnij, metoprolol succinate, miconazole nitrate 2 %, mupirocin, and ondansetron.        Health Maintenance Due   Topic Date Due    Shingles Vaccine (2 of 2) 08/04/2022    Diabetic Eye Exam  09/14/2024    Diabetes Urine Screening  01/25/2025    Foot Exam  01/25/2025      Health Maintenance reviewed   Objective:      Vitals:    01/27/25 0946 01/27/25 1022   BP: (!) 156/92 132/88   Pulse: 73    Resp: 18    SpO2: 97%    Weight: 135 kg (297 lb 9.6 oz)    Height: 5' 11" (1.803 m)    PainSc:   6    PainLoc: Leg      Body mass index is 41.51 kg/m².  Physical Exam  Physical Exam    Constitutional: No acute distress. Well-appearing.  Cardiovascular: Regular rate. Regular rhythm. No murmurs.  Pulmonary: Normal respiratory effort. No wheezing. Normal breath sounds.  Skin: Warm. Dry. No rash.  Neurological: Alert.  Psychiatric: Normal mood. Normal behavior.         Assessment:       Assessment & Plan    1. Assessed chronic conditions: shoulder, leg, and ankle pain related to weather changes  2. Reviewed medication needs, including Lexapro refill  3. Evaluated blood pressure, noting initial elevation likely due to missed dose  4. Considered mammogram need  5. Determined A1C check can be deferred for 3 months due to controlled diabetes  6. Postponed urine test for kidney protein to next visit           Plan:       1. Idiopathic chronic gout of multiple sites without tophus  -     allopurinoL (ZYLOPRIM) 100 MG " tablet; Take 1 tablet (100 mg total) by mouth once daily.  Dispense: 90 tablet; Refill: 3    2. Type 2 diabetes mellitus without complication, without long-term current use of insulin  -     atorvastatin (LIPITOR) 10 MG tablet; Take 1 tablet (10 mg total) by mouth once daily.  Dispense: 90 tablet; Refill: 3  -     liraglutide 0.6 mg/0.1 mL, 18 mg/3 mL, subq PNIJ (VICTOZA 2-JORDON) 0.6 mg/0.1 mL (18 mg/3 mL) PnIj pen; Inject 1.8 mg into the skin once daily.  Dispense: 27 mL; Refill: 3    3. Cardiovascular event risk, ASCVD 10-year risk 8.7%, 2021  -     atorvastatin (LIPITOR) 10 MG tablet; Take 1 tablet (10 mg total) by mouth once daily.  Dispense: 90 tablet; Refill: 3    4. Family history of premature CAD  -     atorvastatin (LIPITOR) 10 MG tablet; Take 1 tablet (10 mg total) by mouth once daily.  Dispense: 90 tablet; Refill: 3    5. Depression, unspecified depression type  -     buPROPion (WELLBUTRIN XL) 150 MG TB24 tablet; Take 1 tablet (150 mg total) by mouth once daily.  Dispense: 90 tablet; Refill: 3    6. Essential hypertension, onset before 1987  -     cloNIDine (CATAPRES) 0.2 MG tablet; Take 1 tablet (0.2 mg total) by mouth once daily.  Dispense: 90 tablet; Refill: 3  -     metoprolol succinate (TOPROL-XL) 50 MG 24 hr tablet; Take 1 tablet (50 mg total) by mouth once daily.  Dispense: 90 tablet; Refill: 3    7. Type 2 diabetes mellitus with diabetic polyneuropathy, without long-term current use of insulin  -     gabapentin (NEURONTIN) 600 MG tablet; Take 1 tablet (600 mg total) by mouth nightly as needed (foot pain).  Dispense: 30 tablet; Refill: 3  -     Microalbumin/Creatinine Ratio, Urine; Future; Expected date: 01/27/2025    8. Nausea  -     ondansetron (ZOFRAN-ODT) 4 MG TbDL; Take 1 tablet (4 mg total) by mouth every 8 (eight) hours as needed (nausea).  Dispense: 30 tablet; Refill: 1    Other orders  -     dicyclomine (BENTYL) 10 MG capsule; TAKE ONE CAPSULE BY MOUTH FOUR TIMES DAILY **THANK YOU**   Dispense: 120 capsule; Refill: 1         This note was generated with the assistance of ambient listening technology. Verbal consent was obtained by the patient and accompanying visitor(s) for the recording of patient appointment to facilitate this note. I attest to having reviewed and edited the generated note for accuracy, though some syntax or spelling errors may persist. Please contact the author of this note for any clarification.

## 2025-02-03 ENCOUNTER — HOSPITAL ENCOUNTER (OUTPATIENT)
Dept: RADIOLOGY | Facility: HOSPITAL | Age: 55
Discharge: HOME | End: 2025-02-03
Attending: FAMILY MEDICINE
Payer: MEDICAID

## 2025-02-03 DIAGNOSIS — K76.0 FATTY LIVER: ICD-10-CM

## 2025-02-03 PROCEDURE — 76705 ECHO EXAM OF ABDOMEN: CPT | Mod: 26,,, | Performed by: RADIOLOGY

## 2025-02-03 PROCEDURE — 76705 ECHO EXAM OF ABDOMEN: CPT | Mod: TC

## 2025-02-27 DIAGNOSIS — N52.9 ERECTILE DYSFUNCTION, UNSPECIFIED ERECTILE DYSFUNCTION TYPE: Primary | ICD-10-CM

## 2025-02-27 DIAGNOSIS — E11.42 TYPE 2 DIABETES MELLITUS WITH DIABETIC POLYNEUROPATHY, WITHOUT LONG-TERM CURRENT USE OF INSULIN: ICD-10-CM

## 2025-02-27 RX ORDER — SILDENAFIL 50 MG/1
50 TABLET, FILM COATED ORAL DAILY PRN
Qty: 6 TABLET | Refills: 2 | Status: SHIPPED | OUTPATIENT
Start: 2025-02-27 | End: 2026-02-27

## 2025-02-27 RX ORDER — PEN NEEDLE, DIABETIC 30 GX3/16"
NEEDLE, DISPOSABLE MISCELLANEOUS
Qty: 30 EACH | Refills: 11 | Status: SHIPPED | OUTPATIENT
Start: 2025-02-27

## 2025-02-27 NOTE — PROGRESS NOTES
The patient desires Viagra to treat his erectile dysfunction. History and physical exam has not disclosed any obvious treatable cause of this complaint. He is informed that Viagra is sometimes not covered by insurance. It is available on a fee-for-service cost basis, and is relatively expensive. He can start with 50 mg dose, and increase to 100 mg if necessary. The method of use 1 hour prior to anticipated intercourse is explained. He should not use any more than one tablet in a 24 hour period. The side effects of possible headache, flushing, dyspepsia and transient changes in vision have been explained.     The patient is not taking nitrates, and denies he has access to nitrates in any form at any time. I have counseled him that taking Viagra with nitrates of any form can cause death. Additionally, Viagra serum concentrations can be increased by the following: cimetidine, erythromycin, itraconazole or ketoconazole. This patient does not take these drugs, but I have counseled him to avoid Viagra if he does take any of these.    We have also discussed the fact that there have been some deaths in patients after taking Viagra, felt due to the exertion of intercourse rather than the drug itself. The patient is aware of this, and accepts whatever unknown degree of risk there is in this aspect.

## 2025-04-28 ENCOUNTER — OFFICE VISIT (OUTPATIENT)
Dept: FAMILY MEDICINE | Facility: CLINIC | Age: 55
End: 2025-04-28
Payer: MEDICAID

## 2025-04-28 ENCOUNTER — LAB VISIT (OUTPATIENT)
Dept: LAB | Facility: HOSPITAL | Age: 55
End: 2025-04-28
Attending: FAMILY MEDICINE
Payer: MEDICAID

## 2025-04-28 VITALS
HEART RATE: 82 BPM | OXYGEN SATURATION: 95 % | BODY MASS INDEX: 41.37 KG/M2 | DIASTOLIC BLOOD PRESSURE: 76 MMHG | RESPIRATION RATE: 18 BRPM | HEIGHT: 71 IN | WEIGHT: 295.5 LBS | SYSTOLIC BLOOD PRESSURE: 126 MMHG

## 2025-04-28 DIAGNOSIS — E11.42 TYPE 2 DIABETES MELLITUS WITH DIABETIC POLYNEUROPATHY, WITHOUT LONG-TERM CURRENT USE OF INSULIN: ICD-10-CM

## 2025-04-28 DIAGNOSIS — N52.9 ERECTILE DYSFUNCTION, UNSPECIFIED ERECTILE DYSFUNCTION TYPE: ICD-10-CM

## 2025-04-28 DIAGNOSIS — R05.3 CHRONIC COUGH: ICD-10-CM

## 2025-04-28 LAB
ALBUMIN/CREAT UR: 8.2 UG/MG
CREAT UR-MCNC: 97 MG/DL (ref 23–375)
EAG (OHS): 148 MG/DL (ref 68–131)
HBA1C MFR BLD: 6.8 % (ref 4–5.6)
MICROALBUMIN UR-MCNC: 8 UG/ML (ref ?–5000)

## 2025-04-28 PROCEDURE — 99214 OFFICE O/P EST MOD 30 MIN: CPT | Mod: S$PBB,,, | Performed by: FAMILY MEDICINE

## 2025-04-28 PROCEDURE — 36415 COLL VENOUS BLD VENIPUNCTURE: CPT

## 2025-04-28 PROCEDURE — 3008F BODY MASS INDEX DOCD: CPT | Mod: CPTII,,, | Performed by: FAMILY MEDICINE

## 2025-04-28 PROCEDURE — 3078F DIAST BP <80 MM HG: CPT | Mod: CPTII,,, | Performed by: FAMILY MEDICINE

## 2025-04-28 PROCEDURE — 82043 UR ALBUMIN QUANTITATIVE: CPT

## 2025-04-28 PROCEDURE — 1159F MED LIST DOCD IN RCRD: CPT | Mod: CPTII,,, | Performed by: FAMILY MEDICINE

## 2025-04-28 PROCEDURE — 99214 OFFICE O/P EST MOD 30 MIN: CPT | Mod: PBBFAC,PN | Performed by: FAMILY MEDICINE

## 2025-04-28 PROCEDURE — 1160F RVW MEDS BY RX/DR IN RCRD: CPT | Mod: CPTII,,, | Performed by: FAMILY MEDICINE

## 2025-04-28 PROCEDURE — 83036 HEMOGLOBIN GLYCOSYLATED A1C: CPT

## 2025-04-28 PROCEDURE — 3074F SYST BP LT 130 MM HG: CPT | Mod: CPTII,,, | Performed by: FAMILY MEDICINE

## 2025-04-28 PROCEDURE — 99999 PR PBB SHADOW E&M-EST. PATIENT-LVL IV: CPT | Mod: PBBFAC,,, | Performed by: FAMILY MEDICINE

## 2025-04-28 RX ORDER — DAPAGLIFLOZIN 5 MG/1
5 TABLET, FILM COATED ORAL DAILY
Qty: 30 TABLET | Refills: 11 | Status: SHIPPED | OUTPATIENT
Start: 2025-04-28

## 2025-04-28 RX ORDER — SILDENAFIL 100 MG/1
100 TABLET, FILM COATED ORAL DAILY PRN
Qty: 6 TABLET | Refills: 2 | Status: SHIPPED | OUTPATIENT
Start: 2025-04-28 | End: 2026-04-28

## 2025-04-28 RX ORDER — ALBUTEROL SULFATE 90 UG/1
2 INHALANT RESPIRATORY (INHALATION) EVERY 6 HOURS PRN
Qty: 18 G | Refills: 0 | Status: SHIPPED | OUTPATIENT
Start: 2025-04-28 | End: 2026-04-28

## 2025-04-28 NOTE — PROGRESS NOTES
"  Subjective:       Patient ID: Nick Tam is a 55 y.o. male.    Chief Complaint: Follow-up    History of Present Illness    CHIEF COMPLAINT:  Mr. Tam presents today for follow up    MEDICATIONS:  He reports current 50mg dose is not providing adequate therapeutic effect and requires increase to 100mg.    MUSCULOSKELETAL:  He reports chronic foot pain from a torn ligament 25 years ago that worsens with weather changes. He has a history of gout flares.    VISION:  He reports worsening vision requiring an eye appointment.    IMMUNIZATIONS:  His last tetanus vaccine was in 2020.      ROS:  Eyes: +blurry vision  Musculoskeletal: +limb pain  Skin: +rash, +skin redness  Neurological: +numbness, +decreased sensation in extremities         Problem List[1]  Nick has a current medication list which includes the following prescription(s): allopurinol, atorvastatin, bupropion, clonidine, dicyclomine, gabapentin, liraglutide 0.6 mg/0.1 ml (18 mg/3 ml) subq pnij, metoprolol succinate, ondansetron, pantoprazole, pen needle, diabetic, sucralfate, unifine pentips, albuterol, dapagliflozin propanediol, and sildenafil.        Health Maintenance Due   Topic Date Due    Shingles Vaccine (2 of 2) 08/04/2022    Diabetic Eye Exam  09/14/2024    Diabetes Urine Screening  01/25/2025    Hemoglobin A1c  04/11/2025      Health Maintenance reviewed and discussed.   Objective:      Vitals:    04/28/25 0947 04/28/25 1000 04/28/25 1016   BP: (!) 130/98 (!) 138/92 126/76   Pulse: 82     Resp: 18     SpO2: 95%     Weight: 134 kg (295 lb 8 oz)     Height: 5' 11" (1.803 m)     PainSc:   7     PainLoc: Ankle       Body mass index is 41.21 kg/m².  Physical Exam  Vitals and nursing note reviewed.   Constitutional:       General: He is not in acute distress.     Appearance: He is obese. He is not ill-appearing.   Eyes:      Pupils: Pupils are equal, round, and reactive to light.   Neck:      Vascular: No carotid bruit.   Cardiovascular:      Rate " and Rhythm: Normal rate and regular rhythm.      Pulses:           Dorsalis pedis pulses are 2+ on the right side and 2+ on the left side.        Posterior tibial pulses are 2+ on the right side and 2+ on the left side.      Heart sounds: No murmur heard.  Pulmonary:      Effort: Pulmonary effort is normal. No respiratory distress.      Breath sounds: Normal breath sounds. No wheezing.   Abdominal:      General: There is no distension.      Palpations: Abdomen is soft.   Musculoskeletal:      Cervical back: Normal range of motion.      Right foot: Normal range of motion. No deformity.      Left foot: Normal range of motion. No deformity.   Feet:      Right foot:      Protective Sensation: 10 sites tested.  0 sites sensed.      Skin integrity: Skin integrity normal. No erythema or callus.      Toenail Condition: Right toenails are normal.      Left foot:      Protective Sensation: 10 sites tested.  0 sites sensed.      Skin integrity: Skin integrity normal. No erythema or callus.      Toenail Condition: Left toenails are normal.   Lymphadenopathy:      Cervical: No cervical adenopathy.   Skin:     General: Skin is warm and dry.      Capillary Refill: Capillary refill takes less than 2 seconds.      Findings: No rash.   Neurological:      General: No focal deficit present.      Mental Status: He is alert and oriented to person, place, and time.   Psychiatric:         Mood and Affect: Mood normal.         Behavior: Behavior normal.         Thought Content: Thought content normal.         Judgment: Judgment normal.         Assessment:       Assessment & Plan    1. Assessed foot health due to reported pain, history of torn ligament, and evaluated for potential foreign object in foot based on report of stepping on something.  2. Noted diabetic rash around sock line, likely due to compression.  3. Determined tetanus vaccine due based on last administration in 2020.  4. Performed visual foot exam.  5. Performed monofilament  test on both feet to assess diabetic neuropathy.           Plan:       1. Chronic cough  -     albuterol (VENTOLIN HFA) 90 mcg/actuation inhaler; Inhale 2 puffs into the lungs every 6 (six) hours as needed for Wheezing. Rescue  Dispense: 18 g; Refill: 0    2. Type 2 diabetes mellitus with diabetic polyneuropathy, without long-term current use of insulin  -     Hemoglobin A1C; Future; Expected date: 04/28/2025  -     Microalbumin/Creatinine Ratio, Urine; Future; Expected date: 04/28/2025  -     dapagliflozin propanediol (FARXIGA) 5 mg Tab tablet; Take 1 tablet (5 mg total) by mouth once daily.  Dispense: 30 tablet; Refill: 11    3. Erectile dysfunction, unspecified erectile dysfunction type  -     sildenafiL (VIAGRA) 100 MG tablet; Take 1 tablet (100 mg total) by mouth daily as needed for Erectile Dysfunction.  Dispense: 6 tablet; Refill: 2         This note was generated with the assistance of ambient listening technology. Verbal consent was obtained by the patient and accompanying visitor(s) for the recording of patient appointment to facilitate this note. I attest to having reviewed and edited the generated note for accuracy, though some syntax or spelling errors may persist. Please contact the author of this note for any clarification.         [1]   Patient Active Problem List  Diagnosis    Chest pain, onset 9/2020    Belching, chronic    Symptoms of gastroesophageal reflux    Type 2 diabetes mellitus with diabetic polyneuropathy, without long-term current use of insulin    Essential hypertension, onset before 1987    Class 3 severe obesity due to excess calories with serious comorbidity and body mass index (BMI) of 40.0 to 44.9 in adult    Literacy level of illiterate    Sedentary lifestyle, onset 2018    Chronic gout of multiple sites    Anemia    Hypoalbuminemia    Cardiovascular event risk, ASCVD 10-year risk 8.7%, 2021    Family history of premature CAD    Excessive daytime sleepiness    Family history of  sleep apnea    Chronic fatigue    Gastroesophageal reflux disease    Depression    Chronic cough    COVID-19, 6/2021, no residual problem    Elevated ALT measurement    Fatty liver    Hiatal hernia    Hemorrhoids    Nausea    Abdominal pain    JORDAN on CPAP, 100% use    Obese abdomen

## 2025-05-15 ENCOUNTER — RESULTS FOLLOW-UP (OUTPATIENT)
Dept: FAMILY MEDICINE | Facility: CLINIC | Age: 55
End: 2025-05-15

## 2025-06-02 ENCOUNTER — HOSPITAL ENCOUNTER (EMERGENCY)
Facility: HOSPITAL | Age: 55
Discharge: HOME | End: 2025-06-02
Attending: STUDENT IN AN ORGANIZED HEALTH CARE EDUCATION/TRAINING PROGRAM
Payer: MEDICAID

## 2025-06-02 VITALS
SYSTOLIC BLOOD PRESSURE: 157 MMHG | BODY MASS INDEX: 40.6 KG/M2 | TEMPERATURE: 98 F | HEART RATE: 93 BPM | DIASTOLIC BLOOD PRESSURE: 102 MMHG | RESPIRATION RATE: 17 BRPM | WEIGHT: 290 LBS | HEIGHT: 71 IN | OXYGEN SATURATION: 96 %

## 2025-06-02 DIAGNOSIS — S39.012A STRAIN OF LUMBAR REGION, INITIAL ENCOUNTER: Primary | ICD-10-CM

## 2025-06-02 PROCEDURE — 99284 EMERGENCY DEPT VISIT MOD MDM: CPT | Mod: 25

## 2025-06-02 PROCEDURE — 63600175 PHARM REV CODE 636 W HCPCS: Mod: UD | Performed by: STUDENT IN AN ORGANIZED HEALTH CARE EDUCATION/TRAINING PROGRAM

## 2025-06-02 PROCEDURE — 96372 THER/PROPH/DIAG INJ SC/IM: CPT | Performed by: STUDENT IN AN ORGANIZED HEALTH CARE EDUCATION/TRAINING PROGRAM

## 2025-06-02 RX ORDER — TRAMADOL HYDROCHLORIDE 50 MG/1
50 TABLET, FILM COATED ORAL EVERY 6 HOURS PRN
Qty: 20 TABLET | Refills: 0 | Status: SHIPPED | OUTPATIENT
Start: 2025-06-02

## 2025-06-02 RX ORDER — ORPHENADRINE CITRATE 30 MG/ML
60 INJECTION INTRAMUSCULAR; INTRAVENOUS
Status: COMPLETED | OUTPATIENT
Start: 2025-06-02 | End: 2025-06-02

## 2025-06-02 RX ORDER — METHOCARBAMOL 500 MG/1
1000 TABLET, FILM COATED ORAL 3 TIMES DAILY
Qty: 30 TABLET | Refills: 0 | Status: SHIPPED | OUTPATIENT
Start: 2025-06-02 | End: 2025-06-02

## 2025-06-02 RX ORDER — METHOCARBAMOL 500 MG/1
1000 TABLET, FILM COATED ORAL 3 TIMES DAILY
Qty: 30 TABLET | Refills: 0 | Status: SHIPPED | OUTPATIENT
Start: 2025-06-02 | End: 2025-06-07

## 2025-06-02 RX ORDER — KETOROLAC TROMETHAMINE 30 MG/ML
30 INJECTION, SOLUTION INTRAMUSCULAR; INTRAVENOUS
Status: COMPLETED | OUTPATIENT
Start: 2025-06-02 | End: 2025-06-02

## 2025-06-02 RX ADMIN — ORPHENADRINE CITRATE 60 MG: 60 INJECTION INTRAMUSCULAR; INTRAVENOUS at 10:06

## 2025-06-02 RX ADMIN — KETOROLAC TROMETHAMINE 30 MG: 30 INJECTION, SOLUTION INTRAMUSCULAR; INTRAVENOUS at 10:06

## 2025-06-17 ENCOUNTER — HOSPITAL ENCOUNTER (OUTPATIENT)
Facility: HOSPITAL | Age: 55
Discharge: HOME | End: 2025-06-19
Attending: STUDENT IN AN ORGANIZED HEALTH CARE EDUCATION/TRAINING PROGRAM | Admitting: STUDENT IN AN ORGANIZED HEALTH CARE EDUCATION/TRAINING PROGRAM
Payer: MEDICAID

## 2025-06-17 DIAGNOSIS — R07.9 CHEST PAIN: ICD-10-CM

## 2025-06-17 DIAGNOSIS — R52 INTRACTABLE PAIN: ICD-10-CM

## 2025-06-17 DIAGNOSIS — R07.81 RIB PAIN ON RIGHT SIDE: ICD-10-CM

## 2025-06-17 DIAGNOSIS — R79.89 ELEVATED D-DIMER: ICD-10-CM

## 2025-06-17 DIAGNOSIS — J22 LOWER RESPIRATORY INFECTION: ICD-10-CM

## 2025-06-17 DIAGNOSIS — J96.01 ACUTE HYPOXEMIC RESPIRATORY FAILURE: Primary | ICD-10-CM

## 2025-06-17 LAB
ABSOLUTE EOSINOPHIL (SMH): 0.01 K/UL
ABSOLUTE MONOCYTE (SMH): 1.41 K/UL (ref 0.3–1)
ABSOLUTE NEUTROPHIL COUNT (SMH): 10.7 K/UL (ref 1.8–7.7)
ALBUMIN SERPL-MCNC: 3.2 G/DL (ref 3.5–5.2)
ALLENS TEST: NORMAL
ALP SERPL-CCNC: 131 UNIT/L (ref 40–150)
ALT SERPL-CCNC: 25 UNIT/L (ref 10–44)
ANION GAP (SMH): 14 MMOL/L (ref 8–16)
AST SERPL-CCNC: 36 UNIT/L (ref 11–45)
BASOPHILS # BLD AUTO: 0.06 K/UL
BASOPHILS NFR BLD AUTO: 0.4 %
BILIRUB SERPL-MCNC: 1 MG/DL (ref 0.1–1)
BILIRUB UR QL STRIP.AUTO: NEGATIVE
BUN SERPL-MCNC: 14 MG/DL (ref 6–20)
CALCIUM SERPL-MCNC: 9.7 MG/DL (ref 8.7–10.5)
CHLORIDE SERPL-SCNC: 101 MMOL/L (ref 95–110)
CLARITY UR: CLEAR
CO2 SERPL-SCNC: 20 MMOL/L (ref 23–29)
COLOR UR AUTO: YELLOW
CREAT SERPL-MCNC: 0.9 MG/DL (ref 0.5–1.4)
D DIMER PPP IA.FEU-MCNC: 2.05 MG/L FEU
DELSYS: NORMAL
ERYTHROCYTE [DISTWIDTH] IN BLOOD BY AUTOMATED COUNT: 12 % (ref 11.5–14.5)
ERYTHROCYTE [SEDIMENTATION RATE] IN BLOOD BY WESTERGREN METHOD: 22 MM/H
FIO2: 28
FLOW: 2
GFR SERPLBLD CREATININE-BSD FMLA CKD-EPI: >60 ML/MIN/1.73/M2
GLUCOSE SERPL-MCNC: 150 MG/DL (ref 70–110)
GLUCOSE UR QL STRIP: ABNORMAL
HCT VFR BLD AUTO: 46.5 % (ref 40–54)
HCV AB SERPL QL IA: NORMAL
HGB BLD-MCNC: 15.3 GM/DL (ref 14–18)
HGB UR QL STRIP: ABNORMAL
HIV 1+2 AB+HIV1 P24 AG SERPL QL IA: NORMAL
HOLD SPECIMEN: NORMAL
HOLD SPECIMEN: NORMAL
IMM GRANULOCYTES # BLD AUTO: 0.07 K/UL (ref 0–0.04)
IMM GRANULOCYTES NFR BLD AUTO: 0.5 % (ref 0–0.5)
INFLUENZA A MOLECULAR (OHS): NEGATIVE
INFLUENZA B MOLECULAR (OHS): NEGATIVE
KETONES UR QL STRIP: NEGATIVE
LACTATE SERPL-SCNC: 1.5 MMOL/L (ref 0.5–2.2)
LDH SERPL L TO P-CCNC: 1.86 MMOL/L (ref 0.5–2.2)
LEUKOCYTE ESTERASE UR QL STRIP: NEGATIVE
LYMPHOCYTES # BLD AUTO: 1.39 K/UL (ref 1–4.8)
MCH RBC QN AUTO: 30.1 PG (ref 27–31)
MCHC RBC AUTO-ENTMCNC: 32.9 G/DL (ref 32–36)
MCV RBC AUTO: 92 FL (ref 82–98)
MICROSCOPIC COMMENT: NORMAL
MODE: NORMAL
NITRITE UR QL STRIP: NEGATIVE
NUCLEATED RBC (/100WBC) (SMH): 0 /100 WBC
OHS QRS DURATION: 124 MS
OHS QTC CALCULATION: 443 MS
PH UR STRIP: 6 [PH]
PLATELET # BLD AUTO: 314 K/UL (ref 150–450)
PMV BLD AUTO: 8.8 FL (ref 9.2–12.9)
POCT GLUCOSE: 166 MG/DL (ref 70–110)
POCT GLUCOSE: 176 MG/DL (ref 70–110)
POTASSIUM SERPL-SCNC: 3.5 MMOL/L (ref 3.5–5.1)
PROCALCITONIN SERPL-MCNC: 0.18 NG/ML
PROT SERPL-MCNC: 8.8 GM/DL (ref 6–8.4)
PROT UR QL STRIP: ABNORMAL
RBC # BLD AUTO: 5.08 M/UL (ref 4.6–6.2)
RBC #/AREA URNS AUTO: 4 /HPF
RELATIVE EOSINOPHIL (SMH): 0.1 % (ref 0–8)
RELATIVE LYMPHOCYTE (SMH): 10.2 % (ref 18–48)
RELATIVE MONOCYTE (SMH): 10.3 % (ref 4–15)
RELATIVE NEUTROPHIL (SMH): 78.5 % (ref 38–73)
SAMPLE: NORMAL
SARS-COV-2 RDRP RESP QL NAA+PROBE: NEGATIVE
SITE: NORMAL
SODIUM SERPL-SCNC: 135 MMOL/L (ref 136–145)
SP GR UR STRIP: >=1.03
SP02: 94
SQUAMOUS #/AREA URNS AUTO: 2 /HPF
TROPONIN I SERPL HS-MCNC: <3 NG/L
UROBILINOGEN UR STRIP-ACNC: NEGATIVE EU/DL
WBC # BLD AUTO: 13.63 K/UL (ref 3.9–12.7)
WBC #/AREA URNS AUTO: 4 /HPF

## 2025-06-17 PROCEDURE — 63600175 PHARM REV CODE 636 W HCPCS: Mod: UD

## 2025-06-17 PROCEDURE — 96372 THER/PROPH/DIAG INJ SC/IM: CPT

## 2025-06-17 PROCEDURE — 93010 ELECTROCARDIOGRAM REPORT: CPT | Mod: ,,, | Performed by: INTERNAL MEDICINE

## 2025-06-17 PROCEDURE — 94640 AIRWAY INHALATION TREATMENT: CPT

## 2025-06-17 PROCEDURE — 87389 HIV-1 AG W/HIV-1&-2 AB AG IA: CPT | Performed by: EMERGENCY MEDICINE

## 2025-06-17 PROCEDURE — 99900035 HC TECH TIME PER 15 MIN (STAT)

## 2025-06-17 PROCEDURE — 87502 INFLUENZA DNA AMP PROBE: CPT | Performed by: NURSE PRACTITIONER

## 2025-06-17 PROCEDURE — 36415 COLL VENOUS BLD VENIPUNCTURE: CPT | Performed by: STUDENT IN AN ORGANIZED HEALTH CARE EDUCATION/TRAINING PROGRAM

## 2025-06-17 PROCEDURE — 87040 BLOOD CULTURE FOR BACTERIA: CPT | Performed by: STUDENT IN AN ORGANIZED HEALTH CARE EDUCATION/TRAINING PROGRAM

## 2025-06-17 PROCEDURE — 63600175 PHARM REV CODE 636 W HCPCS: Mod: UD | Performed by: STUDENT IN AN ORGANIZED HEALTH CARE EDUCATION/TRAINING PROGRAM

## 2025-06-17 PROCEDURE — 93010 ELECTROCARDIOGRAM REPORT: CPT | Mod: ,,, | Performed by: GENERAL PRACTICE

## 2025-06-17 PROCEDURE — 85379 FIBRIN DEGRADATION QUANT: CPT | Performed by: STUDENT IN AN ORGANIZED HEALTH CARE EDUCATION/TRAINING PROGRAM

## 2025-06-17 PROCEDURE — G0378 HOSPITAL OBSERVATION PER HR: HCPCS

## 2025-06-17 PROCEDURE — 94761 N-INVAS EAR/PLS OXIMETRY MLT: CPT

## 2025-06-17 PROCEDURE — 25000003 PHARM REV CODE 250

## 2025-06-17 PROCEDURE — 84484 ASSAY OF TROPONIN QUANT: CPT | Performed by: NURSE PRACTITIONER

## 2025-06-17 PROCEDURE — 93005 ELECTROCARDIOGRAM TRACING: CPT

## 2025-06-17 PROCEDURE — 83605 ASSAY OF LACTIC ACID: CPT | Mod: 59 | Performed by: STUDENT IN AN ORGANIZED HEALTH CARE EDUCATION/TRAINING PROGRAM

## 2025-06-17 PROCEDURE — 25000242 PHARM REV CODE 250 ALT 637 W/ HCPCS

## 2025-06-17 PROCEDURE — 84484 ASSAY OF TROPONIN QUANT: CPT | Mod: 91

## 2025-06-17 PROCEDURE — 84145 PROCALCITONIN (PCT): CPT | Performed by: STUDENT IN AN ORGANIZED HEALTH CARE EDUCATION/TRAINING PROGRAM

## 2025-06-17 PROCEDURE — 81003 URINALYSIS AUTO W/O SCOPE: CPT | Performed by: NURSE PRACTITIONER

## 2025-06-17 PROCEDURE — 85025 COMPLETE CBC W/AUTO DIFF WBC: CPT | Performed by: NURSE PRACTITIONER

## 2025-06-17 PROCEDURE — 80053 COMPREHEN METABOLIC PANEL: CPT | Performed by: NURSE PRACTITIONER

## 2025-06-17 PROCEDURE — 96375 TX/PRO/DX INJ NEW DRUG ADDON: CPT

## 2025-06-17 PROCEDURE — 96376 TX/PRO/DX INJ SAME DRUG ADON: CPT

## 2025-06-17 PROCEDURE — 25500020 PHARM REV CODE 255

## 2025-06-17 PROCEDURE — 25000003 PHARM REV CODE 250: Mod: UD | Performed by: STUDENT IN AN ORGANIZED HEALTH CARE EDUCATION/TRAINING PROGRAM

## 2025-06-17 PROCEDURE — 94799 UNLISTED PULMONARY SVC/PX: CPT

## 2025-06-17 PROCEDURE — 36415 COLL VENOUS BLD VENIPUNCTURE: CPT | Performed by: NURSE PRACTITIONER

## 2025-06-17 PROCEDURE — 86803 HEPATITIS C AB TEST: CPT | Performed by: EMERGENCY MEDICINE

## 2025-06-17 PROCEDURE — 96367 TX/PROPH/DG ADDL SEQ IV INF: CPT

## 2025-06-17 PROCEDURE — 27000221 HC OXYGEN, UP TO 24 HOURS

## 2025-06-17 PROCEDURE — 99285 EMERGENCY DEPT VISIT HI MDM: CPT | Mod: 25

## 2025-06-17 PROCEDURE — 83605 ASSAY OF LACTIC ACID: CPT

## 2025-06-17 PROCEDURE — 96365 THER/PROPH/DIAG IV INF INIT: CPT

## 2025-06-17 PROCEDURE — U0002 COVID-19 LAB TEST NON-CDC: HCPCS | Performed by: NURSE PRACTITIONER

## 2025-06-17 PROCEDURE — 36415 COLL VENOUS BLD VENIPUNCTURE: CPT | Performed by: EMERGENCY MEDICINE

## 2025-06-17 RX ORDER — METHOCARBAMOL 500 MG/1
1000 TABLET, FILM COATED ORAL 3 TIMES DAILY PRN
Status: DISCONTINUED | OUTPATIENT
Start: 2025-06-17 | End: 2025-06-19 | Stop reason: HOSPADM

## 2025-06-17 RX ORDER — FENTANYL CITRATE 50 UG/ML
50 INJECTION, SOLUTION INTRAMUSCULAR; INTRAVENOUS
Refills: 0 | Status: COMPLETED | OUTPATIENT
Start: 2025-06-17 | End: 2025-06-17

## 2025-06-17 RX ORDER — IPRATROPIUM BROMIDE AND ALBUTEROL SULFATE 2.5; .5 MG/3ML; MG/3ML
3 SOLUTION RESPIRATORY (INHALATION)
Status: DISCONTINUED | OUTPATIENT
Start: 2025-06-17 | End: 2025-06-18

## 2025-06-17 RX ORDER — ALUMINUM HYDROXIDE, MAGNESIUM HYDROXIDE, AND SIMETHICONE 1200; 120; 1200 MG/30ML; MG/30ML; MG/30ML
30 SUSPENSION ORAL 4 TIMES DAILY PRN
Status: DISCONTINUED | OUTPATIENT
Start: 2025-06-17 | End: 2025-06-19 | Stop reason: HOSPADM

## 2025-06-17 RX ORDER — ACETAMINOPHEN 325 MG/1
650 TABLET ORAL EVERY 4 HOURS PRN
Status: DISCONTINUED | OUTPATIENT
Start: 2025-06-17 | End: 2025-06-19 | Stop reason: HOSPADM

## 2025-06-17 RX ORDER — PROCHLORPERAZINE EDISYLATE 5 MG/ML
5 INJECTION INTRAMUSCULAR; INTRAVENOUS EVERY 6 HOURS PRN
Status: DISCONTINUED | OUTPATIENT
Start: 2025-06-17 | End: 2025-06-19 | Stop reason: HOSPADM

## 2025-06-17 RX ORDER — METHOCARBAMOL 500 MG/1
500 TABLET, FILM COATED ORAL
Status: COMPLETED | OUTPATIENT
Start: 2025-06-17 | End: 2025-06-17

## 2025-06-17 RX ORDER — HYDROMORPHONE HYDROCHLORIDE 1 MG/ML
0.5 INJECTION, SOLUTION INTRAMUSCULAR; INTRAVENOUS; SUBCUTANEOUS EVERY 4 HOURS PRN
Status: DISCONTINUED | OUTPATIENT
Start: 2025-06-17 | End: 2025-06-19 | Stop reason: HOSPADM

## 2025-06-17 RX ORDER — LIDOCAINE 50 MG/G
1 PATCH TOPICAL
Status: DISCONTINUED | OUTPATIENT
Start: 2025-06-17 | End: 2025-06-19 | Stop reason: HOSPADM

## 2025-06-17 RX ORDER — OXYCODONE HYDROCHLORIDE 5 MG/1
5 TABLET ORAL EVERY 6 HOURS PRN
Refills: 0 | Status: DISCONTINUED | OUTPATIENT
Start: 2025-06-17 | End: 2025-06-19 | Stop reason: HOSPADM

## 2025-06-17 RX ORDER — GLUCAGON 1 MG
1 KIT INJECTION
Status: DISCONTINUED | OUTPATIENT
Start: 2025-06-17 | End: 2025-06-19 | Stop reason: HOSPADM

## 2025-06-17 RX ORDER — LEVOFLOXACIN 5 MG/ML
750 INJECTION, SOLUTION INTRAVENOUS
Status: DISCONTINUED | OUTPATIENT
Start: 2025-06-17 | End: 2025-06-19 | Stop reason: HOSPADM

## 2025-06-17 RX ORDER — ENOXAPARIN SODIUM 100 MG/ML
40 INJECTION SUBCUTANEOUS EVERY 12 HOURS
Status: DISCONTINUED | OUTPATIENT
Start: 2025-06-17 | End: 2025-06-19

## 2025-06-17 RX ORDER — ONDANSETRON HYDROCHLORIDE 2 MG/ML
4 INJECTION, SOLUTION INTRAVENOUS
Status: COMPLETED | OUTPATIENT
Start: 2025-06-17 | End: 2025-06-17

## 2025-06-17 RX ORDER — ONDANSETRON HYDROCHLORIDE 2 MG/ML
4 INJECTION, SOLUTION INTRAVENOUS EVERY 8 HOURS PRN
Status: DISCONTINUED | OUTPATIENT
Start: 2025-06-17 | End: 2025-06-19 | Stop reason: HOSPADM

## 2025-06-17 RX ORDER — LANOLIN ALCOHOL/MO/W.PET/CERES
800 CREAM (GRAM) TOPICAL
Status: DISCONTINUED | OUTPATIENT
Start: 2025-06-17 | End: 2025-06-19 | Stop reason: HOSPADM

## 2025-06-17 RX ORDER — LIDOCAINE 50 MG/G
1 PATCH TOPICAL
Status: DISCONTINUED | OUTPATIENT
Start: 2025-06-17 | End: 2025-06-17

## 2025-06-17 RX ORDER — INSULIN ASPART 100 [IU]/ML
0-5 INJECTION, SOLUTION INTRAVENOUS; SUBCUTANEOUS
Status: DISCONTINUED | OUTPATIENT
Start: 2025-06-17 | End: 2025-06-19 | Stop reason: HOSPADM

## 2025-06-17 RX ORDER — IBUPROFEN 200 MG
16 TABLET ORAL
Status: DISCONTINUED | OUTPATIENT
Start: 2025-06-17 | End: 2025-06-19 | Stop reason: HOSPADM

## 2025-06-17 RX ORDER — PANTOPRAZOLE SODIUM 40 MG/1
40 TABLET, DELAYED RELEASE ORAL DAILY
Status: DISCONTINUED | OUTPATIENT
Start: 2025-06-17 | End: 2025-06-19 | Stop reason: HOSPADM

## 2025-06-17 RX ORDER — NALOXONE HCL 0.4 MG/ML
0.02 VIAL (ML) INJECTION
Status: DISCONTINUED | OUTPATIENT
Start: 2025-06-17 | End: 2025-06-19 | Stop reason: HOSPADM

## 2025-06-17 RX ORDER — SIMETHICONE 80 MG
1 TABLET,CHEWABLE ORAL 4 TIMES DAILY PRN
Status: DISCONTINUED | OUTPATIENT
Start: 2025-06-17 | End: 2025-06-19 | Stop reason: HOSPADM

## 2025-06-17 RX ORDER — IBUPROFEN 200 MG
24 TABLET ORAL
Status: DISCONTINUED | OUTPATIENT
Start: 2025-06-17 | End: 2025-06-19 | Stop reason: HOSPADM

## 2025-06-17 RX ORDER — DIPHENHYDRAMINE HYDROCHLORIDE 50 MG/ML
25 INJECTION, SOLUTION INTRAMUSCULAR; INTRAVENOUS EVERY 6 HOURS PRN
Status: DISCONTINUED | OUTPATIENT
Start: 2025-06-17 | End: 2025-06-19 | Stop reason: HOSPADM

## 2025-06-17 RX ORDER — SODIUM CHLORIDE 0.9 % (FLUSH) 0.9 %
10 SYRINGE (ML) INJECTION EVERY 12 HOURS PRN
Status: DISCONTINUED | OUTPATIENT
Start: 2025-06-17 | End: 2025-06-19 | Stop reason: HOSPADM

## 2025-06-17 RX ADMIN — IPRATROPIUM BROMIDE AND ALBUTEROL SULFATE 3 ML: 2.5; .5 SOLUTION RESPIRATORY (INHALATION) at 09:06

## 2025-06-17 RX ADMIN — LEVOFLOXACIN 750 MG: 750 INJECTION, SOLUTION INTRAVENOUS at 07:06

## 2025-06-17 RX ADMIN — FENTANYL CITRATE 50 MCG: 50 INJECTION INTRAMUSCULAR; INTRAVENOUS at 06:06

## 2025-06-17 RX ADMIN — FENTANYL CITRATE 50 MCG: 50 INJECTION INTRAMUSCULAR; INTRAVENOUS at 08:06

## 2025-06-17 RX ADMIN — IOHEXOL 75 ML: 350 INJECTION, SOLUTION INTRAVENOUS at 05:06

## 2025-06-17 RX ADMIN — PANTOPRAZOLE SODIUM 40 MG: 40 TABLET, DELAYED RELEASE ORAL at 10:06

## 2025-06-17 RX ADMIN — FENTANYL CITRATE 50 MCG: 50 INJECTION INTRAMUSCULAR; INTRAVENOUS at 04:06

## 2025-06-17 RX ADMIN — ONDANSETRON 4 MG: 2 INJECTION INTRAMUSCULAR; INTRAVENOUS at 04:06

## 2025-06-17 RX ADMIN — LIDOCAINE 1 PATCH: 50 PATCH CUTANEOUS at 04:06

## 2025-06-17 RX ADMIN — METHOCARBAMOL 500 MG: 500 TABLET ORAL at 08:06

## 2025-06-17 RX ADMIN — PROCHLORPERAZINE EDISYLATE 5 MG: 5 INJECTION INTRAMUSCULAR; INTRAVENOUS at 11:06

## 2025-06-17 RX ADMIN — ENOXAPARIN SODIUM 40 MG: 40 INJECTION SUBCUTANEOUS at 10:06

## 2025-06-17 RX ADMIN — OXYCODONE 5 MG: 5 TABLET ORAL at 11:06

## 2025-06-17 RX ADMIN — PROMETHAZINE HYDROCHLORIDE 25 MG: 25 INJECTION INTRAMUSCULAR; INTRAVENOUS at 07:06

## 2025-06-17 NOTE — FIRST PROVIDER EVALUATION
Emergency Department TeleTriage Encounter Note      CHIEF COMPLAINT    Chief Complaint   Patient presents with    Chest Pain     Since last night.  Right chest and rib pain.  Hurts when he coughs    Cough       VITAL SIGNS   Initial Vitals [06/17/25 1534]   BP Pulse Resp Temp SpO2   (!) 176/95 (!) 113 20 98.4 °F (36.9 °C) 95 %      MAP       --            ALLERGIES    Review of patient's allergies indicates:   Allergen Reactions    Shrimp Shortness Of Breath    Codeine     Pcn [penicillins]        PROVIDER TRIAGE NOTE  Verbal consent for the teletriage evaluation was given by the patient at the start of the evaluation.  All efforts will be made to maintain patient's privacy during the evaluation.      This is a teletriage evaluation of a 55 y.o. male presenting to the ED with c/o cough and chest pain, back pain that started last night. Limited physical exam via telehealth: The patient is awake, alert, answering questions appropriately and is not in respiratory distress.  As the Teletriage provider, I performed an initial assessment and ordered appropriate labs and imaging studies, if any, to facilitate the patient's care once placed in the ED. Once a room is available, care and a full evaluation will be completed by an alternate ED provider.  Any additional orders and the final disposition will be determined by that provider.  All imaging and labs will not be followed-up by the Teletriage Team, including myself.          ORDERS  Labs Reviewed   HEPATITIS C ANTIBODY   HEP C VIRUS HOLD SPECIMEN   HIV 1 / 2 ANTIBODY   HIV VIRUS CONFIRMATION HOLD SPECIMEN       ED Orders (720h ago, onward)      Start Ordered     Status Ordering Provider    06/17/25 1541 06/17/25 1540  Cardiac Monitoring - Adult  Continuous        Comments: Notify Physician If:    Ordered NIKOLE JETER    06/17/25 1541 06/17/25 1540  Pulse Oximetry Continuous  Continuous         Ordered NIKOLE JETER    06/17/25 1541 06/17/25 1540  Diet NPO  Diet  "effective now         Ordered NIKOLE JETER RODRIGO    06/17/25 1537 06/17/25 1536  Hepatitis C Antibody  STAT        Placed in "And" Linked Group    Ordered SUDEEPANUP TESS J    06/17/25 1537 06/17/25 1536  HCV Virus Hold Specimen  STAT        Placed in "And" Linked Group    Ordered PIKARIA, TESS J    06/17/25 1537 06/17/25 1536  HIV 1/2 Ag/Ab (4th Gen)  STAT        Placed in "And" Linked Group    Ordered PIKARIA TESS J    06/17/25 1537 06/17/25 1536  HIV Virus Confirmation Hold Specimen  STAT        Placed in "And" Linked Group    Ordered SUDEEPANUP TESS J    06/17/25 1522 06/17/25 1522  EKG 12-lead  Once         Completed by REBEL CHRISTIANSON on 6/17/2025 at  3:35 PM YAW SR JR.    Unscheduled 06/17/25 1540  Vital signs  Every 15 min       Ordered NIKOLE JETER    Unscheduled 06/17/25 1540  Saline lock IV  Once         Ordered NIKOLE JETER    Unscheduled 06/17/25 1540  EKG 12-lead  Once        Comments: Do not perform if previously done during this visit/ triage    Ordered NIKOLE JETER    Unscheduled 06/17/25 1540  CBC auto differential  STAT         Ordered NIKOLE JETER    Unscheduled 06/17/25 1540  Comprehensive metabolic panel  STAT         Ordered NIKOLE JETER    Unscheduled 06/17/25 1540  Troponin I High Sensitivity #1  STAT         Ordered NIKOLE JETER    Unscheduled 06/17/25 1540  Troponin I High Sensitivity #2  Once Timed         Ordered NIKOLE JETER    Unscheduled 06/17/25 1540  X-Ray Chest PA And Lateral  1 time imaging         Ordered NIKOLE JETER    Unscheduled 06/17/25 1540  Urinalysis, Reflex to Urine Culture Urine, Clean Catch  STAT         Ordered NIKOLE JETER    Unscheduled 06/17/25 1540  COVID-19 Rapid Screening  STAT         Ordered NIKOLE JETER    Unscheduled 06/17/25 1540  Influenza A & B by Molecular  Once         Ordered NIKOLE JETER              Virtual Visit Note: The provider triage portion of this emergency department evaluation and documentation was performed via [employer], " a HIPAA-compliant telemedicine application, in concert with a tele-presenter in the room. A face to face patient evaluation with one of my colleagues will occur once the patient is placed in an emergency department room.      DISCLAIMER: This note was prepared with [employer] voice recognition transcription software. Garbled syntax, mangled pronouns, and other bizarre constructions may be attributed to that software system.

## 2025-06-17 NOTE — ED PROVIDER NOTES
Encounter Date: 6/17/2025       History     Chief Complaint   Patient presents with    Chest Pain     Since last night.  Right chest and rib pain.  Hurts when he coughs    Cough     55-year-old male presents for evaluation of right-sided chest pain, associated dyspnea, worsened over the last 24 hours however has been ongoing for couple of weeks.  Collateral history obtained from wife.  Associated nausea with no abdominal pain    The history is provided by the patient, the spouse and medical records.     Review of patient's allergies indicates:   Allergen Reactions    Shrimp Shortness Of Breath    Codeine     Pcn [penicillins]      Past Medical History:   Diagnosis Date    Diabetes mellitus     GERD (gastroesophageal reflux disease)     Gout     Hypertension     Obesity      Past Surgical History:   Procedure Laterality Date    CHOLECYSTECTOMY      COLONOSCOPY N/A 7/28/2021    Procedure: COLONOSCOPY;  Surgeon: Joey Portillo MD;  Location: [facility];  Service: Endoscopy;  Laterality: N/A;    ESOPHAGOGASTRODUODENOSCOPY N/A 7/28/2021    Procedure: EGD (ESOPHAGOGASTRODUODENOSCOPY);  Surgeon: Joey Portillo MD;  Location: [facility];  Service: Endoscopy;  Laterality: N/A;     No family history on file.  Social History[1]  Review of Systems   All other systems reviewed and are negative.      Physical Exam     Initial Vitals [06/17/25 1534]   BP Pulse Resp Temp SpO2   (!) 176/95 (!) 113 20 98.4 °F (36.9 °C) 95 %      MAP       --         Physical Exam    Nursing note and vitals reviewed.  Constitutional: Vital signs are normal. He is not diaphoretic.  Non-toxic appearance.   HENT:   Head: Normocephalic.   Eyes: No scleral icterus.   Pulmonary/Chest: No stridor. He is in respiratory distress.   Bilateral chest rise   Abdominal: There is no guarding.   Musculoskeletal:         General: No tenderness.      Cervical back: No rigidity.     Neurological: He is alert.   Skin: Skin is warm and dry. No rash noted.   Psychiatric: His  speech is normal. He is not actively hallucinating.   Not anxious  or agitated         ED Course   Critical Care    Date/Time: 6/17/2025 3:19 PM    Performed by: Phong Helm Jr., DO  Authorized by: Phong Helm Jr., DO  Direct patient critical care time: 10 minutes  Additional history critical care time: 10 minutes  Ordering / reviewing critical care time: 10 minutes  Documentation critical care time: 10 minutes  Total critical care time (exclusive of procedural time) : 40 minutes        Labs Reviewed   COMPREHENSIVE METABOLIC PANEL - Abnormal       Result Value    Sodium 135 (*)     Potassium 3.5      Chloride 101      CO2 20 (*)     Glucose 150 (*)     BUN 14      Creatinine 0.9      Calcium 9.7      Protein Total 8.8 (*)     Albumin 3.2 (*)     Bilirubin Total 1.0            AST 36      ALT 25      Anion Gap 14      eGFR >60     URINALYSIS, REFLEX TO URINE CULTURE - Abnormal    Color, UA Yellow      Appearance, UA Clear      Spec Grav UA >=1.030 (*)     pH, UA 6.0      Protein, UA 1+ (*)     Glucose, UA 4+ (*)     Ketones, UA Negative      Blood, UA 1+ (*)     Bilirubin, UA Negative      Urobilinogen, UA Negative      Nitrites, UA Negative      Leukocyte Esterase, UA Negative     CBC WITH DIFFERENTIAL - Abnormal    WBC 13.63 (*)     RBC 5.08      Hgb 15.3      Hct 46.5      MCV 92      MCH 30.1      MCHC 32.9      RDW 12.0      Platelet Count 314      MPV 8.8 (*)     Nucleated RBC 0      Neut % 78.5 (*)     Lymph % 10.2 (*)     Mono % 10.3      Eos % 0.1      Basophil % 0.4      Imm Grans % 0.5      Neut # 10.7 (*)     Lymph # 1.39      Mono # 1.41 (*)     Eos # 0.01      Baso # 0.06      Imm Grans # 0.07 (*)    D DIMER, QUANTITATIVE - Abnormal    D-Dimer 2.05 (*)    INFLUENZA A & B BY MOLECULAR - Normal    INFLUENZA A MOLECULAR Negative      INFLUENZA B MOLECULAR  Negative     HEPATITIS C ANTIBODY - Normal    Hep C Ab Interp Non-Reactive     HIV 1 / 2 ANTIBODY - Normal    HIV 1/2 Ag/Ab  Non-Reactive     TROPONIN I HIGH SENSITIVITY - Normal    Troponin High Sensitive <3     SARS-COV-2 RNA AMPLIFICATION, QUAL - Normal    SARS COV-2 Molecular Negative     TROPONIN I HIGH SENSITIVITY - Normal    Troponin High Sensitive <3     CULTURE, BLOOD   CULTURE, BLOOD   HEP C VIRUS HOLD SPECIMEN    Extra Tube Hold for add-ons.     HIV VIRUS CONFIRMATION HOLD SPECIMEN    Extra Tube Hold for add-ons.     CBC W/ AUTO DIFFERENTIAL    Narrative:     The following orders were created for panel order CBC auto differential.  Procedure                               Abnormality         Status                     ---------                               -----------         ------                     CBC with Differential[[id number]]       Abnormal            Final result                 Please view results for these tests on the individual orders.   URINALYSIS MICROSCOPIC    RBC, UA 4      WBC, UA 4      Squamous Epithelial Cells, UA 2      Microscopic Comment       PROCALCITONIN   ISTAT LACTATE    POC Lactate 1.86      Rate 22      Sample VENOUS      Site Other      Allens Test N/A      DelSys Nasal Can      Mode SPONT      Flow 2      FiO2 28      Sp02 94            Imaging Results              CTA Chest Non-Coronary (PE Studies) (Final result)  Result time 06/17/25 18:59:52      Final result by Vasu Vernon MD (06/17/25 18:59:52)                   Impression:      1. No evidence of acute pulmonary thromboembolism.  2. Atelectatic type changes in both lung bases right greater than left.      Electronically signed by: Vasu Vernon  Date:    06/17/2025  Time:    18:59               Narrative:    EXAMINATION:  CTA CHEST NON CORONARY (PE STUDIES)    CLINICAL HISTORY:  Pulmonary embolism (PE) suspected, high prob;    TECHNIQUE:  Following the intravenous administration of 75 cc of Omnipaque 350 contrast material, 1.25 mm contiguous axial images were acquired through the chest utilizing the CT pulmonary angiogram  protocol.  2-D coronal and sagittal reconstructed images of the chest and sagittal oblique MIP images of the pulmonary arterial system were generated from the source data.    COMPARISON:  None.    FINDINGS:  Pulmonary arterial system: This is a good quality examination for the evaluation of pulmonary thromboembolism with good opacification of the pulmonary arteries to the level of the segmental branches of the pulmonary arterial system. There is no evidence of acute pulmonary thromboembolism. No large saddle pulmonary embolism, enlargement of the main pulmonary artery, or secondary findings of right ventricular strain.    Aorta and heart: The heart is normal in size.  No pericardial fluid.  No thoracic aortic aneurysm.  No thoracic aortic dissection.    Airways: Unremarkable.    Lymph nodes: No pathologically enlarged lymph nodes in the chest or axilla.    Lungs: Mild atelectatic type changes in the gravity dependent portions of the lung right greater than left.  The lungs are wise clear with no evidence of airspace consolidation, mass, or bronchiectasis.  No emphysematous lung architecture.    Pleura: No significant volume of pleural fluid or pneumothorax.  No pleural based masses.  Oval fluid loculated in the posterior major fissure on the right.    Visualized upper abdominal soft tissues: No significant abnormalities appreciated.    Bones: There is degenerative change of the thoracic spine.                                       X-Ray Chest PA And Lateral (Final result)  Result time 06/17/25 16:32:25      Final result by Vasu Nagy Jr., MD (06/17/25 16:32:25)                   Impression:      Irregularity of the proximal right clavicle may be due to old fracture and nonunion otherwise negative chest x-ray      Electronically signed by: Vasu Nagy MD  Date:    06/17/2025  Time:    16:32               Narrative:    EXAMINATION:  XR CHEST PA AND LATERAL    CLINICAL HISTORY:  Chest  Pain;    TECHNIQUE:  PA and lateral views of the chest were performed.    COMPARISON:  Chest x-ray of June 11, 2021 and July 26, 2024    FINDINGS:  The mediastinal and cardiac size and contours are normal.  The diaphragms of pleura are clear.  No intrapulmonary mass or infiltrate is seen.  There is irregularity of the proximal right clavicle suggesting a old fracture and possible nonunion.  Similar finding is not seen of the left clavicle.                                       Medications   LIDOcaine 5 % patch 1 patch (1 patch Transdermal Patch Applied 6/17/25 1648)   levoFLOXacin 750 mg/150 mL IVPB 750 mg (750 mg Intravenous New Bag 6/17/25 1957)   fentaNYL 50 mcg/mL injection 50 mcg (has no administration in time range)   methocarbamoL tablet 500 mg (has no administration in time range)   fentaNYL 50 mcg/mL injection 50 mcg (50 mcg Intravenous Given 6/17/25 1647)   ondansetron injection 4 mg (4 mg Intravenous Given 6/17/25 1646)   iohexoL (OMNIPAQUE 350) 350 mg iodine/mL injection (75 mLs Intravenous Given 6/17/25 1759)   fentaNYL 50 mcg/mL injection 50 mcg (50 mcg Intravenous Given 6/17/25 1856)   promethazine (PHENERGAN) 25 mg in 0.9% NaCl 50 mL IVPB (0 mg Intravenous Stopped 6/17/25 1956)     Medical Decision Making  55-year-old male presents for evaluation of right-sided chest pain, associated dyspnea, worsened over the last 24 hours however has been ongoing for couple of weeks.  Collateral history obtained from wife.  Associated nausea with no abdominal pain    Patient reports worsening cough and shortness a breath    Ongoing for roughly 3 weeks    -lowest oxygen 89 and 90% on room air, workup initiated, per my interpretation a CT imaging concern for right lower lobe infiltrate, patient with leukocytosis, hypoxemia and tachypnea, concern for bacterial cause of infection.  Code sepsis initiated and patient administered IV antibiotics and multiple medications for supportive care for right-sided rib pain close  to area of infiltrate.  Discussed case with hospitalist team who will admit for further management evaluation      -D-dimer elevated, no evidence of PE    Amount and/or Complexity of Data Reviewed  Independent Historian: spouse  Labs: ordered.  Radiology: ordered and independent interpretation performed. Decision-making details documented in ED Course.     Details: Concern for right lower lobe infiltrate  ECG/medicine tests: ordered and independent interpretation performed. Decision-making details documented in ED Course.     Details: Repeat EKG at 3:24 p.m., rate 107, sinus tachycardia, , no STEMI  Discussion of management or test interpretation with external provider(s): Spoke with So Elliott NP with hospitalist team will admit for further management and evaluation      Risk  Prescription drug management.  Decision regarding hospitalization.               ED Course as of 06/17/25 2016 Tue Jun 17, 2025   1534 EKG rate 107, sinus tachycardia, , , no STEMI EKG interpreted by myself Phong Helm DO   [KB]   1757 Troponin within normal limits x2 [KB]   1939 Concern for sepsis around 7:40 p.m. [KB]      ED Course User Index  [KB] Phong Helm Jr., DO                           Clinical Impression:  Final diagnoses:  [R07.9] Chest pain  [R79.89] Elevated d-dimer  [J22] Lower respiratory infection  [R52] Intractable pain  [R07.81] Rib pain on right side  [J96.01] Acute hypoxemic respiratory failure (Primary)          ED Disposition Condition    Admit                     Phong Helm Jr., DO  06/17/25 2016         [1]   Social History  Tobacco Use    Smoking status: Never    Smokeless tobacco: Never   Substance Use Topics    Alcohol use: Not Currently    Drug use: Never        Phong Helm Jr., DO  06/17/25 2016

## 2025-06-18 PROBLEM — E66.813 CLASS 3 SEVERE OBESITY DUE TO EXCESS CALORIES WITH SERIOUS COMORBIDITY AND BODY MASS INDEX (BMI) OF 40.0 TO 44.9 IN ADULT: Status: RESOLVED | Noted: 2021-05-28 | Resolved: 2025-06-18

## 2025-06-18 PROBLEM — U07.1 COVID-19: Status: RESOLVED | Noted: 2021-06-11 | Resolved: 2025-06-18

## 2025-06-18 PROBLEM — E66.9 OBESITY (BMI 30-39.9): Status: ACTIVE | Noted: 2025-06-18

## 2025-06-18 PROBLEM — R07.9 CHEST PAIN: Status: RESOLVED | Noted: 2021-05-26 | Resolved: 2025-06-18

## 2025-06-18 PROBLEM — E66.01 CLASS 3 SEVERE OBESITY DUE TO EXCESS CALORIES WITH SERIOUS COMORBIDITY AND BODY MASS INDEX (BMI) OF 40.0 TO 44.9 IN ADULT: Status: RESOLVED | Noted: 2021-05-28 | Resolved: 2025-06-18

## 2025-06-18 PROBLEM — D64.9 ANEMIA: Status: RESOLVED | Noted: 2021-05-28 | Resolved: 2025-06-18

## 2025-06-18 PROBLEM — R11.0 NAUSEA: Status: RESOLVED | Noted: 2022-07-11 | Resolved: 2025-06-18

## 2025-06-18 PROBLEM — R93.89 ABNORMAL CT OF THE CHEST: Status: ACTIVE | Noted: 2025-06-18

## 2025-06-18 PROBLEM — E11.42 TYPE 2 DIABETES MELLITUS WITH DIABETIC POLYNEUROPATHY, WITHOUT LONG-TERM CURRENT USE OF INSULIN: Status: RESOLVED | Noted: 2021-05-26 | Resolved: 2025-06-18

## 2025-06-18 PROBLEM — E11.9 TYPE 2 DIABETES MELLITUS, WITHOUT LONG-TERM CURRENT USE OF INSULIN: Status: ACTIVE | Noted: 2025-06-18

## 2025-06-18 PROBLEM — I10 PRIMARY HYPERTENSION: Status: ACTIVE | Noted: 2025-06-18

## 2025-06-18 PROBLEM — J44.9 COPD (CHRONIC OBSTRUCTIVE PULMONARY DISEASE): Status: ACTIVE | Noted: 2025-06-18

## 2025-06-18 LAB
ALBUMIN SERPL-MCNC: 3.1 G/DL (ref 3.5–5.2)
ALP SERPL-CCNC: 113 UNIT/L (ref 40–150)
ALT SERPL-CCNC: 20 UNIT/L (ref 10–44)
ANION GAP (SMH): 11 MMOL/L (ref 8–16)
AST SERPL-CCNC: 23 UNIT/L (ref 11–45)
BILIRUB SERPL-MCNC: 1 MG/DL (ref 0.1–1)
BUN SERPL-MCNC: 17 MG/DL (ref 6–20)
CALCIUM SERPL-MCNC: 9.5 MG/DL (ref 8.7–10.5)
CHLORIDE SERPL-SCNC: 101 MMOL/L (ref 95–110)
CO2 SERPL-SCNC: 21 MMOL/L (ref 23–29)
CREAT SERPL-MCNC: 0.8 MG/DL (ref 0.5–1.4)
ERYTHROCYTE [DISTWIDTH] IN BLOOD BY AUTOMATED COUNT: 12.2 % (ref 11.5–14.5)
GFR SERPLBLD CREATININE-BSD FMLA CKD-EPI: >60 ML/MIN/1.73/M2
GLUCOSE SERPL-MCNC: 159 MG/DL (ref 70–110)
HCT VFR BLD AUTO: 47 % (ref 40–54)
HGB BLD-MCNC: 15.1 GM/DL (ref 14–18)
MAGNESIUM SERPL-MCNC: 1.9 MG/DL (ref 1.6–2.6)
MCH RBC QN AUTO: 29.8 PG (ref 27–31)
MCHC RBC AUTO-ENTMCNC: 32.1 G/DL (ref 32–36)
MCV RBC AUTO: 93 FL (ref 82–98)
OHS QRS DURATION: 100 MS
OHS QTC CALCULATION: 446 MS
PLATELET # BLD AUTO: 246 K/UL (ref 150–450)
PMV BLD AUTO: 8.8 FL (ref 9.2–12.9)
POCT GLUCOSE: 158 MG/DL (ref 70–110)
POCT GLUCOSE: 165 MG/DL (ref 70–110)
POCT GLUCOSE: 203 MG/DL (ref 70–110)
POCT GLUCOSE: 215 MG/DL (ref 70–110)
POTASSIUM SERPL-SCNC: 3.8 MMOL/L (ref 3.5–5.1)
PROT SERPL-MCNC: 8.3 GM/DL (ref 6–8.4)
RBC # BLD AUTO: 5.06 M/UL (ref 4.6–6.2)
SODIUM SERPL-SCNC: 133 MMOL/L (ref 136–145)
WBC # BLD AUTO: 12.44 K/UL (ref 3.9–12.7)

## 2025-06-18 PROCEDURE — 94664 DEMO&/EVAL PT USE INHALER: CPT | Mod: XB

## 2025-06-18 PROCEDURE — 99204 OFFICE O/P NEW MOD 45 MIN: CPT | Mod: ,,, | Performed by: INTERNAL MEDICINE

## 2025-06-18 PROCEDURE — 87206 SMEAR FLUORESCENT/ACID STAI: CPT

## 2025-06-18 PROCEDURE — 86480 TB TEST CELL IMMUN MEASURE: CPT

## 2025-06-18 PROCEDURE — 96366 THER/PROPH/DIAG IV INF ADDON: CPT

## 2025-06-18 PROCEDURE — 25000003 PHARM REV CODE 250: Performed by: NURSE PRACTITIONER

## 2025-06-18 PROCEDURE — 63600175 PHARM REV CODE 636 W HCPCS: Mod: UD | Performed by: STUDENT IN AN ORGANIZED HEALTH CARE EDUCATION/TRAINING PROGRAM

## 2025-06-18 PROCEDURE — 25000003 PHARM REV CODE 250

## 2025-06-18 PROCEDURE — 94640 AIRWAY INHALATION TREATMENT: CPT | Mod: XB

## 2025-06-18 PROCEDURE — 96372 THER/PROPH/DIAG INJ SC/IM: CPT

## 2025-06-18 PROCEDURE — 96375 TX/PRO/DX INJ NEW DRUG ADDON: CPT

## 2025-06-18 PROCEDURE — 94761 N-INVAS EAR/PLS OXIMETRY MLT: CPT

## 2025-06-18 PROCEDURE — 87070 CULTURE OTHR SPECIMN AEROBIC: CPT

## 2025-06-18 PROCEDURE — 87116 MYCOBACTERIA CULTURE: CPT

## 2025-06-18 PROCEDURE — 25000003 PHARM REV CODE 250: Performed by: STUDENT IN AN ORGANIZED HEALTH CARE EDUCATION/TRAINING PROGRAM

## 2025-06-18 PROCEDURE — 94660 CPAP INITIATION&MGMT: CPT

## 2025-06-18 PROCEDURE — 96367 TX/PROPH/DG ADDL SEQ IV INF: CPT

## 2025-06-18 PROCEDURE — 36415 COLL VENOUS BLD VENIPUNCTURE: CPT

## 2025-06-18 PROCEDURE — 63600175 PHARM REV CODE 636 W HCPCS

## 2025-06-18 PROCEDURE — 27000221 HC OXYGEN, UP TO 24 HOURS

## 2025-06-18 PROCEDURE — G0378 HOSPITAL OBSERVATION PER HR: HCPCS

## 2025-06-18 PROCEDURE — 25000242 PHARM REV CODE 250 ALT 637 W/ HCPCS

## 2025-06-18 PROCEDURE — 80053 COMPREHEN METABOLIC PANEL: CPT

## 2025-06-18 PROCEDURE — 83735 ASSAY OF MAGNESIUM: CPT

## 2025-06-18 PROCEDURE — 63600175 PHARM REV CODE 636 W HCPCS: Performed by: INTERNAL MEDICINE

## 2025-06-18 PROCEDURE — 99900035 HC TECH TIME PER 15 MIN (STAT)

## 2025-06-18 PROCEDURE — 85027 COMPLETE CBC AUTOMATED: CPT

## 2025-06-18 RX ORDER — METOPROLOL SUCCINATE 50 MG/1
50 TABLET, EXTENDED RELEASE ORAL DAILY
Status: DISCONTINUED | OUTPATIENT
Start: 2025-06-18 | End: 2025-06-19 | Stop reason: HOSPADM

## 2025-06-18 RX ORDER — IPRATROPIUM BROMIDE AND ALBUTEROL SULFATE 2.5; .5 MG/3ML; MG/3ML
3 SOLUTION RESPIRATORY (INHALATION)
Status: DISCONTINUED | OUTPATIENT
Start: 2025-06-19 | End: 2025-06-19 | Stop reason: HOSPADM

## 2025-06-18 RX ORDER — DOCUSATE SODIUM 100 MG/1
100 CAPSULE, LIQUID FILLED ORAL DAILY
Status: DISCONTINUED | OUTPATIENT
Start: 2025-06-18 | End: 2025-06-19 | Stop reason: HOSPADM

## 2025-06-18 RX ORDER — ALLOPURINOL 100 MG/1
100 TABLET ORAL DAILY
Status: DISCONTINUED | OUTPATIENT
Start: 2025-06-18 | End: 2025-06-19 | Stop reason: HOSPADM

## 2025-06-18 RX ORDER — METRONIDAZOLE 500 MG/100ML
500 INJECTION, SOLUTION INTRAVENOUS
Status: DISCONTINUED | OUTPATIENT
Start: 2025-06-18 | End: 2025-06-19 | Stop reason: HOSPADM

## 2025-06-18 RX ORDER — BUPROPION HYDROCHLORIDE 150 MG/1
150 TABLET ORAL DAILY
Status: DISCONTINUED | OUTPATIENT
Start: 2025-06-18 | End: 2025-06-19 | Stop reason: HOSPADM

## 2025-06-18 RX ORDER — PREDNISONE 20 MG/1
20 TABLET ORAL 2 TIMES DAILY
Status: DISCONTINUED | OUTPATIENT
Start: 2025-06-18 | End: 2025-06-19 | Stop reason: HOSPADM

## 2025-06-18 RX ORDER — ATORVASTATIN CALCIUM 10 MG/1
10 TABLET, FILM COATED ORAL DAILY
Status: DISCONTINUED | OUTPATIENT
Start: 2025-06-18 | End: 2025-06-19 | Stop reason: HOSPADM

## 2025-06-18 RX ADMIN — ALLOPURINOL 100 MG: 100 TABLET ORAL at 08:06

## 2025-06-18 RX ADMIN — DOCUSATE SODIUM 100 MG: 100 CAPSULE, LIQUID FILLED ORAL at 09:06

## 2025-06-18 RX ADMIN — PREDNISONE 20 MG: 20 TABLET ORAL at 01:06

## 2025-06-18 RX ADMIN — HYDROMORPHONE HYDROCHLORIDE 0.5 MG: 1 INJECTION, SOLUTION INTRAMUSCULAR; INTRAVENOUS; SUBCUTANEOUS at 05:06

## 2025-06-18 RX ADMIN — ATORVASTATIN CALCIUM 10 MG: 10 TABLET, FILM COATED ORAL at 08:06

## 2025-06-18 RX ADMIN — METRONIDAZOLE 500 MG: 5 INJECTION, SOLUTION INTRAVENOUS at 11:06

## 2025-06-18 RX ADMIN — VANCOMYCIN HYDROCHLORIDE 1750 MG: 1 INJECTION, POWDER, LYOPHILIZED, FOR SOLUTION INTRAVENOUS at 10:06

## 2025-06-18 RX ADMIN — INSULIN ASPART 2 UNITS: 100 INJECTION, SOLUTION INTRAVENOUS; SUBCUTANEOUS at 05:06

## 2025-06-18 RX ADMIN — METRONIDAZOLE 500 MG: 5 INJECTION, SOLUTION INTRAVENOUS at 07:06

## 2025-06-18 RX ADMIN — INSULIN ASPART 1 UNITS: 100 INJECTION, SOLUTION INTRAVENOUS; SUBCUTANEOUS at 09:06

## 2025-06-18 RX ADMIN — PANTOPRAZOLE SODIUM 40 MG: 40 TABLET, DELAYED RELEASE ORAL at 08:06

## 2025-06-18 RX ADMIN — METHOCARBAMOL 1000 MG: 500 TABLET ORAL at 05:06

## 2025-06-18 RX ADMIN — VANCOMYCIN HYDROCHLORIDE 2000 MG: 1 INJECTION, POWDER, LYOPHILIZED, FOR SOLUTION INTRAVENOUS at 08:06

## 2025-06-18 RX ADMIN — ENOXAPARIN SODIUM 40 MG: 40 INJECTION SUBCUTANEOUS at 09:06

## 2025-06-18 RX ADMIN — ENOXAPARIN SODIUM 40 MG: 40 INJECTION SUBCUTANEOUS at 08:06

## 2025-06-18 RX ADMIN — IPRATROPIUM BROMIDE AND ALBUTEROL SULFATE 3 ML: 2.5; .5 SOLUTION RESPIRATORY (INHALATION) at 01:06

## 2025-06-18 RX ADMIN — PREDNISONE 20 MG: 20 TABLET ORAL at 09:06

## 2025-06-18 RX ADMIN — BUPROPION HYDROCHLORIDE 150 MG: 150 TABLET, EXTENDED RELEASE ORAL at 08:06

## 2025-06-18 RX ADMIN — METOPROLOL SUCCINATE 50 MG: 50 TABLET, EXTENDED RELEASE ORAL at 08:06

## 2025-06-18 RX ADMIN — SIMETHICONE 80 MG: 80 TABLET, CHEWABLE ORAL at 09:06

## 2025-06-18 RX ADMIN — LEVOFLOXACIN 750 MG: 750 INJECTION, SOLUTION INTRAVENOUS at 08:06

## 2025-06-18 RX ADMIN — IPRATROPIUM BROMIDE AND ALBUTEROL SULFATE 3 ML: 2.5; .5 SOLUTION RESPIRATORY (INHALATION) at 08:06

## 2025-06-18 RX ADMIN — OXYCODONE 5 MG: 5 TABLET ORAL at 01:06

## 2025-06-18 RX ADMIN — METRONIDAZOLE 500 MG: 5 INJECTION, SOLUTION INTRAVENOUS at 04:06

## 2025-06-18 RX ADMIN — IPRATROPIUM BROMIDE AND ALBUTEROL SULFATE 3 ML: 2.5; .5 SOLUTION RESPIRATORY (INHALATION) at 07:06

## 2025-06-18 RX ADMIN — LIDOCAINE 1 PATCH: 50 PATCH CUTANEOUS at 05:06

## 2025-06-18 NOTE — CARE UPDATE
06/18/25 0753   Patient Assessment/Suction   Level of Consciousness (AVPU) alert   Respiratory Effort Shallow;Mild   Expansion/Accessory Muscles/Retractions no use of accessory muscles   All Lung Fields Breath Sounds diminished   Rhythm/Pattern, Respiratory tachypneic   Cough Frequency infrequent   Cough Type nonproductive   Skin Integrity   $ Wound Care Tech Time 15 min   Skin Appearance without discoloration   PRE-TX-O2   Device (Oxygen Therapy) nasal cannula   $ Is the patient on Low Flow Oxygen? Yes   Flow (L/min) (Oxygen Therapy) 2   SpO2 95 %   Pulse Oximetry Type Intermittent   $ Pulse Oximetry - Multiple Charge Pulse Oximetry - Multiple   Pulse 92   Resp (!) 32   Positioning HOB elevated 45 degrees   Aerosol Therapy   $ Aerosol Therapy Charges Aerosol Treatment   Respiratory Treatment Status (SVN) given   Treatment Route (SVN) mask   Patient Position HOB elevated   Post Treatment Assessment (SVN) increased aeration   Signs of Intolerance (SVN) none   Preset CPAP/BiPAP Settings   Mode Of Delivery CPAP;standby   $ CPAP/BiPAP Daily Charge 1   CPAP/BIPAP charged w/in last 24 h YES   Equipment Type [employer]

## 2025-06-18 NOTE — ASSESSMENT & PLAN NOTE
Body mass index is 39.2 kg/m². Morbid obesity complicates all aspects of disease management from diagnostic modalities to treatment.

## 2025-06-18 NOTE — ASSESSMENT & PLAN NOTE
Loculated fluid collection R pleura. Possible effusion or empyema?  -Pulmonary consulted  -Continue empiric antibiotics with vancomycin, Levaquin and Flagyl  -NC O2  -Duonebs  -Follow up respiratory cultures

## 2025-06-18 NOTE — SUBJECTIVE & OBJECTIVE
"Interval History: see "Hospital Course"    Review of Systems   Respiratory:  Negative for cough and shortness of breath.    Cardiovascular:  Positive for chest pain.   Genitourinary:  Positive for flank pain.   Musculoskeletal:  Positive for back pain.     Objective:     Vital Signs (Most Recent):  Temp: 97.7 °F (36.5 °C) (06/18/25 0414)  Pulse: 94 (06/18/25 0414)  Resp: 18 (06/18/25 0533)  BP: 139/83 (06/18/25 0414)  SpO2: 95 % (06/18/25 0414) Vital Signs (24h Range):  Temp:  [96.9 °F (36.1 °C)-98.9 °F (37.2 °C)] 97.7 °F (36.5 °C)  Pulse:  [] 94  Resp:  [18-22] 18  SpO2:  [92 %-98 %] 95 %  BP: (122-176)/() 139/83     Weight: 127.5 kg (281 lb 1.4 oz)  Body mass index is 39.2 kg/m².    Intake/Output Summary (Last 24 hours) at 6/18/2025 0731  Last data filed at 6/18/2025 0300  Gross per 24 hour   Intake 200 ml   Output 375 ml   Net -175 ml         Physical Exam  Vitals and nursing note reviewed.   Constitutional:       Appearance: He is obese.   HENT:      Head: Normocephalic and atraumatic.      Right Ear: External ear normal.      Left Ear: External ear normal.      Nose: Nose normal.      Mouth/Throat:      Mouth: Mucous membranes are moist.   Eyes:      Extraocular Movements: Extraocular movements intact.      Conjunctiva/sclera: Conjunctivae normal.   Cardiovascular:      Rate and Rhythm: Normal rate and regular rhythm.      Heart sounds: Normal heart sounds.   Pulmonary:      Effort: No respiratory distress.      Breath sounds: Decreased breath sounds present.      Comments: 2 L NC.  Abdominal:      General: Bowel sounds are normal. There is no distension.      Palpations: Abdomen is soft.      Tenderness: There is no abdominal tenderness.   Musculoskeletal:         General: Normal range of motion.      Cervical back: Normal range of motion and neck supple.   Skin:     General: Skin is dry.   Neurological:      Mental Status: He is alert. Mental status is at baseline.   Psychiatric:         Mood and " Affect: Mood normal.         Behavior: Behavior normal.               Significant Labs: All pertinent labs within the past 24 hours have been reviewed.    Significant Imaging: I have reviewed all pertinent imaging results/findings within the past 24 hours.

## 2025-06-18 NOTE — H&P
"  [facility]  History & Physical    Patient Name: Nick Tam  MRN: [id number]  Patient Class: OP- Observation  Admission Date: 6/17/2025  Attending Physician: Dean Wheeler MD   Primary Care Provider: Floridalma Henry MD         Patient information was obtained from patient, spouse/SO, past medical records, and ER records.     Subjective:     Principal Problem:Abnormal CT of the chest    Chief Complaint:   Chief Complaint   Patient presents with    Chest Pain     Since last night.  Right chest and rib pain.  Hurts when he coughs    Cough        HPI: Nick Tam is a 55 year old male with a previous medical history of DMII, GERD, Gout, HTN, obesity, JORDAN on cpap, and COPD who presented to the ED for back pain and chest pain. The patient reports that three weeks ago he began having lower back pain and was using OTC tylenol/ibuprofen with no pain relief. Was seen at [place] and prescribed muscle relaxers and tramadol with no relief. He reports the pain has increased and is main in his right rib area and is unable to take a deep breath due to pain. The pain radiates from his back into his chest. Decreased appetite, chills, with diarrhea Sunday and N/V last night. Chest Xray negative for acute process. CTA negative for PE but in body of report read "Pleura: No significant volume of pleural fluid or pneumothorax.  No pleural based masses.  Oval fluid loculated in the posterior major fissure on the right". CBC showed a WBC of 13.63. Pro calcitonin negative, D-Dimer 2.05, Flu/Covid negative, CMP largely unremarkable, urine studies negative for infection and only showed 4 RBC. Lactic acid 1.5 and Troponin negative x3. EKG similar to previous with NSR and left anterior fascicular block and fusion complexes. Patient initiated on IV levofloxacin with flagyl added on upon admission. Noted to have oxygen saturation of 89-90% on room air that improved with oxygen. Patient has no " known history of TB, went a cruise to [place] once and never lived in a foreign country. No known family members with TB. Patient admitted by hospital medicine for further evaluation and management.        Past Medical History:   Diagnosis Date    Diabetes mellitus     GERD (gastroesophageal reflux disease)     Gout     Hypertension     Obesity        Past Surgical History:   Procedure Laterality Date    CHOLECYSTECTOMY      COLONOSCOPY N/A 7/28/2021    Procedure: COLONOSCOPY;  Surgeon: Joey Portillo MD;  Location: [facility] ENDO;  Service: Endoscopy;  Laterality: N/A;    ESOPHAGOGASTRODUODENOSCOPY N/A 7/28/2021    Procedure: EGD (ESOPHAGOGASTRODUODENOSCOPY);  Surgeon: Joey Portillo MD;  Location: [facility];  Service: Endoscopy;  Laterality: N/A;       Review of patient's allergies indicates:   Allergen Reactions    Shrimp Shortness Of Breath    Codeine     Pcn [penicillins]        No current facility-administered medications on file prior to encounter.     Current Outpatient Medications on File Prior to Encounter   Medication Sig    albuterol (VENTOLIN HFA) 90 mcg/actuation inhaler Inhale 2 puffs into the lungs every 6 (six) hours as needed for Wheezing. Rescue    allopurinoL (ZYLOPRIM) 100 MG tablet Take 1 tablet (100 mg total) by mouth once daily.    atorvastatin (LIPITOR) 10 MG tablet Take 1 tablet (10 mg total) by mouth once daily.    buPROPion (WELLBUTRIN XL) 150 MG TB24 tablet Take 1 tablet (150 mg total) by mouth once daily.    cloNIDine (CATAPRES) 0.2 MG tablet Take 1 tablet (0.2 mg total) by mouth once daily.    dapagliflozin propanediol (FARXIGA) 5 mg Tab tablet Take 1 tablet (5 mg total) by mouth once daily.    dicyclomine (BENTYL) 10 MG capsule TAKE ONE CAPSULE BY MOUTH FOUR TIMES DAILY **THANK YOU**    gabapentin (NEURONTIN) 600 MG tablet Take 1 tablet (600 mg total) by mouth nightly as needed (foot pain).    liraglutide 0.6 mg/0.1 mL, 18 mg/3 mL, subq PNIJ (VICTOZA 2-JORDON) 0.6 mg/0.1 mL (18 mg/3 mL) PnIj  "pen Inject 1.8 mg into the skin once daily.    metoprolol succinate (TOPROL-XL) 50 MG 24 hr tablet Take 1 tablet (50 mg total) by mouth once daily.    ondansetron (ZOFRAN-ODT) 4 MG TbDL Take 1 tablet (4 mg total) by mouth every 8 (eight) hours as needed (nausea).    pantoprazole (PROTONIX) 40 MG tablet Take 1 tablet (40 mg total) by mouth 2 (two) times daily.    pen needle, diabetic 32 gauge x 5/32" Ndle Use daily with victoza administration e11.9 RAQUEL 99    sildenafiL (VIAGRA) 100 MG tablet Take 1 tablet (100 mg total) by mouth daily as needed for Erectile Dysfunction.    sucralfate (CARAFATE) 1 gram tablet Take 1 tablet (1 g total) by mouth 3 (three) times daily as needed (acid reflux).    traMADoL (ULTRAM) 50 mg tablet Take 1 tablet (50 mg total) by mouth every 6 (six) hours as needed for Pain.    UNIFINE PENTIPS 32 gauge x 5/32" Ndle use with insulutin injection **THANK YOU**     Family History    None       Tobacco Use    Smoking status: Never    Smokeless tobacco: Never   Substance and Sexual Activity    Alcohol use: Not Currently    Drug use: Never    Sexual activity: Not Currently     Review of Systems   Constitutional:  Positive for appetite change and chills.   Respiratory:          Difficulty taking deep breath due to pain   Cardiovascular:  Positive for chest pain.   Gastrointestinal:  Positive for diarrhea and nausea.   Genitourinary:  Positive for flank pain.   Musculoskeletal:  Positive for back pain.   All other systems reviewed and are negative.    Objective:     Vital Signs (Most Recent):  Temp: 98.1 °F (36.7 °C) (06/17/25 2305)  Pulse: 101 (06/17/25 2305)  Resp: 18 (06/17/25 2335)  BP: 122/84 (06/17/25 2305)  SpO2: 98 % (06/17/25 2305) Vital Signs (24h Range):  Temp:  [96.9 °F (36.1 °C)-98.9 °F (37.2 °C)] 98.1 °F (36.7 °C)  Pulse:  [] 101  Resp:  [18-22] 18  SpO2:  [92 %-98 %] 98 %  BP: (122-176)/() 122/84     Weight: 127.5 kg (281 lb 1.4 oz)  Body mass index is 39.2 kg/m².     Physical " Exam  Vitals reviewed.   Constitutional:       General: He is not in acute distress.     Appearance: Normal appearance. He is obese.   HENT:      Head: Normocephalic and atraumatic.      Nose: Nose normal.      Mouth/Throat:      Mouth: Mucous membranes are moist.      Dentition: Abnormal dentition. Gingival swelling and dental caries present.   Eyes:      Conjunctiva/sclera: Conjunctivae normal.   Cardiovascular:      Rate and Rhythm: Normal rate and regular rhythm.      Pulses: Normal pulses.      Heart sounds: Normal heart sounds.   Pulmonary:      Breath sounds: Examination of the right-lower field reveals decreased breath sounds. Examination of the left-lower field reveals decreased breath sounds. Decreased breath sounds present.      Comments: Poor effort  Abdominal:      General: Abdomen is protuberant. Bowel sounds are normal.      Palpations: Abdomen is soft.      Tenderness: There is abdominal tenderness in the right upper quadrant, right lower quadrant, left upper quadrant and left lower quadrant.   Musculoskeletal:         General: Normal range of motion.        Arms:       Cervical back: Normal range of motion and neck supple.      Right lower le+ Edema present.      Left lower le+ Edema present.      Comments: Clavicle deformity that is chronic   Skin:     General: Skin is warm and moist.      Capillary Refill: Capillary refill takes less than 2 seconds.   Neurological:      General: No focal deficit present.      Mental Status: He is alert and oriented to person, place, and time. Mental status is at baseline.   Psychiatric:         Mood and Affect: Mood normal.         Behavior: Behavior normal.         Thought Content: Thought content normal.         Judgment: Judgment normal.                Significant Labs: All pertinent labs within the past 24 hours have been reviewed.  A1C:   Recent Labs   Lab 25  1033   HGBA1C 6.8*       Bilirubin:   Recent Labs   Lab 25  1655   BILITOT 1.0        BMP:   Recent Labs   Lab 06/17/25  1655   *   *   K 3.5      CO2 20*   BUN 14   CREATININE 0.9   CALCIUM 9.7     CBC:   Recent Labs   Lab 06/17/25  1655   WBC 13.63*   HGB 15.3   HCT 46.5        CMP:   Recent Labs   Lab 06/17/25  1655   *   K 3.5      CO2 20*   *   BUN 14   CREATININE 0.9   CALCIUM 9.7   PROT 8.8*   ALBUMIN 3.2*   BILITOT 1.0   ALKPHOS 131   AST 36   ALT 25   ANIONGAP 14       Lactic Acid:   Recent Labs   Lab 06/17/25  2212   LACTATE 1.5     POCT Glucose:   Recent Labs   Lab 06/17/25  2104 06/17/25  2146   POCTGLUCOSE 176* 166*     Troponin:   Recent Labs   Lab 06/17/25  1655 06/17/25  1704 06/17/25  2212   TROPONINIHS <3 <3 <3     Urine Studies:   Recent Labs   Lab 06/17/25  1807   APPEARANCEUA Clear   SPECGRAV >=1.030*   PROTEINUA 1+*   BILIRUBINUA Negative   UROBILINOGEN Negative   LEUKOCYTESUR Negative   RBCUA 4   WBCUA 4       Significant Imaging: I have reviewed all pertinent imaging results/findings within the past 24 hours.  EXAMINATION:  CTA CHEST NON CORONARY (PE STUDIES)     CLINICAL HISTORY:  Pulmonary embolism (PE) suspected, high prob;     TECHNIQUE:  Following the intravenous administration of 75 cc of Omnipaque 350 contrast material, 1.25 mm contiguous axial images were acquired through the chest utilizing the CT pulmonary angiogram protocol.  2-D coronal and sagittal reconstructed images of the chest and sagittal oblique MIP images of the pulmonary arterial system were generated from the source data.     COMPARISON:  None.     FINDINGS:  Pulmonary arterial system: This is a good quality examination for the evaluation of pulmonary thromboembolism with good opacification of the pulmonary arteries to the level of the segmental branches of the pulmonary arterial system. There is no evidence of acute pulmonary thromboembolism. No large saddle pulmonary embolism, enlargement of the main pulmonary artery, or secondary findings of right  ventricular strain.     Aorta and heart: The heart is normal in size.  No pericardial fluid.  No thoracic aortic aneurysm.  No thoracic aortic dissection.     Airways: Unremarkable.     Lymph nodes: No pathologically enlarged lymph nodes in the chest or axilla.     Lungs: Mild atelectatic type changes in the gravity dependent portions of the lung right greater than left.  The lungs are wise clear with no evidence of airspace consolidation, mass, or bronchiectasis.  No emphysematous lung architecture.     Pleura: No significant volume of pleural fluid or pneumothorax.  No pleural based masses.  Oval fluid loculated in the posterior major fissure on the right.     Visualized upper abdominal soft tissues: No significant abnormalities appreciated.     Bones: There is degenerative change of the thoracic spine.     Impression:     1. No evidence of acute pulmonary thromboembolism.  2. Atelectatic type changes in both lung bases right greater than left.        Electronically signed by:Vasu Vernon  Date:                                            06/17/2025  Time:                                           18:5      EXAMINATION:  XR CHEST PA AND LATERAL     CLINICAL HISTORY:  Chest Pain;     TECHNIQUE:  PA and lateral views of the chest were performed.     COMPARISON:  Chest x-ray of June 11, 2021 and July 26, 2024     FINDINGS:  The mediastinal and cardiac size and contours are normal.  The diaphragms of pleura are clear.  No intrapulmonary mass or infiltrate is seen.  There is irregularity of the proximal right clavicle suggesting a old fracture and possible nonunion.  Similar finding is not seen of the left clavicle.     Impression:     Irregularity of the proximal right clavicle may be due to old fracture and nonunion otherwise negative chest x-ray        Electronically signed by:Vasu Nagy MD  Date:                                            06/17/2025  Time:                                            "16:32  Assessment/Plan:     Assessment & Plan  Abnormal CT of the chest  Pneumonia vs. Abscess formation    "Pleura: No significant volume of pleural fluid or pneumothorax. No pleural based masses. Oval fluid loculated in the posterior major fissure on the right."  Negative for PE    -AFB  -TB quant Gold  -IV Levofloxacin   -IV flagyl    JORDAN on CPAP, 100% use  -CPAP QHS      Type 2 diabetes mellitus, without long-term current use of insulin  Patient's FSGs are uncontrolled due to hyperglycemia on current medication regimen.  Last A1c reviewed-   Lab Results   Component Value Date    HGBA1C 6.8 (H) 04/28/2025     Most recent fingerstick glucose reviewed-   Recent Labs   Lab 06/17/25 2104 06/17/25 2146   POCTGLUCOSE 176* 166*     Current correctional scale  Low  Increase anti-hyperglycemic dose as follows-   Antihyperglycemics (From admission, onward)      Start     Stop Route Frequency Ordered    06/17/25 2146  insulin aspart U-100 pen 0-5 Units         -- SubQ Before meals & nightly PRN 06/17/25 2049          Hold Oral hypoglycemics while patient is in the hospital.     COPD (chronic obstructive pulmonary disease)  Patient's COPD is controlled currently.  Patient is currently off COPD Pathway. Continue scheduled inhalers Antibiotics and Supplemental oxygen and monitor respiratory status closely.   Primary hypertension  Patient's blood pressure range in the last 24 hours was: BP  Min: 122/84  Max: 176/95.The patient's inpatient anti-hypertensive regimen is listed below:  Current Antihypertensives   Not verified    Plan  -Will monitor and treat PRN  VTE Risk Mitigation (From admission, onward)           Ordered     enoxaparin injection 40 mg  Every 12 hours         06/17/25 2049     IP VTE HIGH RISK PATIENT  Once         06/17/25 2049     Place sequential compression device  Until discontinued         06/17/25 2049                         On 06/17/2025, patient should be placed in hospital observation services under " my care in collaboration with Dr. Dean Wheeler MD.      Pharmacist Renal Dose Adjustment Note    Nick Tam is a 55 y.o. male being treated with the medication Enoxaparin    Patient Data:    Vital Signs (Most Recent):  Temp: 98.4 °F (36.9 °C) (06/17/25 1534)  Pulse: 101 (06/17/25 2103)  Resp: 20 (06/17/25 2103)  BP: (!) 168/97 (06/17/25 2103)  SpO2: (!) 94 % (06/17/25 2103) Vital Signs (72h Range):  Temp:  [98.4 °F (36.9 °C)]   Pulse:  []   Resp:  [20-22]   BP: (139-176)/()   SpO2:  [92 %-96 %]      Recent Labs   Lab 06/17/25  1655   CREATININE 0.9     Serum creatinine: 0.9 mg/dL 06/17/25 1655  Estimated creatinine clearance: 125.3 mL/min    Enoxaparin 40 mg daily will be changed to Enoxaparin 40 mg twice daily for prophylaxis  Due to BMI > 40    Pharmacist's Name: Emilee Garsia  Pharmacist's Extension: [phone]      So Elliott NP  Department of Hospital Medicine  [facility]/Surg

## 2025-06-18 NOTE — CONSULTS
Pulmonary/Critical Care Consult      Patient name: Nick Tam  MRN: [id number]  Date: 06/18/2025    Admit Date: 6/17/2025  Consult Requested By: Dean Wheeler MD    Reason for Consult: pleural fluid    HPI:    Pt is a 56 yo male with DM2, gout, GERD, HTN and obesity who complains of back pains (felt like a pulled muscle) which started 3 wks ago then yesterday started having anterior chest and rib stabbing pains which is intermittent and better w/ pain meds. He is also having productive cough with yellow phlegm and feeling SOB for about 2 days. On Monday he reports he was vomiting all night copious material and may have aspirated into the lungs. He is a nonsmoker but states he has a dx of COPD. He was exposed to secondhand smoke and had worked construction in the past.    Review of Systems    Review of Systems   Constitutional:  Positive for malaise/fatigue. Negative for chills, fever and weight loss.   HENT: Negative.     Eyes: Negative.    Respiratory:  Positive for cough, sputum production, shortness of breath and wheezing.    Cardiovascular:  Positive for chest pain and leg swelling.   Gastrointestinal:  Positive for constipation, nausea and vomiting.   Genitourinary: Negative.    Musculoskeletal: Negative.    Skin: Negative.    Neurological: Negative.    Endo/Heme/Allergies: Negative.    Psychiatric/Behavioral: Negative.         Past Medical History    Past Medical History:   Diagnosis Date    Diabetes mellitus     GERD (gastroesophageal reflux disease)     Gout     Hypertension     Obesity        Past Surgical History    Past Surgical History:   Procedure Laterality Date    CHOLECYSTECTOMY      COLONOSCOPY N/A 7/28/2021    Procedure: COLONOSCOPY;  Surgeon: Joey Portillo MD;  Location: [facility];  Service: Endoscopy;  Laterality: N/A;    ESOPHAGOGASTRODUODENOSCOPY N/A 7/28/2021    Procedure: EGD (ESOPHAGOGASTRODUODENOSCOPY);  Surgeon: Joey Portillo MD;  Location: [facility];  Service: Endoscopy;   Laterality: N/A;       Medications (scheduled):      albuterol-ipratropium  3 mL Nebulization Q6H WAKE    allopurinoL  100 mg Oral Daily    atorvastatin  10 mg Oral Daily    buPROPion  150 mg Oral Daily    enoxparin  40 mg Subcutaneous Q12H (prophylaxis, 0900/2100)    levoFLOXacin  750 mg Intravenous Q24H    LIDOcaine  1 patch Transdermal Q24H    metoprolol succinate  50 mg Oral Daily    metroNIDAZOLE IV (PEDS and ADULTS)  500 mg Intravenous Q8H    pantoprazole  40 mg Oral Daily    vancomycin (VANCOCIN) IV (PEDS and ADULTS)  1,750 mg Intravenous Q12H       Medications (infusions):         Medications (prn):       Current Facility-Administered Medications:     acetaminophen, 650 mg, Oral, Q4H PRN    aluminum-magnesium hydroxide-simethicone, 30 mL, Oral, QID PRN    dextrose 50%, 12.5 g, Intravenous, PRN    dextrose 50%, 25 g, Intravenous, PRN    diphenhydrAMINE, 25 mg, Intravenous, Q6H PRN    glucagon (human recombinant), 1 mg, Intramuscular, PRN    glucose, 16 g, Oral, PRN    glucose, 24 g, Oral, PRN    HYDROmorphone, 0.5 mg, Intravenous, Q4H PRN    insulin aspart U-100, 0-5 Units, Subcutaneous, QID (AC + HS) PRN    magnesium oxide, 800 mg, Oral, PRN    magnesium oxide, 800 mg, Oral, PRN    methocarbamoL, 1,000 mg, Oral, TID PRN    naloxone, 0.02 mg, Intravenous, PRN    ondansetron, 4 mg, Intravenous, Q8H PRN    oxyCODONE, 5 mg, Oral, Q6H PRN    potassium bicarbonate, 35 mEq, Oral, PRN    potassium bicarbonate, 50 mEq, Oral, PRN    potassium bicarbonate, 60 mEq, Oral, PRN    prochlorperazine, 5 mg, Intravenous, Q6H PRN    simethicone, 1 tablet, Oral, QID PRN    sodium chloride 0.9%, 10 mL, Intravenous, Q12H PRN    Pharmacy to dose Vancomycin consult, , , Once **AND** vancomycin - pharmacy to dose, , Intravenous, pharmacy to manage frequency    Family History: No family history on file.    Social History: Tobacco: Tobacco Use History[1]                             EtOH:   Social History     Substance and Sexual  "Activity   Alcohol Use Not Currently                                Drugs:   Social History     Substance and Sexual Activity   Drug Use Never               Physical Exam    Vital signs:  Temp:  [96.9 °F (36.1 °C)-98.9 °F (37.2 °C)]   Pulse:  []   Resp:  [15-32]   BP: (122-176)/()   SpO2:  [92 %-98 %]     Intake/Output:   Intake/Output Summary (Last 24 hours) at 6/18/2025 1051  Last data filed at 6/18/2025 0300  Gross per 24 hour   Intake 200 ml   Output 375 ml   Net -175 ml        BMI: Estimated body mass index is 39.2 kg/m² as calculated from the following:    Height as of this encounter: 5' 11" (1.803 m).    Weight as of this encounter: 127.5 kg (281 lb 1.4 oz).    Physical Exam  Constitutional:       General: He is not in acute distress.     Appearance: Normal appearance. He is obese. He is diaphoretic. He is not ill-appearing.   HENT:      Head: Normocephalic and atraumatic.      Right Ear: External ear normal.      Left Ear: External ear normal.      Nose: Nose normal.      Mouth/Throat:      Mouth: Mucous membranes are moist.      Pharynx: Oropharynx is clear.      Comments: Poor dentition  Eyes:      General:         Right eye: No discharge.         Left eye: No discharge.      Conjunctiva/sclera: Conjunctivae normal.      Pupils: Pupils are equal, round, and reactive to light.   Cardiovascular:      Rate and Rhythm: Normal rate and regular rhythm.      Pulses: Normal pulses.      Heart sounds: Normal heart sounds. No murmur heard.     No friction rub.   Pulmonary:      Effort: Pulmonary effort is normal. No respiratory distress.      Breath sounds: Normal breath sounds. No stridor. No wheezing or rhonchi.   Abdominal:      General: Abdomen is flat. Bowel sounds are normal. There is no distension.      Palpations: Abdomen is soft.      Tenderness: There is no abdominal tenderness.   Musculoskeletal:         General: Normal range of motion.      Cervical back: Normal range of motion and neck " "supple. No rigidity.      Right lower leg: Edema present.      Left lower leg: Edema present.   Lymphadenopathy:      Cervical: No cervical adenopathy.   Skin:     General: Skin is warm.      Coloration: Skin is not jaundiced.      Findings: No lesion or rash.   Neurological:      General: No focal deficit present.      Mental Status: He is alert and oriented to person, place, and time.      Cranial Nerves: No cranial nerve deficit.      Motor: No weakness.   Psychiatric:         Mood and Affect: Mood normal.         Behavior: Behavior normal.         Thought Content: Thought content normal.         Judgment: Judgment normal.         Laboratory    Recent Labs   Lab 06/18/25  0450   WBC 12.44   RBC 5.06   HGB 15.1   HCT 47.0      MCV 93   MCH 29.8   MCHC 32.1       Recent Labs   Lab 06/18/25 0450   CALCIUM 9.5   ALBUMIN 3.1*   PROT 8.3   *   K 3.8   CO2 21*      BUN 17   CREATININE 0.8   ALKPHOS 113   ALT 20   AST 23   BILITOT 1.0       No results for input(s): "PT", "INR", "APTT" in the last 24 hours.    No results for input(s): "CPK", "CPKMB", "TROPONINI", "MB" in the last 24 hours.    Additional labs:     Microbiology:       Microbiology Results (last 7 days)       Procedure Component Value Units Date/Time    AFB Smear Only [[id number]] Collected: 06/18/25 0544    Order Status: Sent Specimen: Respiratory Updated: 06/18/25 1004    AFB Culture & Smear [[id number]] Collected: 06/18/25 0544    Order Status: Sent Specimen: Respiratory from Sputum Updated: 06/18/25 0548    Culture, Respiratory with Gram Stain [[id number]] Collected: 06/18/25 0544    Order Status: Sent Specimen: Respiratory from Sputum Updated: 06/18/25 0548    Blood culture x two cultures. Draw prior to antibiotics. [[id number]] Collected: 06/17/25 1949    Order Status: Sent Specimen: Blood Updated: 06/17/25 2003    Blood culture x two cultures. Draw prior to antibiotics. [[id number]] Collected: 06/17/25 1949    Order Status: Sent " Specimen: Blood Updated: 06/17/25 2003    Influenza A & B by Molecular [[id number]]  (Normal) Collected: 06/17/25 1627    Order Status: Completed Specimen: Nasal Swab Updated: 06/17/25 1716     INFLUENZA A MOLECULAR Negative     INFLUENZA B MOLECULAR  Negative            Radiology    CTA Chest Non-Coronary (PE Studies)  Narrative: EXAMINATION:  CTA CHEST NON CORONARY (PE STUDIES)    CLINICAL HISTORY:  Pulmonary embolism (PE) suspected, high prob;    TECHNIQUE:  Following the intravenous administration of 75 cc of Omnipaque 350 contrast material, 1.25 mm contiguous axial images were acquired through the chest utilizing the CT pulmonary angiogram protocol.  2-D coronal and sagittal reconstructed images of the chest and sagittal oblique MIP images of the pulmonary arterial system were generated from the source data.    COMPARISON:  None.    FINDINGS:  Pulmonary arterial system: This is a good quality examination for the evaluation of pulmonary thromboembolism with good opacification of the pulmonary arteries to the level of the segmental branches of the pulmonary arterial system. There is no evidence of acute pulmonary thromboembolism. No large saddle pulmonary embolism, enlargement of the main pulmonary artery, or secondary findings of right ventricular strain.    Aorta and heart: The heart is normal in size.  No pericardial fluid.  No thoracic aortic aneurysm.  No thoracic aortic dissection.    Airways: Unremarkable.    Lymph nodes: No pathologically enlarged lymph nodes in the chest or axilla.    Lungs: Mild atelectatic type changes in the gravity dependent portions of the lung right greater than left.  The lungs are wise clear with no evidence of airspace consolidation, mass, or bronchiectasis.  No emphysematous lung architecture.    Pleura: No significant volume of pleural fluid or pneumothorax.  No pleural based masses.  Oval fluid loculated in the posterior major fissure on the right.    Visualized upper  "abdominal soft tissues: No significant abnormalities appreciated.    Bones: There is degenerative change of the thoracic spine.  Impression: 1. No evidence of acute pulmonary thromboembolism.  2. Atelectatic type changes in both lung bases right greater than left.    Electronically signed by: Vasu Vernon  Date:    06/17/2025  Time:    18:59  X-Ray Chest PA And Lateral  Narrative: EXAMINATION:  XR CHEST PA AND LATERAL    CLINICAL HISTORY:  Chest Pain;    TECHNIQUE:  PA and lateral views of the chest were performed.    COMPARISON:  Chest x-ray of June 11, 2021 and July 26, 2024    FINDINGS:  The mediastinal and cardiac size and contours are normal.  The diaphragms of pleura are clear.  No intrapulmonary mass or infiltrate is seen.  There is irregularity of the proximal right clavicle suggesting a old fracture and possible nonunion.  Similar finding is not seen of the left clavicle.  Impression: Irregularity of the proximal right clavicle may be due to old fracture and nonunion otherwise negative chest x-ray    Electronically signed by: Vasu Nagy MD  Date:    06/17/2025  Time:    16:32        Additional Studies        Ventilator Information                  No results for input(s): "PH", "PCO2", "PO2", "HCO3", "POCSATURATED", "BE" in the last 72 hours.      Impression    Active Hospital Problems    Diagnosis  POA    *Abnormal CT of the chest [R93.89]  Yes    Type 2 diabetes mellitus, without long-term current use of insulin [E11.9]  Yes    COPD (chronic obstructive pulmonary disease) [J44.9]  Yes    Primary hypertension [I10]  Yes    Obesity (BMI 30-39.9) [E66.9]  Yes    JORDAN on CPAP, 100% use [G47.33]  Yes    Depression [F32.A]  Yes    Gastroesophageal reflux disease [K21.9]  Yes    Chronic gout of multiple sites [M1A.09X0]  Yes    Cardiovascular event risk, ASCVD 10-year risk 8.7%, 2021 [Z91.89]  Not Applicable      Resolved Hospital Problems   No resolved problems to display.       Plan    Acute " bronchitis  Pleuritic chest pain  Possible aspiration precipitating symptoms- he was having emesis mon night    - continue antibiotics  - add prednisone 20mg bid for pleurisy  - watch glucose, insulin sliding scale  - tiny amount of fluid in the right major fissure does not look worrisome, not enough for thoracentesis      Thank you for this consult.  I will follow with you while the patient is hospitalized.      Yaneli Anthony MD  Pulmonary & Critical Care Medicine                 [1]   Social History  Tobacco Use   Smoking Status Never   Smokeless Tobacco Never

## 2025-06-18 NOTE — ASSESSMENT & PLAN NOTE
Patient's blood pressure range in the last 24 hours was: BP  Min: 122/84  Max: 176/95.The patient's inpatient anti-hypertensive regimen is listed below:  Current Antihypertensives   Not verified    Plan  -Will monitor and treat PRN

## 2025-06-18 NOTE — ASSESSMENT & PLAN NOTE
Patient's blood pressure range in the last 24 hours was: BP  Min: 122/84  Max: 176/95.The patient's inpatient anti-hypertensive regimen is listed below:  Current Antihypertensives  metoprolol succinate (TOPROL-XL) 24 hr tablet 50 mg, Daily, OralNot verified    Plan  -Continue home metoprolol  -Continue to monitor

## 2025-06-18 NOTE — HPI
"Nick Tam is a 55 year old male with a previous medical history of DMII, GERD, Gout, HTN, obesity, JORDAN on cpap, and COPD who presented to the ED for back pain and chest pain. The patient reports that three weeks ago he began having lower back pain and was using OTC tylenol/ibuprofen with no pain relief. Was seen at [facility] and prescribed muscle relaxers and tramadol with no relief. He reports the pain has increased and is main in his right rib area and is unable to take a deep breath due to pain. The pain radiates from his back into his chest. Decreased appetite, chills, with diarrhea Sunday and N/V last night. Chest Xray negative for acute process. CTA negative for PE but in body of report read "Pleura: No significant volume of pleural fluid or pneumothorax.  No pleural based masses.  Oval fluid loculated in the posterior major fissure on the right". CBC showed a WBC of 13.63. Pro calcitonin negative, D-Dimer 2.05, Flu/Covid negative, CMP largely unremarkable, urine studies negative for infection and only showed 4 RBC. Lactic acid 1.5 and Troponin negative x3. EKG similar to previous with NSR and left anterior fascicular block and fusion complexes. Patient initiated on IV levofloxacin with flagyl added on upon admission. Noted to have oxygen saturation of 89-90% on room air that improved with oxygen. Patient admitted by hospital medicine for further evaluation and management.      "

## 2025-06-18 NOTE — PLAN OF CARE
Problem: Adult Inpatient Plan of Care  Goal: Plan of Care Review  Outcome: Progressing     Problem: Adult Inpatient Plan of Care  Goal: Absence of Hospital-Acquired Illness or Injury  Outcome: Progressing     Problem: Adult Inpatient Plan of Care  Goal: Optimal Comfort and Wellbeing  Outcome: Progressing     Problem: Bariatric Environmental Safety  Goal: Safety Maintained with Care  Outcome: Progressing     Problem: Diabetes Comorbidity  Goal: Blood Glucose Level Within Targeted Range  Outcome: Progressing     Problem: Wound  Goal: Optimal Pain Control and Function  Outcome: Progressing

## 2025-06-18 NOTE — PLAN OF CARE
[facility]  Initial Discharge Assessment       Primary Care Provider: Floridalma Henry MD    Admission Diagnosis: Acute hypoxemic respiratory failure [J96.01]    Admission Date: 6/17/2025  Expected Discharge Date: TBD    Met with pt at bedside to complete discharge assessment, verified PCP, pharmacy and information on facesheet.  No HH, dialysis or Coumadin.  See DME below.  Pt not sure who will provide transportation home.  Pt needs a glucometer.    Transition of Care Barriers: None    Payor: [place] [employer]ICA[employer] / Plan: [facility] / Product Type: Managed Medicaid /     Extended Emergency Contact Information  Primary Emergency Contact: Pete Holbrooka  Mobile Phone: [phone]  Relation: Sister  Preferred language: English   needed? No  Secondary Emergency Contact: Kasie Esparza  Mobile Phone: [phone]  Relation: Significant other  Preferred language: English   needed? No    Discharge Plan A: Home  Discharge Plan B: Home      [facility] [place] - [address]  [address]  [place] [address]  Phone: [phone] Fax: [phone]    [facility] [place] [facility] [address]  [address]  [place] [address]  Phone: [phone] Fax: [phone]      Initial Assessment (most recent)       Adult Discharge Assessment - 06/18/25 0854          Discharge Assessment    Assessment Type Discharge Planning Assessment     Confirmed/corrected address, phone number and insurance Yes     Confirmed Demographics Correct on Facesheet     Source of Information patient     People in Home significant other     Facility Arrived From: home     Do you expect to return to your current living situation? Yes     Do you have help at home or someone to help you manage your care at home? Yes     Who are your caregiver(s) and their phone number(s)? Kasie significant other     Prior to hospitilization cognitive status: Alert/Oriented      Current cognitive status: Alert/Oriented     Dressing/Bathing Difficulty no     Home Accessibility not wheelchair accessible     Home Layout Able to live on 1st floor     Equipment Currently Used at Home CPAP;grab bar;shower chair;bedside commode;cane, straight     Readmission within 30 days? No     Patient currently being followed by outpatient case management? No     Do you currently have service(s) that help you manage your care at home? No     Do you take prescription medications? Yes     Do you have prescription coverage? Yes     Do you have any problems affording any of your prescribed medications? No     Is the patient taking medications as prescribed? yes     Who is going to help you get home at discharge? not sure     How do you get to doctors appointments? car, drives self     Are you on dialysis? No     Do you take coumadin? No     Discharge Plan A Home     Discharge Plan B Home     DME Needed Upon Discharge  glucometer     Discharge Plan discussed with: Patient     Transition of Care Barriers None

## 2025-06-18 NOTE — ED TRIAGE NOTES
Pt came to the ER for worsening right sided chest pain/rib cage pain x3 weeks. Pt states he feels it is hard to take deep breaths. Pt reports a dry cough for 3 weeks. Denies falls or other injuries. Denies fever, chills

## 2025-06-18 NOTE — PLAN OF CARE
06/17/25 2104   Patient Assessment/Suction   Level of Consciousness (AVPU) alert   Respiratory Effort Normal;Unlabored   Expansion/Accessory Muscles/Retractions expansion symmetric   All Lung Fields Breath Sounds diminished   Rhythm/Pattern, Respiratory pattern regular   Cough Frequency infrequent   Cough Type nonproductive   PRE-TX-O2   Device (Oxygen Therapy) nasal cannula   Flow (L/min) (Oxygen Therapy) 2   SpO2 (!) 93 %   Pulse Oximetry Type Continuous   Pulse 101   Aerosol Therapy   $ Aerosol Therapy Charges Aerosol Treatment   Respiratory Treatment Status (SVN) given   Treatment Route (SVN) mask   Patient Position HOB elevated   Post Treatment Assessment (SVN) breath sounds unchanged   Signs of Intolerance (SVN) none   Breath Sounds Post-Respiratory Treatment   Post-treatment Heart Rate (beats/min) 96   Post-treatment Resp Rate (breaths/min) 20   Tobacco Cessation Intervention   Do you use any type of tobacco product? No   Respiratory Evaluation   $ Care Plan Tech Time 15 min   $ Respiratory Evaluation Complete   Evaluation For New Orders   Admitting Diagnosis PNX   Cardiac Diagnosis HTN   Pulmonary Diagnosis JORDAN   Home Oxygen   Has Home Oxygen? No   Home Aerosol, MDI, DPI, and Other Treatments/Therapies   Home Respiratory Therapy Per Patient/Review of Chart Yes   MDI Home Meds/Freq albuterol 2 puffs prn   Oxygen Care Plan   Oxygen Care Plan Per Protocol   SPO2 Goal (%) 92% non-cardiac   Rationale SpO2 is <92% (Non-cardiac Pt.)   Bronchodilator Care Plan   Bronchodilator Care Plan Aerosol   Aerosol Meds w/ frequency Albuterol - Ipratropium (DUO-NEB) 0.5mg-3mg(2.5ml) 3ml Nebulizer Solution2.5mg TID   Atelectasis Care Plan   Rationale No Rational Found   Airway Clearance Care Plan   Rationale No rationale found

## 2025-06-18 NOTE — ASSESSMENT & PLAN NOTE
Patient's FSGs are uncontrolled due to hyperglycemia on current medication regimen.  Last A1c reviewed-   Lab Results   Component Value Date    HGBA1C 6.8 (H) 04/28/2025     Most recent fingerstick glucose reviewed-   Recent Labs   Lab 06/17/25 2104 06/17/25 2146   POCTGLUCOSE 176* 166*     Current correctional scale  Low  Increase anti-hyperglycemic dose as follows-   Antihyperglycemics (From admission, onward)      Start     Stop Route Frequency Ordered    06/17/25 2146  insulin aspart U-100 pen 0-5 Units         -- SubQ Before meals & nightly PRN 06/17/25 2049          Hold Oral hypoglycemics while patient is in the hospital.

## 2025-06-18 NOTE — PROGRESS NOTES
Nick Tam [id number] is a 55 y.o. male who has been consulted for vancomycin dosing.    The patient has the following labs:     Date Creatinine (mg/dl)    BUN WBC Count   6/18/2025 Estimated Creatinine Clearance: 138.6 mL/min (based on SCr of 0.8 mg/dL). Lab Results   Component Value Date    BUN 17 06/18/2025     Lab Results   Component Value Date    WBC 12.44 06/18/2025        Current weight is 127.5 kg (281 lb 1.4 oz)    Pt being treated with vancomycin for Lower Respiratory Infection.    The patient is to receive 2000 mg on 6/18 at 0830.    The patient will be started on vancomycin at a dose of 1750 mg every 12 hours. The vancomycin trough has been ordered for 6/19 at 1930.  Target goal is 15-20.     Patient will be followed by pharmacy for changes in renal function, toxicity, and efficacy.        Thank you for allowing us to participate in this patient's care.     Laura Ortiz, ColbyD

## 2025-06-18 NOTE — PROGRESS NOTES
Pharmacist Renal Dose Adjustment Note    Nick Tam is a 55 y.o. male being treated with the medication Enoxaparin    Patient Data:    Vital Signs (Most Recent):  Temp: 98.4 °F (36.9 °C) (06/17/25 1534)  Pulse: 101 (06/17/25 2103)  Resp: 20 (06/17/25 2103)  BP: (!) 168/97 (06/17/25 2103)  SpO2: (!) 94 % (06/17/25 2103) Vital Signs (72h Range):  Temp:  [98.4 °F (36.9 °C)]   Pulse:  []   Resp:  [20-22]   BP: (139-176)/()   SpO2:  [92 %-96 %]      Recent Labs   Lab 06/17/25  1655   CREATININE 0.9     Serum creatinine: 0.9 mg/dL 06/17/25 1655  Estimated creatinine clearance: 125.3 mL/min    Enoxaparin 40 mg daily will be changed to Enoxaparin 40 mg twice daily for prophylaxis  Due to BMI > 40    Pharmacist's Name: Emilee Garsia  Pharmacist's Extension: [phone]

## 2025-06-18 NOTE — ASSESSMENT & PLAN NOTE
"Pneumonia vs. Abscess formation    "Pleura: No significant volume of pleural fluid or pneumothorax. No pleural based masses. Oval fluid loculated in the posterior major fissure on the right."  Negative for PE    -AFB  -TB quant Gold  -IV Levofloxacin   -IV flagyl    "

## 2025-06-18 NOTE — SUBJECTIVE & OBJECTIVE
Past Medical History:   Diagnosis Date    Diabetes mellitus     GERD (gastroesophageal reflux disease)     Gout     Hypertension     Obesity        Past Surgical History:   Procedure Laterality Date    CHOLECYSTECTOMY      COLONOSCOPY N/A 7/28/2021    Procedure: COLONOSCOPY;  Surgeon: Joey Portillo MD;  Location: [facility] ENDO;  Service: Endoscopy;  Laterality: N/A;    ESOPHAGOGASTRODUODENOSCOPY N/A 7/28/2021    Procedure: EGD (ESOPHAGOGASTRODUODENOSCOPY);  Surgeon: Joey Portillo MD;  Location: [facility] ENDO;  Service: Endoscopy;  Laterality: N/A;       Review of patient's allergies indicates:   Allergen Reactions    Shrimp Shortness Of Breath    Codeine     Pcn [penicillins]        No current facility-administered medications on file prior to encounter.     Current Outpatient Medications on File Prior to Encounter   Medication Sig    albuterol (VENTOLIN HFA) 90 mcg/actuation inhaler Inhale 2 puffs into the lungs every 6 (six) hours as needed for Wheezing. Rescue    allopurinoL (ZYLOPRIM) 100 MG tablet Take 1 tablet (100 mg total) by mouth once daily.    atorvastatin (LIPITOR) 10 MG tablet Take 1 tablet (10 mg total) by mouth once daily.    buPROPion (WELLBUTRIN XL) 150 MG TB24 tablet Take 1 tablet (150 mg total) by mouth once daily.    cloNIDine (CATAPRES) 0.2 MG tablet Take 1 tablet (0.2 mg total) by mouth once daily.    dapagliflozin propanediol (FARXIGA) 5 mg Tab tablet Take 1 tablet (5 mg total) by mouth once daily.    dicyclomine (BENTYL) 10 MG capsule TAKE ONE CAPSULE BY MOUTH FOUR TIMES DAILY **THANK YOU**    gabapentin (NEURONTIN) 600 MG tablet Take 1 tablet (600 mg total) by mouth nightly as needed (foot pain).    liraglutide 0.6 mg/0.1 mL, 18 mg/3 mL, subq PNIJ (VICTOZA 2-JORDON) 0.6 mg/0.1 mL (18 mg/3 mL) PnIj pen Inject 1.8 mg into the skin once daily.    metoprolol succinate (TOPROL-XL) 50 MG 24 hr tablet Take 1 tablet (50 mg total) by mouth once daily.    ondansetron (ZOFRAN-ODT) 4 MG TbDL Take 1 tablet (4 mg  "total) by mouth every 8 (eight) hours as needed (nausea).    pantoprazole (PROTONIX) 40 MG tablet Take 1 tablet (40 mg total) by mouth 2 (two) times daily.    pen needle, diabetic 32 gauge x 5/32" Ndle Use daily with victoza administration e11.9 RAQUEL 99    sildenafiL (VIAGRA) 100 MG tablet Take 1 tablet (100 mg total) by mouth daily as needed for Erectile Dysfunction.    sucralfate (CARAFATE) 1 gram tablet Take 1 tablet (1 g total) by mouth 3 (three) times daily as needed (acid reflux).    traMADoL (ULTRAM) 50 mg tablet Take 1 tablet (50 mg total) by mouth every 6 (six) hours as needed for Pain.    UNIFINE PENTIPS 32 gauge x 5/32" Ndle use with insulutin injection **THANK YOU**     Family History    None       Tobacco Use    Smoking status: Never    Smokeless tobacco: Never   Substance and Sexual Activity    Alcohol use: Not Currently    Drug use: Never    Sexual activity: Not Currently     Review of Systems   Constitutional:  Positive for appetite change and chills.   Respiratory:          Difficulty taking deep breath due to pain   Cardiovascular:  Positive for chest pain.   Gastrointestinal:  Positive for diarrhea and nausea.   Genitourinary:  Positive for flank pain.   Musculoskeletal:  Positive for back pain.   All other systems reviewed and are negative.    Objective:     Vital Signs (Most Recent):  Temp: 98.1 °F (36.7 °C) (06/17/25 2305)  Pulse: 101 (06/17/25 2305)  Resp: 18 (06/17/25 2335)  BP: 122/84 (06/17/25 2305)  SpO2: 98 % (06/17/25 2305) Vital Signs (24h Range):  Temp:  [96.9 °F (36.1 °C)-98.9 °F (37.2 °C)] 98.1 °F (36.7 °C)  Pulse:  [] 101  Resp:  [18-22] 18  SpO2:  [92 %-98 %] 98 %  BP: (122-176)/() 122/84     Weight: 127.5 kg (281 lb 1.4 oz)  Body mass index is 39.2 kg/m².     Physical Exam  Vitals reviewed.   Constitutional:       General: He is not in acute distress.     Appearance: Normal appearance. He is obese.   HENT:      Head: Normocephalic and atraumatic.      Nose: Nose normal. "      Mouth/Throat:      Mouth: Mucous membranes are moist.      Dentition: Abnormal dentition. Gingival swelling and dental caries present.   Eyes:      Conjunctiva/sclera: Conjunctivae normal.   Cardiovascular:      Rate and Rhythm: Normal rate and regular rhythm.      Pulses: Normal pulses.      Heart sounds: Normal heart sounds.   Pulmonary:      Breath sounds: Examination of the right-lower field reveals decreased breath sounds. Examination of the left-lower field reveals decreased breath sounds. Decreased breath sounds present.      Comments: Poor effort  Abdominal:      General: Abdomen is protuberant. Bowel sounds are normal.      Palpations: Abdomen is soft.      Tenderness: There is abdominal tenderness in the right upper quadrant, right lower quadrant, left upper quadrant and left lower quadrant.   Musculoskeletal:         General: Normal range of motion.        Arms:       Cervical back: Normal range of motion and neck supple.      Right lower le+ Edema present.      Left lower le+ Edema present.      Comments: Clavicle deformity that is chronic   Skin:     General: Skin is warm and moist.      Capillary Refill: Capillary refill takes less than 2 seconds.   Neurological:      General: No focal deficit present.      Mental Status: He is alert and oriented to person, place, and time. Mental status is at baseline.   Psychiatric:         Mood and Affect: Mood normal.         Behavior: Behavior normal.         Thought Content: Thought content normal.         Judgment: Judgment normal.                Significant Labs: All pertinent labs within the past 24 hours have been reviewed.  A1C:   Recent Labs   Lab 25  1033   HGBA1C 6.8*       Bilirubin:   Recent Labs   Lab 25  1655   BILITOT 1.0       BMP:   Recent Labs   Lab 25  1655   *   *   K 3.5      CO2 20*   BUN 14   CREATININE 0.9   CALCIUM 9.7     CBC:   Recent Labs   Lab 25  1655   WBC 13.63*   HGB 15.3   HCT  46.5        CMP:   Recent Labs   Lab 06/17/25  1655   *   K 3.5      CO2 20*   *   BUN 14   CREATININE 0.9   CALCIUM 9.7   PROT 8.8*   ALBUMIN 3.2*   BILITOT 1.0   ALKPHOS 131   AST 36   ALT 25   ANIONGAP 14       Lactic Acid:   Recent Labs   Lab 06/17/25  2212   LACTATE 1.5     POCT Glucose:   Recent Labs   Lab 06/17/25  2104 06/17/25  2146   POCTGLUCOSE 176* 166*     Troponin:   Recent Labs   Lab 06/17/25  1655 06/17/25  1704 06/17/25  2212   TROPONINIHS <3 <3 <3     Urine Studies:   Recent Labs   Lab 06/17/25  1807   APPEARANCEUA Clear   SPECGRAV >=1.030*   PROTEINUA 1+*   BILIRUBINUA Negative   UROBILINOGEN Negative   LEUKOCYTESUR Negative   RBCUA 4   WBCUA 4       Significant Imaging: I have reviewed all pertinent imaging results/findings within the past 24 hours.  EXAMINATION:  CTA CHEST NON CORONARY (PE STUDIES)     CLINICAL HISTORY:  Pulmonary embolism (PE) suspected, high prob;     TECHNIQUE:  Following the intravenous administration of 75 cc of Omnipaque 350 contrast material, 1.25 mm contiguous axial images were acquired through the chest utilizing the CT pulmonary angiogram protocol.  2-D coronal and sagittal reconstructed images of the chest and sagittal oblique MIP images of the pulmonary arterial system were generated from the source data.     COMPARISON:  None.     FINDINGS:  Pulmonary arterial system: This is a good quality examination for the evaluation of pulmonary thromboembolism with good opacification of the pulmonary arteries to the level of the segmental branches of the pulmonary arterial system. There is no evidence of acute pulmonary thromboembolism. No large saddle pulmonary embolism, enlargement of the main pulmonary artery, or secondary findings of right ventricular strain.     Aorta and heart: The heart is normal in size.  No pericardial fluid.  No thoracic aortic aneurysm.  No thoracic aortic dissection.     Airways: Unremarkable.     Lymph nodes: No  pathologically enlarged lymph nodes in the chest or axilla.     Lungs: Mild atelectatic type changes in the gravity dependent portions of the lung right greater than left.  The lungs are wise clear with no evidence of airspace consolidation, mass, or bronchiectasis.  No emphysematous lung architecture.     Pleura: No significant volume of pleural fluid or pneumothorax.  No pleural based masses.  Oval fluid loculated in the posterior major fissure on the right.     Visualized upper abdominal soft tissues: No significant abnormalities appreciated.     Bones: There is degenerative change of the thoracic spine.     Impression:     1. No evidence of acute pulmonary thromboembolism.  2. Atelectatic type changes in both lung bases right greater than left.        Electronically signed by:Vasu Vernon  Date:                                            06/17/2025  Time:                                           18:5      EXAMINATION:  XR CHEST PA AND LATERAL     CLINICAL HISTORY:  Chest Pain;     TECHNIQUE:  PA and lateral views of the chest were performed.     COMPARISON:  Chest x-ray of June 11, 2021 and July 26, 2024     FINDINGS:  The mediastinal and cardiac size and contours are normal.  The diaphragms of pleura are clear.  No intrapulmonary mass or infiltrate is seen.  There is irregularity of the proximal right clavicle suggesting a old fracture and possible nonunion.  Similar finding is not seen of the left clavicle.     Impression:     Irregularity of the proximal right clavicle may be due to old fracture and nonunion otherwise negative chest x-ray        Electronically signed by:Vasu Nagy MD  Date:                                            06/17/2025  Time:                                           16:32

## 2025-06-18 NOTE — PROGRESS NOTES
"[facility] Medicine  Progress Note    Patient Name: Nick Tam  MRN: [id number]  Patient Class: OP- Observation   Admission Date: 6/17/2025  Length of Stay: 0 days  Attending Physician: Dean Wheeler MD  Primary Care Provider: Floridalma Henry MD        Subjective     Principal Problem:Abnormal CT of the chest        HPI:  Nick Tam is a 55 year old male with a previous medical history of DMII, GERD, Gout, HTN, obesity, JORDAN on cpap, and COPD who presented to the ED for back pain and chest pain. The patient reports that three weeks ago he began having lower back pain and was using OTC tylenol/ibuprofen with no pain relief. Was seen at [facility] and prescribed muscle relaxers and tramadol with no relief. He reports the pain has increased and is main in his right rib area and is unable to take a deep breath due to pain. The pain radiates from his back into his chest. Decreased appetite, chills, with diarrhea Sunday and N/V last night. Chest Xray negative for acute process. CTA negative for PE but in body of report read "Pleura: No significant volume of pleural fluid or pneumothorax.  No pleural based masses.  Oval fluid loculated in the posterior major fissure on the right". CBC showed a WBC of 13.63. Pro calcitonin negative, D-Dimer 2.05, Flu/Covid negative, CMP largely unremarkable, urine studies negative for infection and only showed 4 RBC. Lactic acid 1.5 and Troponin negative x3. EKG similar to previous with NSR and left anterior fascicular block and fusion complexes. Patient initiated on IV levofloxacin with flagyl added on upon admission. Noted to have oxygen saturation of 89-90% on room air that improved with oxygen. Patient admitted by hospital medicine for further evaluation and management.        Overview/Hospital Course:  Nick Tam is a 55 year old male with a past medical history of HTN, JORDAN, DM, obesity, gout, and MDD/LAUREL who presented with right-sided back " "and chest pain. CTA chest showed a loculated fluid collection to this area at the pleural space. He is on empiric vancomycin, Levaquin and Flagyl. He is on Duonebs and 2 L NC O2. Blood and respiratory cultures are pending. Pulmonary has been consulted.    Interval History: see "Hospital Course"    Review of Systems   Respiratory:  Negative for cough and shortness of breath.    Cardiovascular:  Positive for chest pain.   Genitourinary:  Positive for flank pain.   Musculoskeletal:  Positive for back pain.     Objective:     Vital Signs (Most Recent):  Temp: 97.7 °F (36.5 °C) (06/18/25 0414)  Pulse: 94 (06/18/25 0414)  Resp: 18 (06/18/25 0533)  BP: 139/83 (06/18/25 0414)  SpO2: 95 % (06/18/25 0414) Vital Signs (24h Range):  Temp:  [96.9 °F (36.1 °C)-98.9 °F (37.2 °C)] 97.7 °F (36.5 °C)  Pulse:  [] 94  Resp:  [18-22] 18  SpO2:  [92 %-98 %] 95 %  BP: (122-176)/() 139/83     Weight: 127.5 kg (281 lb 1.4 oz)  Body mass index is 39.2 kg/m².    Intake/Output Summary (Last 24 hours) at 6/18/2025 0731  Last data filed at 6/18/2025 0300  Gross per 24 hour   Intake 200 ml   Output 375 ml   Net -175 ml         Physical Exam  Vitals and nursing note reviewed.   Constitutional:       Appearance: He is obese.   HENT:      Head: Normocephalic and atraumatic.      Right Ear: External ear normal.      Left Ear: External ear normal.      Nose: Nose normal.      Mouth/Throat:      Mouth: Mucous membranes are moist.   Eyes:      Extraocular Movements: Extraocular movements intact.      Conjunctiva/sclera: Conjunctivae normal.   Cardiovascular:      Rate and Rhythm: Normal rate and regular rhythm.      Heart sounds: Normal heart sounds.   Pulmonary:      Effort: No respiratory distress.      Breath sounds: Decreased breath sounds present.      Comments: 2 L NC.  Abdominal:      General: Bowel sounds are normal. There is no distension.      Palpations: Abdomen is soft.      Tenderness: There is no abdominal tenderness. "   Musculoskeletal:         General: Normal range of motion.      Cervical back: Normal range of motion and neck supple.   Skin:     General: Skin is dry.   Neurological:      Mental Status: He is alert. Mental status is at baseline.   Psychiatric:         Mood and Affect: Mood normal.         Behavior: Behavior normal.               Significant Labs: All pertinent labs within the past 24 hours have been reviewed.    Significant Imaging: I have reviewed all pertinent imaging results/findings within the past 24 hours.      Assessment & Plan  Abnormal CT of the chest  Loculated fluid collection R pleura. Possible effusion or empyema?  -Pulmonary consulted  -Continue empiric antibiotics with vancomycin, Levaquin and Flagyl  -NC O2  -Duonebs  -Follow up respiratory cultures    JORDAN on CPAP, 100% use  -CPAP QHS      Type 2 diabetes mellitus, without long-term current use of insulin  Patient's FSGs are uncontrolled due to hyperglycemia on current medication regimen.  Last A1c reviewed-   Lab Results   Component Value Date    HGBA1C 6.8 (H) 04/28/2025     Most recent fingerstick glucose reviewed-   Recent Labs   Lab 06/17/25  2104 06/17/25  2146   POCTGLUCOSE 176* 166*     Current correctional scale  Low  Increase anti-hyperglycemic dose as follows-   Antihyperglycemics (From admission, onward)      Start     Stop Route Frequency Ordered    06/17/25 2146  insulin aspart U-100 pen 0-5 Units         -- SubQ Before meals & nightly PRN 06/17/25 2049          Hold Oral hypoglycemics while patient is in the hospital.     COPD (chronic obstructive pulmonary disease)  Patient's COPD is controlled currently.  Patient is currently off COPD Pathway. Continue scheduled inhalers Antibiotics and Supplemental oxygen and monitor respiratory status closely.   Primary hypertension  Patient's blood pressure range in the last 24 hours was: BP  Min: 122/84  Max: 176/95.The patient's inpatient anti-hypertensive regimen is listed below:  Current  Antihypertensives  metoprolol succinate (TOPROL-XL) 24 hr tablet 50 mg, Daily, OralNot verified    Plan  -Continue home metoprolol  -Continue to monitor  Chronic gout of multiple sites  -Continue home allopurinol    Cardiovascular event risk, ASCVD 10-year risk 8.7%, 2021  -Continue statin    Gastroesophageal reflux disease  -PPI    Depression  -Continue home Wellbutrin  Obesity (BMI 30-39.9)  Body mass index is 39.2 kg/m². Morbid obesity complicates all aspects of disease management from diagnostic modalities to treatment.        VTE Risk Mitigation (From admission, onward)           Ordered     enoxaparin injection 40 mg  Every 12 hours         06/17/25 2049     IP VTE HIGH RISK PATIENT  Once         06/17/25 2049     Place sequential compression device  Until discontinued         06/17/25 2049                    Discharge Planning   MAGED:      Code Status: Full Code   Medical Readiness for Discharge Date:                            Dean Wheeler MD  Department of Hospital Medicine   [facility]/Surg

## 2025-06-19 ENCOUNTER — TELEPHONE (OUTPATIENT)
Dept: FAMILY MEDICINE | Facility: CLINIC | Age: 55
End: 2025-06-19
Payer: MEDICAID

## 2025-06-19 VITALS
OXYGEN SATURATION: 96 % | HEIGHT: 71 IN | DIASTOLIC BLOOD PRESSURE: 99 MMHG | RESPIRATION RATE: 20 BRPM | HEART RATE: 87 BPM | TEMPERATURE: 97 F | WEIGHT: 281.06 LBS | SYSTOLIC BLOOD PRESSURE: 164 MMHG | BODY MASS INDEX: 39.35 KG/M2

## 2025-06-19 PROBLEM — J20.9 ACUTE BRONCHITIS: Status: ACTIVE | Noted: 2025-06-18

## 2025-06-19 LAB
ANION GAP (SMH): 11 MMOL/L (ref 8–16)
BUN SERPL-MCNC: 17 MG/DL (ref 6–20)
CALCIUM SERPL-MCNC: 9.3 MG/DL (ref 8.7–10.5)
CHLORIDE SERPL-SCNC: 102 MMOL/L (ref 95–110)
CO2 SERPL-SCNC: 22 MMOL/L (ref 23–29)
CREAT SERPL-MCNC: 0.6 MG/DL (ref 0.5–1.4)
ERYTHROCYTE [DISTWIDTH] IN BLOOD BY AUTOMATED COUNT: 12 % (ref 11.5–14.5)
GFR SERPLBLD CREATININE-BSD FMLA CKD-EPI: >60 ML/MIN/1.73/M2
GLUCOSE SERPL-MCNC: 188 MG/DL (ref 70–110)
HCT VFR BLD AUTO: 44.1 % (ref 40–54)
HGB BLD-MCNC: 14.5 GM/DL (ref 14–18)
MCH RBC QN AUTO: 29.9 PG (ref 27–31)
MCHC RBC AUTO-ENTMCNC: 32.9 G/DL (ref 32–36)
MCV RBC AUTO: 91 FL (ref 82–98)
PLATELET # BLD AUTO: 249 K/UL (ref 150–450)
PMV BLD AUTO: 9.4 FL (ref 9.2–12.9)
POCT GLUCOSE: 182 MG/DL (ref 70–110)
POCT GLUCOSE: 199 MG/DL (ref 70–110)
POTASSIUM SERPL-SCNC: 4.2 MMOL/L (ref 3.5–5.1)
RBC # BLD AUTO: 4.85 M/UL (ref 4.6–6.2)
SODIUM SERPL-SCNC: 135 MMOL/L (ref 136–145)
WBC # BLD AUTO: 11.83 K/UL (ref 3.9–12.7)

## 2025-06-19 PROCEDURE — 94618 PULMONARY STRESS TESTING: CPT

## 2025-06-19 PROCEDURE — 96372 THER/PROPH/DIAG INJ SC/IM: CPT

## 2025-06-19 PROCEDURE — 99214 OFFICE O/P EST MOD 30 MIN: CPT | Mod: ,,, | Performed by: INTERNAL MEDICINE

## 2025-06-19 PROCEDURE — 25000003 PHARM REV CODE 250: Performed by: NURSE PRACTITIONER

## 2025-06-19 PROCEDURE — 94761 N-INVAS EAR/PLS OXIMETRY MLT: CPT

## 2025-06-19 PROCEDURE — 63600175 PHARM REV CODE 636 W HCPCS: Mod: UD | Performed by: STUDENT IN AN ORGANIZED HEALTH CARE EDUCATION/TRAINING PROGRAM

## 2025-06-19 PROCEDURE — 63600175 PHARM REV CODE 636 W HCPCS: Performed by: INTERNAL MEDICINE

## 2025-06-19 PROCEDURE — 94664 DEMO&/EVAL PT USE INHALER: CPT | Mod: XB

## 2025-06-19 PROCEDURE — 94640 AIRWAY INHALATION TREATMENT: CPT | Mod: XB

## 2025-06-19 PROCEDURE — 25000003 PHARM REV CODE 250: Mod: UD | Performed by: STUDENT IN AN ORGANIZED HEALTH CARE EDUCATION/TRAINING PROGRAM

## 2025-06-19 PROCEDURE — 63600175 PHARM REV CODE 636 W HCPCS: Mod: UD

## 2025-06-19 PROCEDURE — 80048 BASIC METABOLIC PNL TOTAL CA: CPT | Performed by: STUDENT IN AN ORGANIZED HEALTH CARE EDUCATION/TRAINING PROGRAM

## 2025-06-19 PROCEDURE — 99900035 HC TECH TIME PER 15 MIN (STAT)

## 2025-06-19 PROCEDURE — 36415 COLL VENOUS BLD VENIPUNCTURE: CPT

## 2025-06-19 PROCEDURE — 36415 COLL VENOUS BLD VENIPUNCTURE: CPT | Performed by: STUDENT IN AN ORGANIZED HEALTH CARE EDUCATION/TRAINING PROGRAM

## 2025-06-19 PROCEDURE — 25000003 PHARM REV CODE 250

## 2025-06-19 PROCEDURE — 27000221 HC OXYGEN, UP TO 24 HOURS

## 2025-06-19 PROCEDURE — 96366 THER/PROPH/DIAG IV INF ADDON: CPT

## 2025-06-19 PROCEDURE — 85027 COMPLETE CBC AUTOMATED: CPT | Performed by: STUDENT IN AN ORGANIZED HEALTH CARE EDUCATION/TRAINING PROGRAM

## 2025-06-19 PROCEDURE — 25000242 PHARM REV CODE 250 ALT 637 W/ HCPCS: Performed by: NURSE PRACTITIONER

## 2025-06-19 PROCEDURE — 86480 TB TEST CELL IMMUN MEASURE: CPT

## 2025-06-19 PROCEDURE — G0378 HOSPITAL OBSERVATION PER HR: HCPCS

## 2025-06-19 PROCEDURE — 94660 CPAP INITIATION&MGMT: CPT

## 2025-06-19 RX ORDER — MICONAZOLE NITRATE 2 G/100G
POWDER TOPICAL 2 TIMES DAILY
Status: DISCONTINUED | OUTPATIENT
Start: 2025-06-19 | End: 2025-06-19 | Stop reason: HOSPADM

## 2025-06-19 RX ORDER — ENOXAPARIN SODIUM 100 MG/ML
40 INJECTION SUBCUTANEOUS EVERY 24 HOURS
Status: DISCONTINUED | OUTPATIENT
Start: 2025-06-20 | End: 2025-06-19 | Stop reason: HOSPADM

## 2025-06-19 RX ORDER — LEVOFLOXACIN 750 MG/1
750 TABLET, FILM COATED ORAL DAILY
Qty: 5 TABLET | Refills: 0 | Status: SHIPPED | OUTPATIENT
Start: 2025-06-20 | End: 2025-06-26 | Stop reason: ALTCHOICE

## 2025-06-19 RX ORDER — PREDNISONE 20 MG/1
20 TABLET ORAL 2 TIMES DAILY
Qty: 7 TABLET | Refills: 0 | Status: SHIPPED | OUTPATIENT
Start: 2025-06-19 | End: 2025-06-26 | Stop reason: ALTCHOICE

## 2025-06-19 RX ADMIN — ATORVASTATIN CALCIUM 10 MG: 10 TABLET, FILM COATED ORAL at 08:06

## 2025-06-19 RX ADMIN — OXYCODONE 5 MG: 5 TABLET ORAL at 05:06

## 2025-06-19 RX ADMIN — ENOXAPARIN SODIUM 40 MG: 40 INJECTION SUBCUTANEOUS at 08:06

## 2025-06-19 RX ADMIN — ALLOPURINOL 100 MG: 100 TABLET ORAL at 08:06

## 2025-06-19 RX ADMIN — BUPROPION HYDROCHLORIDE 150 MG: 150 TABLET, EXTENDED RELEASE ORAL at 08:06

## 2025-06-19 RX ADMIN — ALUMINUM HYDROXIDE, MAGNESIUM HYDROXIDE, AND DIMETHICONE 30 ML: 200; 20; 200 SUSPENSION ORAL at 12:06

## 2025-06-19 RX ADMIN — METRONIDAZOLE 500 MG: 5 INJECTION, SOLUTION INTRAVENOUS at 11:06

## 2025-06-19 RX ADMIN — VANCOMYCIN HYDROCHLORIDE 1750 MG: 1 INJECTION, POWDER, LYOPHILIZED, FOR SOLUTION INTRAVENOUS at 08:06

## 2025-06-19 RX ADMIN — PREDNISONE 20 MG: 20 TABLET ORAL at 08:06

## 2025-06-19 RX ADMIN — IPRATROPIUM BROMIDE AND ALBUTEROL SULFATE 3 ML: 2.5; .5 SOLUTION RESPIRATORY (INHALATION) at 07:06

## 2025-06-19 RX ADMIN — DOCUSATE SODIUM 100 MG: 100 CAPSULE, LIQUID FILLED ORAL at 08:06

## 2025-06-19 RX ADMIN — PANTOPRAZOLE SODIUM 40 MG: 40 TABLET, DELAYED RELEASE ORAL at 08:06

## 2025-06-19 RX ADMIN — IPRATROPIUM BROMIDE AND ALBUTEROL SULFATE 3 ML: 2.5; .5 SOLUTION RESPIRATORY (INHALATION) at 01:06

## 2025-06-19 RX ADMIN — METOPROLOL SUCCINATE 50 MG: 50 TABLET, EXTENDED RELEASE ORAL at 08:06

## 2025-06-19 RX ADMIN — METRONIDAZOLE 500 MG: 5 INJECTION, SOLUTION INTRAVENOUS at 03:06

## 2025-06-19 NOTE — SUBJECTIVE & OBJECTIVE
"Interval History: see "Hospital Course"    Review of Systems   Respiratory:  Negative for cough and shortness of breath.    Cardiovascular:  Positive for chest pain.   Genitourinary:  Positive for flank pain.   Musculoskeletal:  Positive for back pain.     Objective:     Vital Signs (Most Recent):  Temp: 97.6 °F (36.4 °C) (06/19/25 0747)  Pulse: 82 (06/19/25 0747)  Resp: 20 (06/19/25 0721)  BP: (!) 143/84 (06/19/25 0747)  SpO2: (!) 94 % (06/19/25 0747) Vital Signs (24h Range):  Temp:  [97.6 °F (36.4 °C)-99.1 °F (37.3 °C)] 97.6 °F (36.4 °C)  Pulse:  [75-93] 82  Resp:  [16-28] 20  SpO2:  [93 %-97 %] 94 %  BP: (130-161)/() 143/84     Weight: 127.5 kg (281 lb 1.4 oz)  Body mass index is 39.2 kg/m².    Intake/Output Summary (Last 24 hours) at 6/19/2025 0809  Last data filed at 6/19/2025 0300  Gross per 24 hour   Intake 480 ml   Output 1500 ml   Net -1020 ml         Physical Exam  Vitals and nursing note reviewed.   Constitutional:       Appearance: He is obese.   HENT:      Head: Normocephalic and atraumatic.      Right Ear: External ear normal.      Left Ear: External ear normal.      Nose: Nose normal.      Mouth/Throat:      Mouth: Mucous membranes are moist.   Eyes:      Extraocular Movements: Extraocular movements intact.      Conjunctiva/sclera: Conjunctivae normal.   Cardiovascular:      Rate and Rhythm: Normal rate and regular rhythm.      Heart sounds: Normal heart sounds.   Pulmonary:      Effort: No respiratory distress.      Breath sounds: Decreased breath sounds present.      Comments: NC.  Abdominal:      General: Bowel sounds are normal. There is no distension.      Palpations: Abdomen is soft.      Tenderness: There is no abdominal tenderness.   Musculoskeletal:         General: Normal range of motion.      Cervical back: Normal range of motion and neck supple.   Skin:     General: Skin is dry.   Neurological:      Mental Status: He is alert. Mental status is at baseline.   Psychiatric:         Mood " and Affect: Mood normal.         Behavior: Behavior normal.               Significant Labs: All pertinent labs within the past 24 hours have been reviewed.    Significant Imaging: I have reviewed all pertinent imaging results/findings within the past 24 hours.

## 2025-06-19 NOTE — DISCHARGE SUMMARY
"[facility] Medicine  Discharge Summary      Patient Name: Nick Tam  MRN: [id number]  ADAM: [id number]  Patient Class: OP- Observation  Admission Date: 6/17/2025  Hospital Length of Stay: 0 days  Discharge Date and Time: No discharge date for patient encounter.  Attending Physician: Dean Wheeler MD   Discharging Provider: Dean Wheeler MD  Primary Care Provider: Floridalma Henry MD    Primary Care Team: Networked reference to record PCT     HPI:   Ncik Tam is a 55 year old male with a previous medical history of DMII, GERD, Gout, HTN, obesity, JORDAN on cpap, and COPD who presented to the ED for back pain and chest pain. The patient reports that three weeks ago he began having lower back pain and was using OTC tylenol/ibuprofen with no pain relief. Was seen at [place] and prescribed muscle relaxers and tramadol with no relief. He reports the pain has increased and is main in his right rib area and is unable to take a deep breath due to pain. The pain radiates from his back into his chest. Decreased appetite, chills, with diarrhea Sunday and N/V last night. Chest Xray negative for acute process. CTA negative for PE but in body of report read "Pleura: No significant volume of pleural fluid or pneumothorax.  No pleural based masses.  Oval fluid loculated in the posterior major fissure on the right". CBC showed a WBC of 13.63. Pro calcitonin negative, D-Dimer 2.05, Flu/Covid negative, CMP largely unremarkable, urine studies negative for infection and only showed 4 RBC. Lactic acid 1.5 and Troponin negative x3. EKG similar to previous with NSR and left anterior fascicular block and fusion complexes. Patient initiated on IV levofloxacin with flagyl added on upon admission. Noted to have oxygen saturation of 89-90% on room air that improved with oxygen. Patient admitted by hospital medicine for further evaluation and management.        * No surgery found *      Hospital Course: " "  Nick Tam is a 55 year old male with a past medical history of HTN, JORDAN, DM, obesity, gout, and MDD/LAUREL who presented with right-sided back and chest pain for which he is being treated for an acute bronchitis. CTA chest showed a small loculated fluid collection to this area at the major fissure. He was on empiric vancomycin, Levaquin and Flagyl. He was on Duonebs, prednisone and 2-3 L NC O2. Blood and respiratory cultures are negative. Pulmonary has been consulted. He was deemed stable for discharge 6/19 per Pulmonary and will complete a course of Levaquin and prednisone; a home O2 evaluation was ordered upon discharge.     Goals of Care Treatment Preferences:  Code Status: Full Code      SDOH Screening:  The patient was screened for utility difficulties, food insecurity, transport difficulties, housing insecurity, and interpersonal safety and there were no concerns identified this admission.     Consults:   Consults (From admission, onward)          Status Ordering Provider     Pharmacy to dose Vancomycin consult  Once        Provider:  (Not yet assigned)   Placed in "And" Linked Group    Acknowledged ANDRESSA BARRIENTOS     Inpatient consult to Pulmonology  Once        Provider:  Yaneli Anthony MD    Completed ANDRESSA BARRIENTOS            Assessment & Plan  Acute bronchitis  Loculated fluid collection R pleura; too small to drain. Possible aspiration.  -Pulmonary consulted  -Prednisone  -Continue Levaquin  -NC O2; home O2 evaluation  -Duonebs  -Follow up respiratory cultures    JORDAN on CPAP, 100% use  -CPAP QHS      Type 2 diabetes mellitus, without long-term current use of insulin  Patient's FSGs are uncontrolled due to hyperglycemia on current medication regimen.  Last A1c reviewed-   Lab Results   Component Value Date    HGBA1C 6.8 (H) 04/28/2025     Most recent fingerstick glucose reviewed-   Recent Labs   Lab 06/18/25  1507 06/18/25  2017 06/19/25  0740 06/19/25  1112   POCTGLUCOSE 203* 215* 182* 199* "     Current correctional scale  Low  Increase anti-hyperglycemic dose as follows-   Antihyperglycemics (From admission, onward)      Start     Stop Route Frequency Ordered    06/17/25 2146  insulin aspart U-100 pen 0-5 Units         -- SubQ Before meals & nightly PRN 06/17/25 2049          Hold Oral hypoglycemics while patient is in the hospital.     COPD (chronic obstructive pulmonary disease)  Patient's COPD is controlled currently.  Patient is currently off COPD Pathway. Continue scheduled inhalers, prednisone, Antibiotics and Supplemental oxygen and monitor respiratory status closely.   Primary hypertension  Patient's blood pressure range in the last 24 hours was: BP  Min: 130/69  Max: 164/99.The patient's inpatient anti-hypertensive regimen is listed below:  Current Antihypertensives  metoprolol succinate (TOPROL-XL) 24 hr tablet 50 mg, Daily, OralNot verified    Plan  -Continue home metoprolol  -Continue to monitor  Chronic gout of multiple sites  -Continue home allopurinol    Cardiovascular event risk, ASCVD 10-year risk 8.7%, 2021  -Continue statin    Gastroesophageal reflux disease  -PPI    Depression  -Continue home Wellbutrin  Obesity (BMI 30-39.9)  Body mass index is 39.2 kg/m². Morbid obesity complicates all aspects of disease management from diagnostic modalities to treatment.        Final Active Diagnoses:    Diagnosis Date Noted POA    PRINCIPAL PROBLEM:  Acute bronchitis [J20.9] 06/18/2025 Yes    COPD (chronic obstructive pulmonary disease) [J44.9] 06/18/2025 Yes    Type 2 diabetes mellitus, without long-term current use of insulin [E11.9] 06/18/2025 Yes    Primary hypertension [I10] 06/18/2025 Yes    Obesity (BMI 30-39.9) [E66.9] 06/18/2025 Yes    JORDAN on CPAP, 100% use [G47.33] 07/25/2022 Yes    Depression [F32.A] 06/10/2021 Yes    Gastroesophageal reflux disease [K21.9] 06/07/2021 Yes    Chronic gout of multiple sites [M1A.09X0] 05/28/2021 Yes    Cardiovascular event risk, ASCVD 10-year risk 8.7%,  2021 [Z91.89] 05/28/2021 Not Applicable      Problems Resolved During this Admission:       Discharged Condition: stable    Disposition: Home or Self Care    Follow Up:   Follow-up Information       Floridalma Henry MD Follow up in 2 week(s).    Specialty: Family Medicine  Contact information:  [address] [place] [address]  #[place] [address]  [phone]                           Patient Instructions:      Diet diabetic     Notify your health care provider if you experience any of the following:  increased confusion or weakness     Notify your health care provider if you experience any of the following:  persistent dizziness, light-headedness, or visual disturbances     Notify your health care provider if you experience any of the following:  severe persistent headache     Notify your health care provider if you experience any of the following:  difficulty breathing or increased cough     Notify your health care provider if you experience any of the following:  severe uncontrolled pain     Notify your health care provider if you experience any of the following:  persistent nausea and vomiting or diarrhea     Notify your health care provider if you experience any of the following:  temperature >100.4     Activity as tolerated       Significant Diagnostic Studies: Labs: All labs within the past 24 hours have been reviewed    Pending Diagnostic Studies:       Procedure Component Value Units Date/Time    Quantiferon Gold TB [[id number]] Collected: 06/18/25 0450    Order Status: Sent Lab Status: In process Updated: 06/19/25 0657    Specimen: Blood     Narrative:      The following orders were created for panel order Quantiferon Gold TB.  Procedure                               Abnormality         Status                     ---------                               -----------         ------                     Quantiferon Gold TB[[id number]]                             In process                 Quantiferon Gold TB -  NIL[[id number]]                                                  Quantiferon Gold TB - T...[[id number]]                                                 Quantiferon Gold TB - T...[[id number]]                                                   Please view results for these tests on the individual orders.    Quantiferon Gold TB [[id number]] Collected: 06/19/25 0646    Order Status: Sent Lab Status: In process Updated: 06/19/25 0657    Specimen: Blood            Medications:  Reconciled Home Medications:      Medication List        START taking these medications      levoFLOXacin 750 MG tablet  Commonly known as: LEVAQUIN  Take 1 tablet (750 mg total) by mouth once daily. for 5 days  Start taking on: June 20, 2025     predniSONE 20 MG tablet  Commonly known as: DELTASONE  Take 1 tablet (20 mg total) by mouth 2 (two) times daily. for 7 doses            CONTINUE taking these medications      albuterol 90 mcg/actuation inhaler  Commonly known as: VENTOLIN HFA  Inhale 2 puffs into the lungs every 6 (six) hours as needed for Wheezing. Rescue     allopurinoL 100 MG tablet  Commonly known as: ZYLOPRIM  Take 1 tablet (100 mg total) by mouth once daily.     atorvastatin 10 MG tablet  Commonly known as: LIPITOR  Take 1 tablet (10 mg total) by mouth once daily.     buPROPion 150 MG TB24 tablet  Commonly known as: WELLBUTRIN XL  Take 1 tablet (150 mg total) by mouth once daily.     cloNIDine 0.2 MG tablet  Commonly known as: CATAPRES  Take 1 tablet (0.2 mg total) by mouth once daily.     dapagliflozin propanediol 5 mg Tab tablet  Commonly known as: FARXIGA  Take 1 tablet (5 mg total) by mouth once daily.     dicyclomine 10 MG capsule  Commonly known as: BENTYL  TAKE ONE CAPSULE BY MOUTH FOUR TIMES DAILY **THANK YOU**     gabapentin 600 MG tablet  Commonly known as: NEURONTIN  Take 1 tablet (600 mg total) by mouth nightly as needed (foot pain).     liraglutide 0.6 mg/0.1 mL (18 mg/3 mL) subq PNIJ 0.6 mg/0.1 mL (18 mg/3 mL) Pnij  "pen  Commonly known as: VICTOZA 2-JORDON  Inject 1.8 mg into the skin once daily.     metoprolol succinate 50 MG 24 hr tablet  Commonly known as: TOPROL-XL  Take 1 tablet (50 mg total) by mouth once daily.     ondansetron 4 MG Tbdl  Commonly known as: ZOFRAN-ODT  Take 1 tablet (4 mg total) by mouth every 8 (eight) hours as needed (nausea).     pantoprazole 40 MG tablet  Commonly known as: PROTONIX  Take 1 tablet (40 mg total) by mouth 2 (two) times daily.     sildenafiL 100 MG tablet  Commonly known as: VIAGRA  Take 1 tablet (100 mg total) by mouth daily as needed for Erectile Dysfunction.     sucralfate 1 gram tablet  Commonly known as: CARAFATE  Take 1 tablet (1 g total) by mouth 3 (three) times daily as needed (acid reflux).     traMADoL 50 mg tablet  Commonly known as: ULTRAM  Take 1 tablet (50 mg total) by mouth every 6 (six) hours as needed for Pain.     * UNIFINE PENTIPS 32 gauge x 5/32" Ndle  Generic drug: pen needle, diabetic  use with insulutin injection **THANK YOU**     * pen needle, diabetic 32 gauge x 5/32" Ndle  Use daily with victoza administration e11.9 RAQUEL 99           * This list has 2 medication(s) that are the same as other medications prescribed for you. Read the directions carefully, and ask your doctor or other care provider to review them with you.                  Indwelling Lines/Drains at time of discharge:   Lines/Drains/Airways       None                   Time spent on the discharge of patient: 37 minutes         Dean Wheeler MD  Department of Hospital Medicine  [facility]/Surg  "

## 2025-06-19 NOTE — PLAN OF CARE
Problem: Adult Inpatient Plan of Care  Goal: Plan of Care Review  Outcome: Progressing  Flowsheets (Taken 6/19/2025 0512)  Plan of Care Reviewed With:   patient   spouse  Goal: Patient-Specific Goal (Individualized)  Outcome: Progressing  Flowsheets (Taken 6/19/2025 0512)  Individualized Care Needs: none  Anxieties, Fears or Concerns: none  Patient/Family-Specific Goals (Include Timeframe): none  Goal: Absence of Hospital-Acquired Illness or Injury  Outcome: Progressing  Goal: Optimal Comfort and Wellbeing  Outcome: Progressing  Goal: Readiness for Transition of Care  Outcome: Progressing     Problem: Bariatric Environmental Safety  Goal: Safety Maintained with Care  Outcome: Progressing     Problem: Diabetes Comorbidity  Goal: Blood Glucose Level Within Targeted Range  Outcome: Progressing   Simethicone given for gas pain, x1 assist with walker to walk around room. PRN maalox given. Stool softener added. Abx therapy maintained. O2 @ 3LNC. Spouse at bedside, plan of care reviewed with patient and spouse.

## 2025-06-19 NOTE — PROGRESS NOTES
Pharmacist Renal Dose Adjustment Note    Nick Tam is a 55 y.o. male being treated with Enoxaparin    Patient Data:    Vital Signs (Most Recent):  Temp: 97.4 °F (36.3 °C) (06/19/25 1151)  Pulse: 83 (06/19/25 1151)  Resp: 20 (06/19/25 0721)  BP: (!) 164/99 (06/19/25 1151)  SpO2: (!) 94 % (06/19/25 1151) Vital Signs (72h Range):  Temp:  [96.9 °F (36.1 °C)-99.1 °F (37.3 °C)]   Pulse:  []   Resp:  [15-32]   BP: (122-176)/()   SpO2:  [92 %-98 %]      Recent Labs   Lab 06/17/25  1655 06/18/25  0450 06/19/25  0554   CREATININE 0.9 0.8 0.6     Serum creatinine: 0.6 mg/dL 06/19/25 0554  Estimated creatinine clearance: 184.8 mL/min    Enoxaparin 40 mg q12h will be changed to 40 mg daily per [facility] renal policy.    Pharmacist's Name: Liz Helm  Pharmacist's Extension: [phone]

## 2025-06-19 NOTE — CARE UPDATE
06/19/25 1335   Patient Assessment/Suction   Level of Consciousness (AVPU) alert   Home Oxygen Qualification   $ Home O2 Qualification Pulmonary Stress Test/6 min walk;Tech time 15 minutes   Room Air SpO2 At Rest 95 %   Room Air SpO2 During Ambulation 92 %   SpO2 Post Ambulation 96 %   Post Ambulation Heart Rate 87 bpm

## 2025-06-19 NOTE — ASSESSMENT & PLAN NOTE
Patient's COPD is controlled currently.  Patient is currently off COPD Pathway. Continue scheduled inhalers, prednisone, Antibiotics and Supplemental oxygen and monitor respiratory status closely.

## 2025-06-19 NOTE — PROGRESS NOTES
"[facility] Medicine  Progress Note    Patient Name: Nick Tam  MRN: [id number]  Patient Class: OP- Observation   Admission Date: 6/17/2025  Length of Stay: 0 days  Attending Physician: Dean Wheeler MD  Primary Care Provider: Floridalma Henry MD        Subjective     Principal Problem:Acute bronchitis        HPI:  Nick Tam is a 55 year old male with a previous medical history of DMII, GERD, Gout, HTN, obesity, JORDAN on cpap, and COPD who presented to the ED for back pain and chest pain. The patient reports that three weeks ago he began having lower back pain and was using OTC tylenol/ibuprofen with no pain relief. Was seen at [place] and prescribed muscle relaxers and tramadol with no relief. He reports the pain has increased and is main in his right rib area and is unable to take a deep breath due to pain. The pain radiates from his back into his chest. Decreased appetite, chills, with diarrhea Sunday and N/V last night. Chest Xray negative for acute process. CTA negative for PE but in body of report read "Pleura: No significant volume of pleural fluid or pneumothorax.  No pleural based masses.  Oval fluid loculated in the posterior major fissure on the right". CBC showed a WBC of 13.63. Pro calcitonin negative, D-Dimer 2.05, Flu/Covid negative, CMP largely unremarkable, urine studies negative for infection and only showed 4 RBC. Lactic acid 1.5 and Troponin negative x3. EKG similar to previous with NSR and left anterior fascicular block and fusion complexes. Patient initiated on IV levofloxacin with flagyl added on upon admission. Noted to have oxygen saturation of 89-90% on room air that improved with oxygen. Patient admitted by hospital medicine for further evaluation and management.        Overview/Hospital Course:  Nick Tam is a 55 year old male with a past medical history of HTN, JORDAN, DM, obesity, gout, and MDD/LAUREL who presented with right-sided back and chest " "pain for which he is being treated for an acute bronchitis. CTA chest showed a small loculated fluid collection to this area at the major fissure. He is on empiric vancomycin, Levaquin and Flagyl. He is on Duonebs, prednisone and 2-3 L NC O2. Blood and respiratory cultures are pending. Pulmonary has been consulted.    Interval History: see "Hospital Course"    Review of Systems   Respiratory:  Negative for cough and shortness of breath.    Cardiovascular:  Positive for chest pain.   Genitourinary:  Positive for flank pain.   Musculoskeletal:  Positive for back pain.     Objective:     Vital Signs (Most Recent):  Temp: 97.6 °F (36.4 °C) (06/19/25 0747)  Pulse: 82 (06/19/25 0747)  Resp: 20 (06/19/25 0721)  BP: (!) 143/84 (06/19/25 0747)  SpO2: (!) 94 % (06/19/25 0747) Vital Signs (24h Range):  Temp:  [97.6 °F (36.4 °C)-99.1 °F (37.3 °C)] 97.6 °F (36.4 °C)  Pulse:  [75-93] 82  Resp:  [16-28] 20  SpO2:  [93 %-97 %] 94 %  BP: (130-161)/() 143/84     Weight: 127.5 kg (281 lb 1.4 oz)  Body mass index is 39.2 kg/m².    Intake/Output Summary (Last 24 hours) at 6/19/2025 0809  Last data filed at 6/19/2025 0300  Gross per 24 hour   Intake 480 ml   Output 1500 ml   Net -1020 ml         Physical Exam  Vitals and nursing note reviewed.   Constitutional:       Appearance: He is obese.   HENT:      Head: Normocephalic and atraumatic.      Right Ear: External ear normal.      Left Ear: External ear normal.      Nose: Nose normal.      Mouth/Throat:      Mouth: Mucous membranes are moist.   Eyes:      Extraocular Movements: Extraocular movements intact.      Conjunctiva/sclera: Conjunctivae normal.   Cardiovascular:      Rate and Rhythm: Normal rate and regular rhythm.      Heart sounds: Normal heart sounds.   Pulmonary:      Effort: No respiratory distress.      Breath sounds: Decreased breath sounds present.      Comments: NC.  Abdominal:      General: Bowel sounds are normal. There is no distension.      Palpations: Abdomen " is soft.      Tenderness: There is no abdominal tenderness.   Musculoskeletal:         General: Normal range of motion.      Cervical back: Normal range of motion and neck supple.   Skin:     General: Skin is dry.   Neurological:      Mental Status: He is alert. Mental status is at baseline.   Psychiatric:         Mood and Affect: Mood normal.         Behavior: Behavior normal.               Significant Labs: All pertinent labs within the past 24 hours have been reviewed.    Significant Imaging: I have reviewed all pertinent imaging results/findings within the past 24 hours.      Assessment & Plan  Acute bronchitis  Loculated fluid collection R pleura; too small to drain. Possible aspiration.  -Pulmonary consulted  -Prednisone  -Continue empiric antibiotics with vancomycin, Levaquin and Flagyl  -NC O2  -Duonebs  -Follow up respiratory cultures    JORDAN on CPAP, 100% use  -CPAP QHS      Type 2 diabetes mellitus, without long-term current use of insulin  Patient's FSGs are uncontrolled due to hyperglycemia on current medication regimen.  Last A1c reviewed-   Lab Results   Component Value Date    HGBA1C 6.8 (H) 04/28/2025     Most recent fingerstick glucose reviewed-   Recent Labs   Lab 06/18/25  1103 06/18/25  1507 06/18/25  2017 06/19/25  0740   POCTGLUCOSE 165* 203* 215* 182*     Current correctional scale  Low  Increase anti-hyperglycemic dose as follows-   Antihyperglycemics (From admission, onward)      Start     Stop Route Frequency Ordered    06/17/25 2146  insulin aspart U-100 pen 0-5 Units         -- SubQ Before meals & nightly PRN 06/17/25 2049          Hold Oral hypoglycemics while patient is in the hospital.     COPD (chronic obstructive pulmonary disease)  Patient's COPD is controlled currently.  Patient is currently off COPD Pathway. Continue scheduled inhalers, prednisone, Antibiotics and Supplemental oxygen and monitor respiratory status closely.   Primary hypertension  Patient's blood pressure range in  the last 24 hours was: BP  Min: 130/69  Max: 161/100.The patient's inpatient anti-hypertensive regimen is listed below:  Current Antihypertensives  metoprolol succinate (TOPROL-XL) 24 hr tablet 50 mg, Daily, OralNot verified    Plan  -Continue home metoprolol  -Continue to monitor  Chronic gout of multiple sites  -Continue home allopurinol    Cardiovascular event risk, ASCVD 10-year risk 8.7%, 2021  -Continue statin    Gastroesophageal reflux disease  -PPI    Depression  -Continue home Wellbutrin  Obesity (BMI 30-39.9)  Body mass index is 39.2 kg/m². Morbid obesity complicates all aspects of disease management from diagnostic modalities to treatment.        VTE Risk Mitigation (From admission, onward)           Ordered     enoxaparin injection 40 mg  Every 12 hours         06/17/25 2049     IP VTE HIGH RISK PATIENT  Once         06/17/25 2049     Place sequential compression device  Until discontinued         06/17/25 2049                    Discharge Planning   MAGED: 6/20/2025     Code Status: Full Code   Medical Readiness for Discharge Date:   Discharge Plan A: Home                        Dean Wheeler MD  Department of Hospital Medicine   [facility]/Surg

## 2025-06-19 NOTE — ASSESSMENT & PLAN NOTE
Loculated fluid collection R pleura; too small to drain. Possible aspiration.  -Pulmonary consulted  -Prednisone  -Continue Levaquin  -NC O2; home O2 evaluation  -Danii  -Follow up respiratory cultures

## 2025-06-19 NOTE — CARE UPDATE
06/19/25 0721   Patient Assessment/Suction   Level of Consciousness (AVPU) alert   Respiratory Effort Unlabored   Expansion/Accessory Muscles/Retractions no use of accessory muscles;no retractions;expansion symmetric   All Lung Fields Breath Sounds Anterior:;Lateral:;diminished   Rhythm/Pattern, Respiratory unlabored;no shortness of breath reported   Cough Frequency infrequent   Cough Type congested;nonproductive   Skin Integrity   $ Wound Care Tech Time 15 min   Area Observed Bridge of nose   Skin Appearance without discoloration   PRE-TX-O2   Device (Oxygen Therapy) nasal cannula with humidification   $ Is the patient on Low Flow Oxygen? Yes   Flow (L/min) (Oxygen Therapy) 3   SpO2 95 %   Pulse Oximetry Type Intermittent   $ Pulse Oximetry - Multiple Charge Pulse Oximetry - Multiple   Pulse 75   Resp 20   Aerosol Therapy   $ Aerosol Therapy Charges Aerosol Treatment   Respiratory Treatment Status (SVN) given   Treatment Route (SVN) mask;oxygen   Patient Position HOB elevated   Signs of Intolerance (SVN) none   Breath Sounds Post-Respiratory Treatment   Throughout All Fields Post-Treatment aeration increased   Post-treatment Heart Rate (beats/min) 78   Post-treatment Resp Rate (breaths/min) 20   Vibratory PEP Therapy   $ Vibratory PEP Charges Aerobika Therapy   $ Vibratory PEP Tech Time Charges 15 min   Daily Review of Necessity (PEP Therapy) completed   Type (PEP Therapy) vibratory/oscillatory   Device (PEP Therapy) flutter   Route (PEP Therapy) mouthpiece   Breaths per Cycle (PEP Therapy) 10   Cycles (PEP Therapy) 1   Settings (PEP Therapy) PEP 5   Patient Position (PEP Therapy) HOB elevated   Signs of Intolerance (PEP Therapy) none   Preset CPAP/BiPAP Settings   Mode Of Delivery standby   CPAP/BIPAP charged w/in last 24 h YES

## 2025-06-19 NOTE — ASSESSMENT & PLAN NOTE
Patient's blood pressure range in the last 24 hours was: BP  Min: 130/69  Max: 164/99.The patient's inpatient anti-hypertensive regimen is listed below:  Current Antihypertensives  metoprolol succinate (TOPROL-XL) 24 hr tablet 50 mg, Daily, OralNot verified    Plan  -Continue home metoprolol  -Continue to monitor

## 2025-06-19 NOTE — TELEPHONE ENCOUNTER
----- Message from  Jessica sent at 6/19/2025  2:17 PM CDT -----  Regarding: hospital follow up  Good afternoon,    Pt being discharged today from [facility].  Attempted to schedule hospital follow up but no appointments available within 8-14 days.  Please contact pt to schedule hospital follow up.    Thank you,  Jessica

## 2025-06-19 NOTE — ASSESSMENT & PLAN NOTE
Loculated fluid collection R pleura; too small to drain. Possible aspiration.  -Pulmonary consulted  -Prednisone  -Continue empiric antibiotics with vancomycin, Levaquin and Flagyl  -NC O2  -Duonebs  -Follow up respiratory cultures

## 2025-06-19 NOTE — CARE UPDATE
06/18/25 2023   Patient Assessment/Suction   Level of Consciousness (AVPU) alert   Respiratory Effort Short of breath   Expansion/Accessory Muscles/Retractions no use of accessory muscles   All Lung Fields Breath Sounds Anterior:;diminished   Rhythm/Pattern, Respiratory shortness of breath   Cough Frequency infrequent   Cough Type congested;productive   PRE-TX-O2   Device (Oxygen Therapy) nasal cannula   $ Is the patient on Low Flow Oxygen? Yes   Flow (L/min) (Oxygen Therapy) 2   Oxygen Concentration (%) 28   SpO2 (!) 94 %   Pulse Oximetry Type Intermittent   $ Pulse Oximetry - Multiple Charge Pulse Oximetry - Multiple   Pulse 93   Resp 18   Aerosol Therapy   $ Aerosol Therapy Charges Aerosol Treatment   Respiratory Treatment Status (SVN) given   Treatment Route (SVN) mask;oxygen   Patient Position HOB elevated   Post Treatment Assessment (SVN) increased aeration   Signs of Intolerance (SVN) none   Breath Sounds Post-Respiratory Treatment   Throughout All Fields Post-Treatment All Fields   Throughout All Fields Post-Treatment Anterior:;aeration increased   Post-treatment Heart Rate (beats/min) 96   Post-treatment Resp Rate (breaths/min) 20   Vibratory PEP Therapy   $ Vibratory PEP Charges Aerobika Therapy   $ Vibratory PEP Equipment Aerobika Equipment   $ Vibratory PEP Tech Time Charges 15 min   Daily Review of Necessity (PEP Therapy) completed   Type (PEP Therapy) vibratory/oscillatory   Device (PEP Therapy) flutter   Route (PEP Therapy) instruction provided, initial;proper technique demonstrated   Breaths per Cycle (PEP Therapy) 10   Cycles (PEP Therapy) 1   PEP Pressure (cm H2O) -3   PEP Duration (min) 15   Settings (PEP Therapy) PEP 3   Patient Position (PEP Therapy) HOB elevated   Post Treatment Assessment (PEP) patient reports breathing improved   Signs of Intolerance (PEP Therapy) none   Preset CPAP/BiPAP Settings   Mode Of Delivery CPAP;standby   CPAP/BIPAP charged w/in last 24 h YES

## 2025-06-19 NOTE — ASSESSMENT & PLAN NOTE
Patient's FSGs are uncontrolled due to hyperglycemia on current medication regimen.  Last A1c reviewed-   Lab Results   Component Value Date    HGBA1C 6.8 (H) 04/28/2025     Most recent fingerstick glucose reviewed-   Recent Labs   Lab 06/18/25  1103 06/18/25  1507 06/18/25 2017 06/19/25  0740   POCTGLUCOSE 165* 203* 215* 182*     Current correctional scale  Low  Increase anti-hyperglycemic dose as follows-   Antihyperglycemics (From admission, onward)      Start     Stop Route Frequency Ordered    06/17/25 2146  insulin aspart U-100 pen 0-5 Units         -- SubQ Before meals & nightly PRN 06/17/25 2049          Hold Oral hypoglycemics while patient is in the hospital.

## 2025-06-19 NOTE — PROGRESS NOTES
Pulmonary/Critical Care Consult      Patient name: Nick Tam  MRN: [id number]  Date: 06/19/2025    Admit Date: 6/17/2025  Consult Requested By: Dean Wheeler MD    Reason for Consult: pleural fluid    HPI:    Pt is a 54 yo male with DM2, gout, GERD, HTN and obesity who complains of back pains (felt like a pulled muscle) which started 3 wks ago then yesterday started having anterior chest and rib stabbing pains which is intermittent and better w/ pain meds. He is also having productive cough with yellow phlegm and feeling SOB for about 2 days. On Monday he reports he was vomiting all night copious material and may have aspirated into the lungs. He is a nonsmoker but states he has a dx of COPD. He was exposed to secondhand smoke and had worked construction in the past.    6/19- pt is feeling much better. Chest pain is gone. He feels ready for discharge    Review of Systems    Review of Systems   Constitutional:  Positive for malaise/fatigue. Negative for chills, fever and weight loss.   HENT: Negative.     Eyes: Negative.    Respiratory:  Positive for cough, sputum production, shortness of breath and wheezing.    Cardiovascular:  Positive for chest pain and leg swelling.   Gastrointestinal:  Positive for constipation, nausea and vomiting.   Genitourinary: Negative.    Musculoskeletal: Negative.    Skin: Negative.    Neurological: Negative.    Endo/Heme/Allergies: Negative.    Psychiatric/Behavioral: Negative.         Past Medical History    Past Medical History:   Diagnosis Date    Diabetes mellitus     GERD (gastroesophageal reflux disease)     Gout     Hypertension     Obesity        Past Surgical History    Past Surgical History:   Procedure Laterality Date    CHOLECYSTECTOMY      COLONOSCOPY N/A 7/28/2021    Procedure: COLONOSCOPY;  Surgeon: Joey Portillo MD;  Location: [facility];  Service: Endoscopy;  Laterality: N/A;    ESOPHAGOGASTRODUODENOSCOPY N/A 7/28/2021    Procedure: EGD  (ESOPHAGOGASTRODUODENOSCOPY);  Surgeon: Joey Portillo MD;  Location: [facility];  Service: Endoscopy;  Laterality: N/A;       Medications (scheduled):      albuterol-ipratropium  3 mL Nebulization Q6H WAKE    allopurinoL  100 mg Oral Daily    atorvastatin  10 mg Oral Daily    buPROPion  150 mg Oral Daily    docusate sodium  100 mg Oral Daily    [START ON 6/20/2025] enoxparin  40 mg Subcutaneous Q24H (prophylaxis, 1700)    levoFLOXacin  750 mg Intravenous Q24H    LIDOcaine  1 patch Transdermal Q24H    metoprolol succinate  50 mg Oral Daily    metroNIDAZOLE IV (PEDS and ADULTS)  500 mg Intravenous Q8H    pantoprazole  40 mg Oral Daily    predniSONE  20 mg Oral BID    vancomycin (VANCOCIN) IV (PEDS and ADULTS)  1,750 mg Intravenous Q12H       Medications (infusions):         Medications (prn):       Current Facility-Administered Medications:     acetaminophen, 650 mg, Oral, Q4H PRN    aluminum-magnesium hydroxide-simethicone, 30 mL, Oral, QID PRN    dextrose 50%, 12.5 g, Intravenous, PRN    dextrose 50%, 25 g, Intravenous, PRN    diphenhydrAMINE, 25 mg, Intravenous, Q6H PRN    glucagon (human recombinant), 1 mg, Intramuscular, PRN    glucose, 16 g, Oral, PRN    glucose, 24 g, Oral, PRN    HYDROmorphone, 0.5 mg, Intravenous, Q4H PRN    insulin aspart U-100, 0-5 Units, Subcutaneous, QID (AC + HS) PRN    magnesium oxide, 800 mg, Oral, PRN    magnesium oxide, 800 mg, Oral, PRN    methocarbamoL, 1,000 mg, Oral, TID PRN    naloxone, 0.02 mg, Intravenous, PRN    ondansetron, 4 mg, Intravenous, Q8H PRN    oxyCODONE, 5 mg, Oral, Q6H PRN    potassium bicarbonate, 35 mEq, Oral, PRN    potassium bicarbonate, 50 mEq, Oral, PRN    potassium bicarbonate, 60 mEq, Oral, PRN    prochlorperazine, 5 mg, Intravenous, Q6H PRN    simethicone, 1 tablet, Oral, QID PRN    sodium chloride 0.9%, 10 mL, Intravenous, Q12H PRN    Pharmacy to dose Vancomycin consult, , , Once **AND** vancomycin - pharmacy to dose, , Intravenous, pharmacy to manage  "frequency    Family History: No family history on file.    Social History: Tobacco: Tobacco Use History[1]                             EtOH:   Social History     Substance and Sexual Activity   Alcohol Use Not Currently                                Drugs:   Social History     Substance and Sexual Activity   Drug Use Never               Physical Exam    Vital signs:  Temp:  [97.4 °F (36.3 °C)-99.1 °F (37.3 °C)]   Pulse:  [75-93]   Resp:  [16-28]   BP: (130-164)/()   SpO2:  [93 %-97 %]     Intake/Output:   Intake/Output Summary (Last 24 hours) at 6/19/2025 1302  Last data filed at 6/19/2025 1207  Gross per 24 hour   Intake 480 ml   Output 2100 ml   Net -1620 ml        BMI: Estimated body mass index is 39.2 kg/m² as calculated from the following:    Height as of this encounter: 5' 11" (1.803 m).    Weight as of this encounter: 127.5 kg (281 lb 1.4 oz).    Physical Exam  Constitutional:       General: He is not in acute distress.     Appearance: Normal appearance. He is obese. He is diaphoretic. He is not ill-appearing.   HENT:      Head: Normocephalic and atraumatic.      Right Ear: External ear normal.      Left Ear: External ear normal.      Nose: Nose normal.      Mouth/Throat:      Mouth: Mucous membranes are moist.      Pharynx: Oropharynx is clear.      Comments: Poor dentition  Eyes:      General:         Right eye: No discharge.         Left eye: No discharge.      Conjunctiva/sclera: Conjunctivae normal.      Pupils: Pupils are equal, round, and reactive to light.   Cardiovascular:      Rate and Rhythm: Normal rate and regular rhythm.      Pulses: Normal pulses.      Heart sounds: Normal heart sounds. No murmur heard.     No friction rub.   Pulmonary:      Effort: Pulmonary effort is normal. No respiratory distress.      Breath sounds: Normal breath sounds. No stridor. No wheezing or rhonchi.   Abdominal:      General: Abdomen is flat. Bowel sounds are normal. There is no distension.      Palpations: " "Abdomen is soft.      Tenderness: There is no abdominal tenderness.   Musculoskeletal:         General: Normal range of motion.      Cervical back: Normal range of motion and neck supple. No rigidity.      Right lower leg: Edema present.      Left lower leg: Edema present.   Lymphadenopathy:      Cervical: No cervical adenopathy.   Skin:     General: Skin is warm.      Coloration: Skin is not jaundiced.      Findings: No lesion or rash.   Neurological:      General: No focal deficit present.      Mental Status: He is alert and oriented to person, place, and time.      Cranial Nerves: No cranial nerve deficit.      Motor: No weakness.   Psychiatric:         Mood and Affect: Mood normal.         Behavior: Behavior normal.         Thought Content: Thought content normal.         Judgment: Judgment normal.         Laboratory    Recent Labs   Lab 06/19/25  0554   WBC 11.83   RBC 4.85   HGB 14.5   HCT 44.1      MCV 91   MCH 29.9   MCHC 32.9       Recent Labs   Lab 06/19/25  0554   CALCIUM 9.3   *   K 4.2   CO2 22*      BUN 17   CREATININE 0.6       No results for input(s): "PT", "INR", "APTT" in the last 24 hours.    No results for input(s): "CPK", "CPKMB", "TROPONINI", "MB" in the last 24 hours.    Additional labs:     Microbiology:       Microbiology Results (last 7 days)       Procedure Component Value Units Date/Time    Culture, Respiratory with Gram Stain [[id number]] Collected: 06/18/25 0544    Order Status: Completed Specimen: Respiratory from Sputum Updated: 06/19/25 0759     Respiratory Culture Normal respiratory hilaria     GRAM STAIN (RESPIRATORY) <10 Epithelial Cells/LPF      Few WBC seen      Few Gram positive cocci      Few Gram Negative Rods    Blood culture x two cultures. Draw prior to antibiotics. [[id number]]  (Normal) Collected: 06/17/25 1949    Order Status: Completed Specimen: Blood Updated: 06/19/25 0701     CULTURE, BLOOD ([facility]) No Growth After 24 Hours    Blood culture x two " cultures. Draw prior to antibiotics. [[id number]]  (Normal) Collected: 06/17/25 1949    Order Status: Completed Specimen: Blood Updated: 06/19/25 0701     CULTURE, BLOOD ([facility]) No Growth After 24 Hours    AFB Smear Only [[id number]] Collected: 06/18/25 0544    Order Status: Sent Specimen: Respiratory Updated: 06/18/25 1004    AFB Culture & Smear [[id number]] Collected: 06/18/25 0544    Order Status: Sent Specimen: Respiratory from Sputum Updated: 06/18/25 0548    Influenza A & B by Molecular [[id number]]  (Normal) Collected: 06/17/25 1627    Order Status: Completed Specimen: Nasal Swab Updated: 06/17/25 1716     INFLUENZA A MOLECULAR Negative     INFLUENZA B MOLECULAR  Negative            Radiology    CTA Chest Non-Coronary (PE Studies)  Narrative: EXAMINATION:  CTA CHEST NON CORONARY (PE STUDIES)    CLINICAL HISTORY:  Pulmonary embolism (PE) suspected, high prob;    TECHNIQUE:  Following the intravenous administration of 75 cc of Omnipaque 350 contrast material, 1.25 mm contiguous axial images were acquired through the chest utilizing the CT pulmonary angiogram protocol.  2-D coronal and sagittal reconstructed images of the chest and sagittal oblique MIP images of the pulmonary arterial system were generated from the source data.    COMPARISON:  None.    FINDINGS:  Pulmonary arterial system: This is a good quality examination for the evaluation of pulmonary thromboembolism with good opacification of the pulmonary arteries to the level of the segmental branches of the pulmonary arterial system. There is no evidence of acute pulmonary thromboembolism. No large saddle pulmonary embolism, enlargement of the main pulmonary artery, or secondary findings of right ventricular strain.    Aorta and heart: The heart is normal in size.  No pericardial fluid.  No thoracic aortic aneurysm.  No thoracic aortic dissection.    Airways: Unremarkable.    Lymph nodes: No pathologically enlarged lymph nodes in the chest or  "axilla.    Lungs: Mild atelectatic type changes in the gravity dependent portions of the lung right greater than left.  The lungs are wise clear with no evidence of airspace consolidation, mass, or bronchiectasis.  No emphysematous lung architecture.    Pleura: No significant volume of pleural fluid or pneumothorax.  No pleural based masses.  Oval fluid loculated in the posterior major fissure on the right.    Visualized upper abdominal soft tissues: No significant abnormalities appreciated.    Bones: There is degenerative change of the thoracic spine.  Impression: 1. No evidence of acute pulmonary thromboembolism.  2. Atelectatic type changes in both lung bases right greater than left.    Electronically signed by: Vasu eVrnon  Date:    06/17/2025  Time:    18:59  X-Ray Chest PA And Lateral  Narrative: EXAMINATION:  XR CHEST PA AND LATERAL    CLINICAL HISTORY:  Chest Pain;    TECHNIQUE:  PA and lateral views of the chest were performed.    COMPARISON:  Chest x-ray of June 11, 2021 and July 26, 2024    FINDINGS:  The mediastinal and cardiac size and contours are normal.  The diaphragms of pleura are clear.  No intrapulmonary mass or infiltrate is seen.  There is irregularity of the proximal right clavicle suggesting a old fracture and possible nonunion.  Similar finding is not seen of the left clavicle.  Impression: Irregularity of the proximal right clavicle may be due to old fracture and nonunion otherwise negative chest x-ray    Electronically signed by: Vasu Nagy MD  Date:    06/17/2025  Time:    16:32        Additional Studies        Ventilator Information    Oxygen Concentration (%):  [28-32] 32             No results for input(s): "PH", "PCO2", "PO2", "HCO3", "POCSATURATED", "BE" in the last 72 hours.      Impression    Active Hospital Problems    Diagnosis  POA    *Acute bronchitis [J20.9]  Yes    Type 2 diabetes mellitus, without long-term current use of insulin [E11.9]  Yes    COPD (chronic " obstructive pulmonary disease) [J44.9]  Yes    Primary hypertension [I10]  Yes    Obesity (BMI 30-39.9) [E66.9]  Yes    JORDAN on CPAP, 100% use [G47.33]  Yes    Depression [F32.A]  Yes    Gastroesophageal reflux disease [K21.9]  Yes    Chronic gout of multiple sites [M1A.09X0]  Yes    Cardiovascular event risk, ASCVD 10-year risk 8.7%, 2021 [Z91.89]  Not Applicable      Resolved Hospital Problems   No resolved problems to display.       Plan    Acute bronchitis  Pleurisy  Possible aspiration precipitating symptoms- he was having emesis mon night    - continue antibiotics- can finish 7d course with levaquin  - add prednisone 20mg bid for pleurisy- would treat for 5d then stop  - watch glucose, insulin sliding scale  - tiny amount of fluid in the right major fissure does not look worrisome, not enough for thoracentesis  - ok for discharge home    Thank you for this consult.  I will follow with you while the patient is hospitalized.      Yaneli Anthony MD  Pulmonary & Critical Care Medicine                   [1]   Social History  Tobacco Use   Smoking Status Never   Smokeless Tobacco Never

## 2025-06-19 NOTE — CONSULTS
[facility]/Surg  Wound Care    Patient Name:  Nick Tam   MRN:  [id number]  Date: 6/19/2025  Diagnosis: Acute bronchitis    History:     Past Medical History:   Diagnosis Date    Diabetes mellitus     GERD (gastroesophageal reflux disease)     Gout     Hypertension     Obesity        Social History[1]    Precautions:     Allergies as of 06/17/2025 - Reviewed 06/17/2025   Allergen Reaction Noted    Shrimp Shortness Of Breath 07/23/2021    Codeine  04/03/2019    Pcn [penicillins]  04/03/2019       WOC Assessment Details/Treatment     Pt seen by Wound Care Nurse for consultation of wounds to bilateral feet.         06/19/25 1225   WOCN Assessment   WOCN Total Time (mins) 40   Visit Date 06/19/25   Visit Time 1225   Consult Type New   WOCN Speciality Wound   Wound moisture;yeast   Intervention assessed;changed;applied;chart review;orders   Teaching on-going        Wound 06/17/25 2200 Skin Tear Left anterior Foot   Date First Assessed/Time First Assessed: 06/17/25 2200   Present on Original Admission: Yes  Primary Wound Type: Skin Tear  Side: Left  Orientation: anterior  Location: Foot   Wound Image      Dressing Appearance Open to air   Drainage Amount None   Drainage Characteristics/Odor No odor   Appearance Red;Moist;Ecchymotic   Tissue loss description Partial thickness   Periwound Area Ecchymotic;Excoriated;Moist;Redness   Wound Edges Irregular   Care Cleansed with:;Soap and water   Dressing Silver;Hydrofiber  (Miconazole powder ordered)   Periwound Care Cleansed with pH balanced cleanser;Dry periwound area maintained;Topical treatment applied        Wound 06/17/25 2200 Other (comment) Right anterior Foot   Date First Assessed/Time First Assessed: 06/17/25 2200   Present on Original Admission: Yes  Primary Wound Type: Other (comment)  Side: Right  Orientation: anterior  Location: Foot   Wound Image     Dressing Appearance Open to air   Drainage Amount None   Drainage Characteristics/Odor No odor    Appearance Red;Moist;Ecchymotic   Tissue loss description Partial thickness   Periwound Area Ecchymotic;Excoriated;Moist;Redness   Wound Edges Irregular   Care Cleansed with:;Soap and water   Dressing Silver;Hydrofiber  (Miconazole powder ordered.)   Periwound Care Cleansed with pH balanced cleanser;Dry periwound area maintained;Topical treatment applied       Recommendations made to primary team for Bilateral Feet/Toes--MASD/Yeast. Bedside Nursing to clean feet and between toes with Hibiclens soap and water.  Dry thoroughly.  Apply Miconazole powder to feet and between toes BID and PRN.       Orders placed.     06/19/2025         [1]   Social History  Socioeconomic History    Marital status: Significant Other   Occupational History    Occupation: Disabled   Tobacco Use    Smoking status: Never    Smokeless tobacco: Never   Substance and Sexual Activity    Alcohol use: Not Currently    Drug use: Never    Sexual activity: Not Currently     Social Drivers of Health     Financial Resource Strain: Low Risk  (6/18/2025)    Overall Financial Resource Strain (CARDIA)     Difficulty of Paying Living Expenses: Not very hard   Food Insecurity: No Food Insecurity (6/18/2025)    Hunger Vital Sign     Worried About Running Out of Food in the Last Year: Never true     Ran Out of Food in the Last Year: Never true   Transportation Needs: No Transportation Needs (6/18/2025)    PRAPARE - Transportation     Lack of Transportation (Medical): No     Lack of Transportation (Non-Medical): No   Stress: No Stress Concern Present (6/18/2025)    [employer] - Occupational Stress Questionnaire     Feeling of Stress : Not at all   Housing Stability: Low Risk  (6/18/2025)    Housing Stability Vital Sign     Unable to Pay for Housing in the Last Year: No     Homeless in the Last Year: No

## 2025-06-19 NOTE — ASSESSMENT & PLAN NOTE
Patient's FSGs are uncontrolled due to hyperglycemia on current medication regimen.  Last A1c reviewed-   Lab Results   Component Value Date    HGBA1C 6.8 (H) 04/28/2025     Most recent fingerstick glucose reviewed-   Recent Labs   Lab 06/18/25  1507 06/18/25 2017 06/19/25  0740 06/19/25  1112   POCTGLUCOSE 203* 215* 182* 199*     Current correctional scale  Low  Increase anti-hyperglycemic dose as follows-   Antihyperglycemics (From admission, onward)      Start     Stop Route Frequency Ordered    06/17/25 2146  insulin aspart U-100 pen 0-5 Units         -- SubQ Before meals & nightly PRN 06/17/25 2049          Hold Oral hypoglycemics while patient is in the hospital.

## 2025-06-19 NOTE — DISCHARGE INSTRUCTIONS
Bilateral Feet/Toes--MASD/Yeast. Bedside Nursing to clean feet and between toes with Hibiclens soap and water.  Dry thoroughly.  Apply Miconazole powder to feet and between toes BID and PRN.

## 2025-06-19 NOTE — ASSESSMENT & PLAN NOTE
Patient's blood pressure range in the last 24 hours was: BP  Min: 130/69  Max: 161/100.The patient's inpatient anti-hypertensive regimen is listed below:  Current Antihypertensives  metoprolol succinate (TOPROL-XL) 24 hr tablet 50 mg, Daily, OralNot verified    Plan  -Continue home metoprolol  -Continue to monitor

## 2025-06-19 NOTE — NURSING
IV and tele removed. Orders reviewed. Prescriptions sent to pharmacy. Pt drove self here. Wife at bedside. Pt escorted to car via w/c and d/c home.

## 2025-06-19 NOTE — PLAN OF CARE
Pt clear for DC from case management standpoint. Discharging to home.  Attempted to schedule hospital follow up with PCP.  No available appointments within smart scheduling recs.  Inbasket message sent to office to contact pt to schedule.       06/19/25 1517   Final Note   Assessment Type Final Discharge Note   Anticipated Discharge Disposition Home   Hospital Resources/Appts/Education Provided Appointment suggestion unavailable

## 2025-06-20 LAB
BACTERIA SPEC CULT: NORMAL
GRAM STAIN (RESPIRATORY) (SMH): NORMAL

## 2025-06-22 LAB — MAYO GENERIC ORDERABLE RESULT: ABNORMAL

## 2025-06-23 LAB
BACTERIA BLD CULT: NORMAL
BACTERIA BLD CULT: NORMAL

## 2025-06-24 LAB
ACID FAST STN SPEC: NEGATIVE
SPECIMEN PREPARATION: NORMAL

## 2025-06-25 LAB — MYCOBACTERIUM SPEC QL CULT: NORMAL

## 2025-06-26 ENCOUNTER — OFFICE VISIT (OUTPATIENT)
Dept: FAMILY MEDICINE | Facility: CLINIC | Age: 55
End: 2025-06-26
Payer: MEDICAID

## 2025-06-26 VITALS
HEART RATE: 74 BPM | OXYGEN SATURATION: 97 % | BODY MASS INDEX: 38.24 KG/M2 | WEIGHT: 273.13 LBS | HEIGHT: 71 IN | SYSTOLIC BLOOD PRESSURE: 124 MMHG | DIASTOLIC BLOOD PRESSURE: 72 MMHG

## 2025-06-26 DIAGNOSIS — E11.42 TYPE 2 DIABETES MELLITUS WITH DIABETIC POLYNEUROPATHY, WITHOUT LONG-TERM CURRENT USE OF INSULIN: ICD-10-CM

## 2025-06-26 DIAGNOSIS — J22 LOWER RESPIRATORY INFECTION: ICD-10-CM

## 2025-06-26 DIAGNOSIS — K21.9 GASTROESOPHAGEAL REFLUX DISEASE, UNSPECIFIED WHETHER ESOPHAGITIS PRESENT: ICD-10-CM

## 2025-06-26 DIAGNOSIS — E66.9 OBESITY (BMI 30-39.9): ICD-10-CM

## 2025-06-26 DIAGNOSIS — Z09 HOSPITAL DISCHARGE FOLLOW-UP: Primary | ICD-10-CM

## 2025-06-26 DIAGNOSIS — I10 ESSENTIAL HYPERTENSION: ICD-10-CM

## 2025-06-26 PROCEDURE — 99999 PR PBB SHADOW E&M-EST. PATIENT-LVL IV: CPT | Mod: PBBFAC,,, | Performed by: NURSE PRACTITIONER

## 2025-06-26 PROCEDURE — 99214 OFFICE O/P EST MOD 30 MIN: CPT | Mod: PBBFAC,PN | Performed by: NURSE PRACTITIONER

## 2025-06-26 NOTE — PROGRESS NOTES
To Subjective:       Patient ID: Nick Tam is a 55 y.o. male.    Chief Complaint: Hospital Follow Up (Pt is here for a hospital follow up)    Nick Tam presents to the clinic today for hospital follow up   Established patient within the clinic, new patient to myself    Under the care of the following:  PCP: Dr. Henry  Gastroenterology: Dr. Yo    Patient Active Problem List  Diagnosis  · Belching, chronic  · Symptoms of gastroesophageal reflux  · Essential hypertension, onset before 1987  · Literacy level of illiterate  · Sedentary lifestyle, onset 2018  · Chronic gout of multiple sites  · Hypoalbuminemia  · Cardiovascular event risk, ASCVD 10-year risk 8.7%, 2021  · Family history of premature CAD  · Excessive daytime sleepiness  · Family history of sleep apnea  · Chronic fatigue  · Gastroesophageal reflux disease  · Depression  · Chronic cough  · Elevated ALT measurement  · Fatty liver  · Hiatal hernia  · Hemorrhoids  · Abdominal pain  · JORDAN on CPAP, 100% use  · Obese abdomen  · Acute bronchitis  · Type 2 diabetes mellitus, without long-term current use of insulin  · COPD (chronic obstructive pulmonary disease)  · Primary hypertension  · Obesity (BMI 30-39.9)        Admission Date: 6/17/2025  Hospital Length of Stay: 0 days  Discharge Date and Time: No discharge date for patient encounter.  Attending Physician: Dean Wheeler MD   Discharging Provider: Dean Wheeler MD  Primary Care Provider: Floridalma Henry MD     Primary Care Team: Networked reference to record PCT      HPI:   Nick Tam is a 55 year old male with a previous medical history of DMII, GERD, Gout, HTN, obesity, JORDAN on cpap, and COPD who presented to the ED for back pain and chest pain. The patient reports that three weeks ago he began having lower back pain and was using OTC tylenol/ibuprofen with no pain relief. Was seen at [place] and prescribed muscle relaxers and tramadol with no relief. He reports the pain has  "increased and is main in his right rib area and is unable to take a deep breath due to pain. The pain radiates from his back into his chest. Decreased appetite, chills, with diarrhea Sunday and N/V last night. Chest Xray negative for acute process. CTA negative for PE but in body of report read "Pleura: No significant volume of pleural fluid or pneumothorax.  No pleural based masses.  Oval fluid loculated in the posterior major fissure on the right". CBC showed a WBC of 13.63. Pro calcitonin negative, D-Dimer 2.05, Flu/Covid negative, CMP largely unremarkable, urine studies negative for infection and only showed 4 RBC. Lactic acid 1.5 and Troponin negative x3. EKG similar to previous with NSR and left anterior fascicular block and fusion complexes. Patient initiated on IV levofloxacin with flagyl added on upon admission. Noted to have oxygen saturation of 89-90% on room air that improved with oxygen. Patient admitted by hospital medicine for further evaluation and management.        Hospital Course:   iNck Tam is a 55 year old male with a past medical history of HTN, JORDAN, DM, obesity, gout, and MDD/LAUREL who presented with right-sided back and chest pain for which he is being treated for an acute bronchitis. CTA chest showed a small loculated fluid collection to this area at the major fissure. He was on empiric vancomycin, Levaquin and Flagyl. He was on Duonebs, prednisone and 2-3 L NC O2. Blood and respiratory cultures are negative. Pulmonary has been consulted. He was deemed stable for discharge 6/19 per Pulmonary and will complete a course of Levaquin and prednisone; a home O2 evaluation was ordered upon discharge.         START taking these medications      levoFLOXacin 750 MG tablet  Commonly known as: LEVAQUIN  Take 1 tablet (750 mg total) by mouth once daily. for 5 days  Start taking on: June 20, 2025     predniSONE 20 MG tablet  Commonly known as: DELTASONE  Take 1 tablet (20 mg total) by mouth 2 (two) " "times daily. for 7 doses     Completed Levaquin   Completes prednisone today  Reports overall feels fine with no new concerns or complaints since discharge.           Review of Systems    Problem List[1]    Objective:      Physical Exam  Vitals and nursing note reviewed.   Constitutional:       General: He is not in acute distress.     Appearance: Normal appearance. He is obese. He is not ill-appearing.   Eyes:      Conjunctiva/sclera: Conjunctivae normal.   Cardiovascular:      Rate and Rhythm: Normal rate and regular rhythm.      Heart sounds: Normal heart sounds. No murmur heard.  Pulmonary:      Effort: Pulmonary effort is normal. No respiratory distress.      Breath sounds: Normal breath sounds. No wheezing.   Abdominal:      General: Bowel sounds are normal.   Skin:     General: Skin is warm and dry.      Findings: No rash.   Neurological:      Mental Status: He is alert and oriented to person, place, and time.   Psychiatric:         Mood and Affect: Mood normal.         Behavior: Behavior normal.         Lab Results   Component Value Date    WBC 11.83 06/19/2025    HGB 14.5 06/19/2025    HCT 44.1 06/19/2025     06/19/2025    CHOL 175 10/11/2024    TRIG 133 10/11/2024    HDL 47 10/11/2024    ALT 20 06/18/2025    AST 23 06/18/2025     (L) 06/19/2025    K 4.2 06/19/2025     06/19/2025    CREATININE 0.6 06/19/2025    BUN 17 06/19/2025    CO2 22 (L) 06/19/2025    PSA 0.14 10/11/2024    HGBA1C 6.8 (H) 04/28/2025     The 10-year ASCVD risk score (Brien PERLA, et al., 2019) is: 12.6%    Values used to calculate the score:      Age: 55 years      Sex: Male      Is Non- : No      Diabetic: Yes      Tobacco smoker: No      Systolic Blood Pressure: 124 mmHg      Is BP treated: Yes      HDL Cholesterol: 47 mg/dL      Total Cholesterol: 175 mg/dL  Visit Vitals  /72 (BP Location: Left arm, Patient Position: Sitting)   Pulse 74   Ht 5' 11" (1.803 m)   Wt 123.9 kg (273 lb 1.6 oz) "   SpO2 97%   BMI 38.09 kg/m²      Assessment:       1. Hospital discharge follow-up    2. Lower respiratory infection    3. Essential hypertension, onset before 1987    4. Type 2 diabetes mellitus with diabetic polyneuropathy, without long-term current use of insulin    5. Obesity (BMI 30-39.9)    6. Gastroesophageal reflux disease, unspecified whether esophagitis present        Plan:       1. Hospital discharge follow-up    2. Lower respiratory infection  Continue breathing exercises (deep breathing/coughing)   3. Essential hypertension, onset before 1987  Stable continue current regimen   4. Type 2 diabetes mellitus with diabetic polyneuropathy, without long-term current use of insulin  Continue current tx regimen  Obtain UTD diabetic eye exam  Repeat A1c prior to next office visit   5. Obesity (BMI 30-39.9)    6. Gastroesophageal reflux disease, unspecified whether esophagitis present  GERD diet  Weight management   Continue current medication regimen      No follow-ups on file.      Future Appointments       Date Provider Specialty Appt Notes    8/14/2025 Floridalma Henry MD Family Medicine 3 mon f/u                  [1]   Patient Active Problem List  Diagnosis    Belching, chronic    Symptoms of gastroesophageal reflux    Essential hypertension, onset before 1987    Literacy level of illiterate    Sedentary lifestyle, onset 2018    Chronic gout of multiple sites    Hypoalbuminemia    Cardiovascular event risk, ASCVD 10-year risk 8.7%, 2021    Family history of premature CAD    Excessive daytime sleepiness    Family history of sleep apnea    Chronic fatigue    Gastroesophageal reflux disease    Depression    Chronic cough    Elevated ALT measurement    Fatty liver    Hiatal hernia    Hemorrhoids    Abdominal pain    JORDAN on CPAP, 100% use    Obese abdomen    Acute bronchitis    Type 2 diabetes mellitus, without long-term current use of insulin    COPD (chronic obstructive pulmonary disease)    Primary  hypertension    Obesity (BMI 30-39.9)

## 2025-07-23 DIAGNOSIS — R14.2 BELCHING: ICD-10-CM

## 2025-07-23 DIAGNOSIS — E11.42 TYPE 2 DIABETES MELLITUS WITH DIABETIC POLYNEUROPATHY, WITHOUT LONG-TERM CURRENT USE OF INSULIN: ICD-10-CM

## 2025-07-23 DIAGNOSIS — K21.9 GASTROESOPHAGEAL REFLUX DISEASE, UNSPECIFIED WHETHER ESOPHAGITIS PRESENT: ICD-10-CM

## 2025-07-23 NOTE — TELEPHONE ENCOUNTER
Care Due:                  Date            Visit Type   Department     Provider  --------------------------------------------------------------------------------                                EP -                              [facility] FAMILY  Last Visit: 04-      CARE ([facility])   MEDICINE       Floridalma Henry                              [facility]  Next Visit: 08-      CARE ([facility])   MEDICINE       Floridalma Henry                                                            Last  Test          Frequency    Reason                     Performed    Due Date  --------------------------------------------------------------------------------    Lipid Panel.  12 months..  atorvastatin.............  10-   10-    Uric Acid...  12 months..  allopurinoL..............  Not Found    Overdue    [employer] Embedded Care Due Messages. Reference number: [id number].   7/23/2025 1:58:00 PM CDT

## 2025-07-23 NOTE — TELEPHONE ENCOUNTER
Refill Routing Note   Medication(s) are not appropriate for processing by [facility] for the following reason(s):        Outside of protocol    [facility] action(s):  Route     Requires labs : Yes             Appointments  past 12m or future 3m with PCP    Date Provider   Last Visit   4/28/2025 Floridalma Henry MD   Next Visit   8/14/2025 Floridalma Henry MD   ED visits in past 90 days: 1        Note composed:4:31 PM 07/23/2025

## 2025-07-24 RX ORDER — SUCRALFATE 1 G/1
TABLET ORAL
Qty: 90 TABLET | Refills: 11 | Status: SHIPPED | OUTPATIENT
Start: 2025-07-24

## 2025-07-24 RX ORDER — GABAPENTIN 600 MG/1
TABLET ORAL
Qty: 30 TABLET | Refills: 3 | Status: SHIPPED | OUTPATIENT
Start: 2025-07-24

## 2025-08-01 DIAGNOSIS — K21.9 GASTROESOPHAGEAL REFLUX DISEASE, UNSPECIFIED WHETHER ESOPHAGITIS PRESENT: ICD-10-CM

## 2025-08-01 DIAGNOSIS — R14.2 BELCHING: ICD-10-CM

## 2025-08-01 NOTE — TELEPHONE ENCOUNTER
Care Due:                  Date            Visit Type   Department     Provider  --------------------------------------------------------------------------------                                EP -                              [facility] FAMILY  Last Visit: 04-      CARE ([facility])   MEDICINE       Floridalma Henry                              [facility]  Next Visit: 08-      CARE ([facility])   MEDICINE       Floridalma Henry                                                            Last  Test          Frequency    Reason                     Performed    Due Date  --------------------------------------------------------------------------------    HBA1C.......  6 months...  dapagliflozin,             04-   10-                             liraglutide..............    [facility] Embedded Care Due Messages. Reference number: [id number].   8/01/2025 9:42:35 AM CDT

## 2025-08-03 RX ORDER — PANTOPRAZOLE SODIUM 40 MG/1
40 TABLET, DELAYED RELEASE ORAL 2 TIMES DAILY
Qty: 60 TABLET | Refills: 11 | Status: SHIPPED | OUTPATIENT
Start: 2025-08-03

## 2025-08-03 NOTE — TELEPHONE ENCOUNTER
Mallampati: Class II - soft palate, uvula, fauces visible. ASA: Class 3 - patient with severe systemic disease. Refill Routing Note   Medication(s) are not appropriate for processing by [facility] for the following reason(s):        Outside of protocol: PPI total daily dose oop    [facility] action(s):  Route   Requires labs : Yes             Appointments  past 12m or future 3m with PCP    Date Provider   Last Visit   4/28/2025 Floridalma Henry MD   Next Visit   8/14/2025 Floridalma Henry MD   ED visits in past 90 days: 1        Note composed:10:05 PM 08/02/2025

## 2025-08-14 ENCOUNTER — OFFICE VISIT (OUTPATIENT)
Dept: FAMILY MEDICINE | Facility: CLINIC | Age: 55
End: 2025-08-14
Payer: MEDICAID

## 2025-08-14 VITALS
WEIGHT: 274.19 LBS | RESPIRATION RATE: 18 BRPM | HEART RATE: 84 BPM | OXYGEN SATURATION: 97 % | SYSTOLIC BLOOD PRESSURE: 124 MMHG | HEIGHT: 71 IN | DIASTOLIC BLOOD PRESSURE: 72 MMHG | BODY MASS INDEX: 38.39 KG/M2

## 2025-08-14 DIAGNOSIS — M1A.09X0 IDIOPATHIC CHRONIC GOUT OF MULTIPLE SITES WITHOUT TOPHUS: ICD-10-CM

## 2025-08-14 DIAGNOSIS — R11.0 NAUSEA: ICD-10-CM

## 2025-08-14 DIAGNOSIS — Z82.49 FAMILY HISTORY OF PREMATURE CAD: ICD-10-CM

## 2025-08-14 DIAGNOSIS — E11.9 TYPE 2 DIABETES MELLITUS WITHOUT COMPLICATION, WITHOUT LONG-TERM CURRENT USE OF INSULIN: ICD-10-CM

## 2025-08-14 DIAGNOSIS — E11.42 TYPE 2 DIABETES MELLITUS WITH DIABETIC POLYNEUROPATHY, WITHOUT LONG-TERM CURRENT USE OF INSULIN: ICD-10-CM

## 2025-08-14 DIAGNOSIS — I10 ESSENTIAL HYPERTENSION: ICD-10-CM

## 2025-08-14 DIAGNOSIS — J44.9 CHRONIC OBSTRUCTIVE PULMONARY DISEASE, UNSPECIFIED COPD TYPE: Primary | ICD-10-CM

## 2025-08-14 DIAGNOSIS — Z91.89 CARDIOVASCULAR EVENT RISK: ICD-10-CM

## 2025-08-14 DIAGNOSIS — R05.3 CHRONIC COUGH: ICD-10-CM

## 2025-08-14 PROCEDURE — 99213 OFFICE O/P EST LOW 20 MIN: CPT | Mod: PBBFAC,PN | Performed by: FAMILY MEDICINE

## 2025-08-14 PROCEDURE — 99214 OFFICE O/P EST MOD 30 MIN: CPT | Mod: S$PBB,,, | Performed by: FAMILY MEDICINE

## 2025-08-14 PROCEDURE — 3061F NEG MICROALBUMINURIA REV: CPT | Mod: CPTII,,, | Performed by: FAMILY MEDICINE

## 2025-08-14 PROCEDURE — 1159F MED LIST DOCD IN RCRD: CPT | Mod: CPTII,,, | Performed by: FAMILY MEDICINE

## 2025-08-14 PROCEDURE — 3008F BODY MASS INDEX DOCD: CPT | Mod: CPTII,,, | Performed by: FAMILY MEDICINE

## 2025-08-14 PROCEDURE — 99999 PR PBB SHADOW E&M-EST. PATIENT-LVL III: CPT | Mod: PBBFAC,,, | Performed by: FAMILY MEDICINE

## 2025-08-14 PROCEDURE — 3078F DIAST BP <80 MM HG: CPT | Mod: CPTII,,, | Performed by: FAMILY MEDICINE

## 2025-08-14 PROCEDURE — 3066F NEPHROPATHY DOC TX: CPT | Mod: CPTII,,, | Performed by: FAMILY MEDICINE

## 2025-08-14 PROCEDURE — 1160F RVW MEDS BY RX/DR IN RCRD: CPT | Mod: CPTII,,, | Performed by: FAMILY MEDICINE

## 2025-08-14 PROCEDURE — 3074F SYST BP LT 130 MM HG: CPT | Mod: CPTII,,, | Performed by: FAMILY MEDICINE

## 2025-08-14 PROCEDURE — 3044F HG A1C LEVEL LT 7.0%: CPT | Mod: CPTII,,, | Performed by: FAMILY MEDICINE

## 2025-08-14 RX ORDER — DAPAGLIFLOZIN 5 MG/1
5 TABLET, FILM COATED ORAL DAILY
Qty: 30 TABLET | Refills: 11 | Status: SHIPPED | OUTPATIENT
Start: 2025-08-14

## 2025-08-14 RX ORDER — METOPROLOL SUCCINATE 50 MG/1
50 TABLET, EXTENDED RELEASE ORAL DAILY
Qty: 90 TABLET | Refills: 3 | Status: SHIPPED | OUTPATIENT
Start: 2025-08-14

## 2025-08-14 RX ORDER — ONDANSETRON 4 MG/1
4 TABLET, ORALLY DISINTEGRATING ORAL EVERY 8 HOURS PRN
Qty: 30 TABLET | Refills: 1 | Status: SHIPPED | OUTPATIENT
Start: 2025-08-14

## 2025-08-14 RX ORDER — ALLOPURINOL 100 MG/1
100 TABLET ORAL DAILY
Qty: 90 TABLET | Refills: 3 | Status: SHIPPED | OUTPATIENT
Start: 2025-08-14

## 2025-08-14 RX ORDER — CLONIDINE HYDROCHLORIDE 0.2 MG/1
0.2 TABLET ORAL DAILY
Qty: 90 TABLET | Refills: 3 | Status: SHIPPED | OUTPATIENT
Start: 2025-08-14

## 2025-08-14 RX ORDER — ATORVASTATIN CALCIUM 10 MG/1
10 TABLET, FILM COATED ORAL DAILY
Qty: 90 TABLET | Refills: 3 | Status: SHIPPED | OUTPATIENT
Start: 2025-08-14 | End: 2026-08-09

## 2025-08-14 RX ORDER — LIRAGLUTIDE 6 MG/ML
1.8 INJECTION SUBCUTANEOUS DAILY
Qty: 27 ML | Refills: 3 | Status: SHIPPED | OUTPATIENT
Start: 2025-08-14 | End: 2026-08-14

## 2025-08-14 RX ORDER — DICYCLOMINE HYDROCHLORIDE 10 MG/1
CAPSULE ORAL
Qty: 120 CAPSULE | Refills: 1 | Status: SHIPPED | OUTPATIENT
Start: 2025-08-14

## 2025-08-14 RX ORDER — ALBUTEROL SULFATE 90 UG/1
2 INHALANT RESPIRATORY (INHALATION) EVERY 6 HOURS PRN
Qty: 18 G | Refills: 0 | Status: SHIPPED | OUTPATIENT
Start: 2025-08-14 | End: 2026-08-14

## 2025-08-25 ENCOUNTER — PROCEDURE VISIT (OUTPATIENT)
Dept: RESPIRATORY THERAPY | Facility: HOSPITAL | Age: 55
End: 2025-08-25
Attending: FAMILY MEDICINE
Payer: MEDICAID

## 2025-08-25 DIAGNOSIS — R05.3 CHRONIC COUGH: ICD-10-CM

## 2025-08-25 DIAGNOSIS — J44.9 CHRONIC OBSTRUCTIVE PULMONARY DISEASE, UNSPECIFIED COPD TYPE: ICD-10-CM

## 2025-08-25 PROCEDURE — 25000242 PHARM REV CODE 250 ALT 637 W/ HCPCS: Performed by: FAMILY MEDICINE

## 2025-08-25 PROCEDURE — 94727 GAS DIL/WSHOT DETER LNG VOL: CPT

## 2025-08-25 PROCEDURE — 94729 DIFFUSING CAPACITY: CPT

## 2025-08-25 PROCEDURE — 94060 EVALUATION OF WHEEZING: CPT

## 2025-08-25 RX ORDER — ALBUTEROL SULFATE 0.83 MG/ML
2.5 SOLUTION RESPIRATORY (INHALATION) ONCE
Status: COMPLETED | OUTPATIENT
Start: 2025-08-25 | End: 2025-08-25

## 2025-08-25 RX ADMIN — ALBUTEROL SULFATE 2.5 MG: 2.5 SOLUTION RESPIRATORY (INHALATION) at 03:08

## 2025-08-26 LAB
BRPFT: NORMAL
DLCO SINGLE BREATH LLN: 28.86
DLCO SINGLE BREATH PRE REF: 63 %
DLCO SINGLE BREATH REF: 35.78
DLCOC SBVA LLN: 3.19
DLCOC SBVA REF: 4.17
DLCOC SINGLE BREATH LLN: 28.86
DLCOC SINGLE BREATH REF: 35.78
DLCOVA LLN: 3.19
DLCOVA PRE REF: 125.5 %
DLCOVA PRE: 5.24 ML/(MIN*MMHG*L)
DLCOVA REF: 4.17
FEF 25 75 CHG: -18.6 %
FEF 25 75 LLN: 1.87
FEF 25 75 POST REF: 62 %
FEF 25 75 PRE REF: 76.2 %
FEF 25 75 REF: 3.7
FET100 CHG: 65.7 %
FEV1 CHG: -4.2 %
FEV1 FVC CHG: -2.1 %
FEV1 FVC LLN: 65
FEV1 FVC POST REF: 102.5 %
FEV1 FVC PRE REF: 104.8 %
FEV1 FVC REF: 77
FEV1 LLN: 3.48
FEV1 POST REF: 55.9 %
FEV1 PRE REF: 58.3 %
FEV1 REF: 4.57
FVC CHG: -2.1 %
FVC LLN: 4.6
FVC POST REF: 54.2 %
FVC PRE REF: 55.4 %
FVC REF: 5.99
IVC PRE: 2.6 L
IVC SINGLE BREATH LLN: 4.6
IVC SINGLE BREATH PRE REF: 43.5 %
IVC SINGLE BREATH REF: 5.99
MVV LLN: 146
MVV PRE REF: 58.9 %
MVV REF: 171
PEF CHG: 8 %
PEF LLN: 8.32
PEF POST REF: 76 %
PEF PRE REF: 70.3 %
PEF REF: 11.13
POST FEF 25 75: 2.29 L/S
POST FET 100: 9.14 SEC
POST FEV1 FVC: 78.63 %
POST FEV1: 2.55 L
POST FVC: 3.25 L
POST PEF: 8.46 L/S
PRE DLCO: 22.56 ML/(MIN*MMHG)
PRE FEF 25 75: 2.82 L/S
PRE FET 100: 5.51 SEC
PRE FEV1 FVC: 80.34 %
PRE FEV1: 2.67 L
PRE FVC: 3.32 L
PRE MVV: 101 L/MIN
PRE PEF: 7.83 L/S
VA PRE: 4.31 L
VA SINGLE BREATH LLN: 8.43
VA SINGLE BREATH PRE REF: 51.1 %
VA SINGLE BREATH REF: 8.43

## 2025-08-26 PROCEDURE — 94060 EVALUATION OF WHEEZING: CPT | Mod: 26,,, | Performed by: INTERNAL MEDICINE

## 2025-08-26 PROCEDURE — 94729 DIFFUSING CAPACITY: CPT | Mod: 26,,, | Performed by: INTERNAL MEDICINE

## (undated) DEVICE — KIT ENDO CARRY-ON PROC 100310

## (undated) DEVICE — CANISTER SUCTION 3000CC

## (undated) DEVICE — KIT DEFENDO VLV  AIR WATER SUC

## (undated) DEVICE — SOL WATER STRL IRR 1000ML

## (undated) DEVICE — BITE BLOCK ADULT LATEX FREE

## (undated) DEVICE — FORCEP BIOSY RJ 4 HOT 2.2X240

## (undated) DEVICE — SEE MEDLINE ITEM 146416